# Patient Record
Sex: FEMALE | Race: WHITE | NOT HISPANIC OR LATINO | Employment: OTHER | URBAN - METROPOLITAN AREA
[De-identification: names, ages, dates, MRNs, and addresses within clinical notes are randomized per-mention and may not be internally consistent; named-entity substitution may affect disease eponyms.]

---

## 2017-02-24 ENCOUNTER — ALLSCRIPTS OFFICE VISIT (OUTPATIENT)
Dept: OTHER | Facility: OTHER | Age: 77
End: 2017-02-24

## 2017-03-01 ENCOUNTER — TRANSCRIBE ORDERS (OUTPATIENT)
Dept: ADMINISTRATIVE | Facility: HOSPITAL | Age: 77
End: 2017-03-01

## 2017-03-01 DIAGNOSIS — N28.1 KIDNEY CYST, ACQUIRED: ICD-10-CM

## 2017-03-01 DIAGNOSIS — K76.89 LIVER CYST: Primary | ICD-10-CM

## 2017-03-09 ENCOUNTER — HOSPITAL ENCOUNTER (OUTPATIENT)
Dept: RADIOLOGY | Facility: HOSPITAL | Age: 77
Discharge: HOME | End: 2017-03-09
Attending: INTERNAL MEDICINE
Payer: MEDICARE

## 2017-03-09 DIAGNOSIS — K76.89 LIVER CYST: ICD-10-CM

## 2017-03-09 DIAGNOSIS — N28.1 KIDNEY CYST, ACQUIRED: ICD-10-CM

## 2017-03-09 PROCEDURE — 76700 US EXAM ABDOM COMPLETE: CPT

## 2017-08-08 ENCOUNTER — ANESTHESIA EVENT (OUTPATIENT)
Dept: GASTROENTEROLOGY | Facility: AMBULARY SURGERY CENTER | Age: 77
End: 2017-08-08
Payer: MEDICARE

## 2017-08-09 ENCOUNTER — ANESTHESIA (OUTPATIENT)
Dept: GASTROENTEROLOGY | Facility: AMBULARY SURGERY CENTER | Age: 77
End: 2017-08-09
Payer: MEDICARE

## 2017-08-09 ENCOUNTER — HOSPITAL ENCOUNTER (OUTPATIENT)
Facility: AMBULARY SURGERY CENTER | Age: 77
Setting detail: OUTPATIENT SURGERY
Discharge: HOME | End: 2017-08-09
Attending: INTERNAL MEDICINE | Admitting: INTERNAL MEDICINE
Payer: MEDICARE

## 2017-08-09 VITALS
WEIGHT: 116 LBS | HEART RATE: 56 BPM | HEIGHT: 61 IN | TEMPERATURE: 97.2 F | OXYGEN SATURATION: 100 % | RESPIRATION RATE: 16 BRPM | BODY MASS INDEX: 21.9 KG/M2 | SYSTOLIC BLOOD PRESSURE: 144 MMHG | DIASTOLIC BLOOD PRESSURE: 67 MMHG

## 2017-08-09 DIAGNOSIS — K29.30 CHRONIC SUPERFICIAL GASTRITIS WITHOUT BLEEDING: ICD-10-CM

## 2017-08-09 DIAGNOSIS — K21.00 GASTRO-ESOPHAGEAL REFLUX DISEASE WITH ESOPHAGITIS: ICD-10-CM

## 2017-08-09 PROCEDURE — 88305 TISSUE EXAM BY PATHOLOGIST: CPT | Performed by: INTERNAL MEDICINE

## 2017-08-09 PROCEDURE — 88342 IMHCHEM/IMCYTCHM 1ST ANTB: CPT | Performed by: INTERNAL MEDICINE

## 2017-08-09 PROCEDURE — 88313 SPECIAL STAINS GROUP 2: CPT | Performed by: INTERNAL MEDICINE

## 2017-08-09 RX ORDER — PROPOFOL 10 MG/ML
INJECTION, EMULSION INTRAVENOUS CONTINUOUS PRN
Status: DISCONTINUED | OUTPATIENT
Start: 2017-08-09 | End: 2017-08-09 | Stop reason: SURG

## 2017-08-09 RX ORDER — SODIUM CHLORIDE, SODIUM LACTATE, POTASSIUM CHLORIDE, CALCIUM CHLORIDE 600; 310; 30; 20 MG/100ML; MG/100ML; MG/100ML; MG/100ML
125 INJECTION, SOLUTION INTRAVENOUS CONTINUOUS
Status: DISCONTINUED | OUTPATIENT
Start: 2017-08-09 | End: 2017-08-09 | Stop reason: HOSPADM

## 2017-08-09 RX ORDER — PROPOFOL 10 MG/ML
INJECTION, EMULSION INTRAVENOUS AS NEEDED
Status: DISCONTINUED | OUTPATIENT
Start: 2017-08-09 | End: 2017-08-09 | Stop reason: SURG

## 2017-08-09 RX ADMIN — SODIUM CHLORIDE, SODIUM LACTATE, POTASSIUM CHLORIDE, AND CALCIUM CHLORIDE 125 ML/HR: .6; .31; .03; .02 INJECTION, SOLUTION INTRAVENOUS at 08:04

## 2017-08-09 RX ADMIN — PROPOFOL 60 MG: 10 INJECTION, EMULSION INTRAVENOUS at 08:42

## 2017-08-09 RX ADMIN — SODIUM CHLORIDE, SODIUM LACTATE, POTASSIUM CHLORIDE, AND CALCIUM CHLORIDE: .6; .31; .03; .02 INJECTION, SOLUTION INTRAVENOUS at 08:34

## 2017-08-09 RX ADMIN — PROPOFOL 120 MCG/KG/MIN: 10 INJECTION, EMULSION INTRAVENOUS at 08:42

## 2017-08-09 RX ADMIN — LIDOCAINE HYDROCHLORIDE 80 MG: 20 INJECTION, SOLUTION INTRAVENOUS at 08:42

## 2017-08-30 ENCOUNTER — ALLSCRIPTS OFFICE VISIT (OUTPATIENT)
Dept: OTHER | Facility: OTHER | Age: 77
End: 2017-08-30

## 2017-11-13 ENCOUNTER — ALLSCRIPTS OFFICE VISIT (OUTPATIENT)
Dept: OTHER | Facility: OTHER | Age: 77
End: 2017-11-13

## 2017-11-14 NOTE — CONSULTS
Assessment  1  Difficulty walking (719 7) (R26 2)   2  Leg pain (729 5) (M79 606)   3  Callus (700) (L84)   4  Bony exostosis (726 91) (M89 8X9)   5  Hammer toe of left foot (735 4) (M20 42)    Plan     · Follow-up PRN Evaluation and Treatment  Follow-up  Status: Complete  Done:13Nov2017 09:27AM   · Schedule Surgery Treatment  Procedure  Status: Hold For - Scheduling  Requested for:13Nov2017   · Cut your nails straight across ; Status:Complete;   Done: 60YZM8863 09:27AM   · Help with corns and calluses ; Status:Complete;   Done: 91XOB6954 09:27AM   · There are many things you can do at home to ensure good foot health ; Status:Complete;  Done: 93XWY2415 09:27AM   · Wear shoes that give your toes plenty of room ; Status:Complete;   Done: 90YFW883613:10YS    Discussion/Summary  The patient was counseled regarding instructions for management,-- patient and family education,-- importance of compliance with treatment  Patient is able to Self-Care  The treatment plan was reviewed with the patient/guardian  The patient/guardian understands and agrees with the treatment plan      Chief Complaint  Left foot pain corn      History of Present Illness  HPI: Patient has continued pain in her small toes of the left foot  Patient has painful corns  She presents for evaluation      Review of Systems   Constitutional: No fever, no chills, feels well, no tiredness, no recent weight gain or loss  ENT: no ear ache, no loss of hearing, no nosebleeds or nasal discharge, no sore throat or hoarseness  Cardiovascular: no complaints of slow or fast heart rate, no chest pain, no palpitations, no leg claudication or lower extremity edema  Gastrointestinal: no complaints of abdominal pain, no constipation, no nausea or diarrhea, no vomiting, no bloody stools  Genitourinary: pelvic pain, but-- no dysmenorrhea  Musculoskeletal: no complaints of arthralgia, no myalgia, no joint swelling or stiffness, no limb pain or swelling        Active Problems  1  Cardiac murmur (785 2) (R01 1)   2  Difficulty walking (719 7) (R26 2)   3  Dyslipidemia (272 4) (E78 5)   4  Encounter for screening colonoscopy (V76 51) (Z12 11)   5  Hallux valgus (735 0) (M20 10)   6  Incomplete left bundle branch block (LBBB) (426 2) (I44 7)   7  Leg pain (729 5) (M79 606)   8  Open wound of foot, excluding toe(s) (892 0) (S91 309A)   9  Postmenopausal Atrophic Vaginitis (627 3)   10  Verruca vulgaris (078 10) (B07 9)    Family History  Mother    · Family history of Hypertension (V17 49)   · Family history of Transient Ischemic Attack  Father    · Family history of Hypertension (V17 49)   · Family history of Transient Ischemic Attack  Sister    · Family history of Asthma (V17 5)   · Family history of Hypertension (V17 49)    Social History     · Denied: History of Alcohol   · Denied: History of Drug Use   · Never A Smoker    Current Meds   1  CoQ10 CAPS; Therapy: (Recorded:03Bqb0714) to Recorded    Allergies  1  Penicillins   2  Sulfa Drugs  3  Latex    Vitals   Recorded: [id number] 09:17AM   Heart Rate 78   Respiration 17   Systolic 529   Diastolic 88   Height 5 ft 0 5 in   Weight 119 lb    BMI Calculated 22 86   BSA Calculated 1 51       Physical Exam  Left Foot: Appearance: Normal except as noted: excessive pronation-- and-- pes planus  Great toe deformities include a bunion  Tenderness: None except the distal second metatarsal    Right Foot: Appearance: Normal except as noted: excessive pronation-- and-- pes planus  Great toe deformities include a bunion  Left Ankle: ROM: limited ROM in all planes   Right Ankle: ROM: limited ROM in all planes   Neurological Exam: performed  Light touch was intact bilaterally  Vibratory sensation was intact bilaterally  Response to monofilament test was intact bilaterally  Deep tendon reflexes: patellar reflex present bilaterally-- and-- achilles reflex present bilaterally     Vascular Exam: performed Dorsalis pedis pulses were present bilaterally  Posterior tibial pulses were present bilaterally  Elevation Pallor: absent bilaterally  Dependence rubor was absent bilaterally  Edema: none  Toenails: All of the toenails were elongated  Hyperkeratosis: present on both second sub metatarsals  Shoe Gear Evaluation: performed ()  Recommendation(s): SAS style  Feet      Procedure  Lesions debrided  Phenol applied   Patient return when necessary      Signatures   Electronically signed by : Cheryl Dennison DPM; Nov 13 2017  9:29AM EST                       (Author)

## 2018-01-13 VITALS
SYSTOLIC BLOOD PRESSURE: 134 MMHG | DIASTOLIC BLOOD PRESSURE: 88 MMHG | HEIGHT: 61 IN | BODY MASS INDEX: 22.47 KG/M2 | HEART RATE: 78 BPM | RESPIRATION RATE: 17 BRPM | WEIGHT: 119 LBS

## 2018-01-14 VITALS
SYSTOLIC BLOOD PRESSURE: 140 MMHG | WEIGHT: 119 LBS | HEIGHT: 61 IN | DIASTOLIC BLOOD PRESSURE: 82 MMHG | HEART RATE: 71 BPM | BODY MASS INDEX: 22.47 KG/M2 | OXYGEN SATURATION: 98 %

## 2018-01-14 VITALS
BODY MASS INDEX: 24.08 KG/M2 | WEIGHT: 127.56 LBS | HEART RATE: 75 BPM | SYSTOLIC BLOOD PRESSURE: 124 MMHG | OXYGEN SATURATION: 98 % | HEIGHT: 61 IN | DIASTOLIC BLOOD PRESSURE: 76 MMHG

## 2019-06-12 ENCOUNTER — APPOINTMENT (OUTPATIENT)
Dept: LAB | Facility: HOSPITAL | Age: 79
End: 2019-06-12
Payer: MEDICARE

## 2019-06-12 ENCOUNTER — TRANSCRIBE ORDERS (OUTPATIENT)
Dept: ADMINISTRATIVE | Facility: HOSPITAL | Age: 79
End: 2019-06-12

## 2019-06-12 DIAGNOSIS — Z79.01 LONG TERM (CURRENT) USE OF ANTICOAGULANTS: ICD-10-CM

## 2019-06-12 DIAGNOSIS — D89.89 RADIATION CHIMERA (HCC): Primary | ICD-10-CM

## 2019-06-12 DIAGNOSIS — D89.89 RADIATION CHIMERA (HCC): ICD-10-CM

## 2019-06-12 DIAGNOSIS — D68.9 BLOOD CLOTTING DISORDER (HCC): ICD-10-CM

## 2019-06-12 LAB
APTT PPP: 30 SECONDS (ref 26–38)
BASOPHILS # BLD AUTO: 0.04 THOUSANDS/ΜL (ref 0–0.1)
BASOPHILS NFR BLD AUTO: 1 % (ref 0–1)
DEPRECATED D DIMER PPP: 230 NG/ML (FEU) (ref 190–520)
EOSINOPHIL # BLD AUTO: 0.04 THOUSAND/ΜL (ref 0–0.61)
EOSINOPHIL NFR BLD AUTO: 1 % (ref 0–6)
ERYTHROCYTE [DISTWIDTH] IN BLOOD BY AUTOMATED COUNT: 12.9 % (ref 11.6–15.1)
FIBRINOGEN PPP-MCNC: 438 MG/DL (ref 227–495)
HCT VFR BLD AUTO: 45.1 % (ref 34.8–46.1)
HGB BLD-MCNC: 14.6 G/DL (ref 11.5–15.4)
IMM GRANULOCYTES # BLD AUTO: 0.01 THOUSAND/UL (ref 0–0.2)
IMM GRANULOCYTES NFR BLD AUTO: 0 % (ref 0–2)
INR PPP: 0.98 (ref 0.86–1.16)
LYMPHOCYTES # BLD AUTO: 2.01 THOUSANDS/ΜL (ref 0.6–4.47)
LYMPHOCYTES NFR BLD AUTO: 30 % (ref 14–44)
MCH RBC QN AUTO: 29.9 PG (ref 26.8–34.3)
MCHC RBC AUTO-ENTMCNC: 32.4 G/DL (ref 31.4–37.4)
MCV RBC AUTO: 92 FL (ref 82–98)
MONOCYTES # BLD AUTO: 0.29 THOUSAND/ΜL (ref 0.17–1.22)
MONOCYTES NFR BLD AUTO: 4 % (ref 4–12)
NEUTROPHILS # BLD AUTO: 4.38 THOUSANDS/ΜL (ref 1.85–7.62)
NEUTS SEG NFR BLD AUTO: 64 % (ref 43–75)
NRBC BLD AUTO-RTO: 0 /100 WBCS
PLATELET # BLD AUTO: 254 THOUSANDS/UL (ref 149–390)
PMV BLD AUTO: 11 FL (ref 8.9–12.7)
PROTHROMBIN TIME: 10.3 SECONDS (ref 9.4–11.7)
RBC # BLD AUTO: 4.88 MILLION/UL (ref 3.81–5.12)
WBC # BLD AUTO: 6.77 THOUSAND/UL (ref 4.31–10.16)

## 2019-06-12 PROCEDURE — 85730 THROMBOPLASTIN TIME PARTIAL: CPT

## 2019-06-12 PROCEDURE — 85379 FIBRIN DEGRADATION QUANT: CPT

## 2019-06-12 PROCEDURE — 85025 COMPLETE CBC W/AUTO DIFF WBC: CPT

## 2019-06-12 PROCEDURE — 85610 PROTHROMBIN TIME: CPT

## 2019-06-12 PROCEDURE — 36415 COLL VENOUS BLD VENIPUNCTURE: CPT

## 2019-06-12 PROCEDURE — 85384 FIBRINOGEN ACTIVITY: CPT

## 2019-10-28 ENCOUNTER — APPOINTMENT (EMERGENCY)
Dept: RADIOLOGY | Facility: HOSPITAL | Age: 79
End: 2019-10-28
Attending: EMERGENCY MEDICINE
Payer: MEDICARE

## 2019-10-28 ENCOUNTER — HOSPITAL ENCOUNTER (EMERGENCY)
Facility: HOSPITAL | Age: 79
Discharge: HOME | End: 2019-10-28
Attending: EMERGENCY MEDICINE
Payer: MEDICARE

## 2019-10-28 VITALS
SYSTOLIC BLOOD PRESSURE: 151 MMHG | OXYGEN SATURATION: 96 % | DIASTOLIC BLOOD PRESSURE: 68 MMHG | RESPIRATION RATE: 10 BRPM | TEMPERATURE: 97.4 F | WEIGHT: 114 LBS | HEIGHT: 61 IN | HEART RATE: 72 BPM | BODY MASS INDEX: 21.52 KG/M2

## 2019-10-28 DIAGNOSIS — R00.2 PALPITATIONS: Primary | ICD-10-CM

## 2019-10-28 DIAGNOSIS — E87.6 HYPOKALEMIA: ICD-10-CM

## 2019-10-28 LAB
ALBUMIN SERPL BCP-MCNC: 4.2 G/DL (ref 3.5–5)
ALP SERPL-CCNC: 89 U/L (ref 46–116)
ALT SERPL W P-5'-P-CCNC: 23 U/L (ref 12–78)
ANION GAP SERPL CALCULATED.3IONS-SCNC: 7 MMOL/L (ref 4–13)
APTT PPP: 29 SECONDS (ref 25–32)
AST SERPL W P-5'-P-CCNC: 19 U/L (ref 5–45)
ATRIAL RATE: 83 BPM
BASOPHILS # BLD AUTO: 0.07 THOUSANDS/ΜL (ref 0–0.1)
BASOPHILS NFR BLD AUTO: 1 % (ref 0–1)
BILIRUB SERPL-MCNC: 0.4 MG/DL (ref 0.2–1)
BUN SERPL-MCNC: 18 MG/DL (ref 5–25)
CALCIUM SERPL-MCNC: 10 MG/DL (ref 8.3–10.1)
CHLORIDE SERPL-SCNC: 101 MMOL/L (ref 100–108)
CO2 SERPL-SCNC: 30 MMOL/L (ref 21–32)
CREAT SERPL-MCNC: 0.66 MG/DL (ref 0.6–1.3)
EOSINOPHIL # BLD AUTO: 0.12 THOUSAND/ΜL (ref 0–0.61)
EOSINOPHIL NFR BLD AUTO: 2 % (ref 0–6)
ERYTHROCYTE [DISTWIDTH] IN BLOOD BY AUTOMATED COUNT: 13.2 % (ref 11.6–15.1)
GFR SERPL CREATININE-BSD FRML MDRD: 85 ML/MIN/1.73SQ M
GLUCOSE SERPL-MCNC: 97 MG/DL (ref 65–140)
HCT VFR BLD AUTO: 44.4 % (ref 34.8–46.1)
HGB BLD-MCNC: 14 G/DL (ref 11.5–15.4)
IMM GRANULOCYTES # BLD AUTO: 0.01 THOUSAND/UL (ref 0–0.2)
IMM GRANULOCYTES NFR BLD AUTO: 0 % (ref 0–2)
INR PPP: 0.93 (ref 0.91–1.09)
LYMPHOCYTES # BLD AUTO: 3.52 THOUSANDS/ΜL (ref 0.6–4.47)
LYMPHOCYTES NFR BLD AUTO: 46 % (ref 14–44)
MAGNESIUM SERPL-MCNC: 1.9 MG/DL (ref 1.6–2.6)
MCH RBC QN AUTO: 29.2 PG (ref 26.8–34.3)
MCHC RBC AUTO-ENTMCNC: 31.5 G/DL (ref 31.4–37.4)
MCV RBC AUTO: 93 FL (ref 82–98)
MONOCYTES # BLD AUTO: 0.57 THOUSAND/ΜL (ref 0.17–1.22)
MONOCYTES NFR BLD AUTO: 8 % (ref 4–12)
NEUTROPHILS # BLD AUTO: 3.16 THOUSANDS/ΜL (ref 1.85–7.62)
NEUTS SEG NFR BLD AUTO: 43 % (ref 43–75)
NRBC BLD AUTO-RTO: 0 /100 WBCS
P AXIS: 67 DEGREES
PLATELET # BLD AUTO: 250 THOUSANDS/UL (ref 149–390)
PMV BLD AUTO: 10.5 FL (ref 8.9–12.7)
POTASSIUM SERPL-SCNC: 3.2 MMOL/L (ref 3.5–5.3)
PR INTERVAL: 192 MS
PROT SERPL-MCNC: 7.3 G/DL (ref 6.4–8.2)
PROTHROMBIN TIME: 10 SECONDS (ref 9.8–12)
QRS AXIS: 64 DEGREES
QRSD INTERVAL: 72 MS
QT INTERVAL: 372 MS
QTC INTERVAL: 437 MS
RBC # BLD AUTO: 4.8 MILLION/UL (ref 3.81–5.12)
SODIUM SERPL-SCNC: 138 MMOL/L (ref 136–145)
T WAVE AXIS: 81 DEGREES
TROPONIN I SERPL-MCNC: <0.02 NG/ML
VENTRICULAR RATE: 83 BPM
WBC # BLD AUTO: 7.45 THOUSAND/UL (ref 4.31–10.16)

## 2019-10-28 PROCEDURE — 71045 X-RAY EXAM CHEST 1 VIEW: CPT

## 2019-10-28 PROCEDURE — 80053 COMPREHEN METABOLIC PANEL: CPT | Performed by: EMERGENCY MEDICINE

## 2019-10-28 PROCEDURE — 99285 EMERGENCY DEPT VISIT HI MDM: CPT

## 2019-10-28 PROCEDURE — 84484 ASSAY OF TROPONIN QUANT: CPT | Performed by: EMERGENCY MEDICINE

## 2019-10-28 PROCEDURE — 93005 ELECTROCARDIOGRAM TRACING: CPT

## 2019-10-28 PROCEDURE — 83735 ASSAY OF MAGNESIUM: CPT | Performed by: EMERGENCY MEDICINE

## 2019-10-28 PROCEDURE — 85730 THROMBOPLASTIN TIME PARTIAL: CPT | Performed by: EMERGENCY MEDICINE

## 2019-10-28 PROCEDURE — 36415 COLL VENOUS BLD VENIPUNCTURE: CPT | Performed by: EMERGENCY MEDICINE

## 2019-10-28 PROCEDURE — 85025 COMPLETE CBC W/AUTO DIFF WBC: CPT | Performed by: EMERGENCY MEDICINE

## 2019-10-28 PROCEDURE — 93010 ELECTROCARDIOGRAM REPORT: CPT | Performed by: INTERNAL MEDICINE

## 2019-10-28 PROCEDURE — 85610 PROTHROMBIN TIME: CPT | Performed by: EMERGENCY MEDICINE

## 2019-10-28 RX ORDER — POTASSIUM CHLORIDE 20 MEQ/1
40 TABLET, EXTENDED RELEASE ORAL ONCE
Status: COMPLETED | OUTPATIENT
Start: 2019-10-28 | End: 2019-10-28

## 2019-10-28 RX ORDER — IBUPROFEN 200 MG
TABLET ORAL 4 TIMES DAILY
COMMUNITY
End: 2020-10-21

## 2019-10-28 RX ORDER — DOXYCYCLINE HYCLATE 100 MG
100 TABLET ORAL 2 TIMES DAILY
COMMUNITY
End: 2020-10-21

## 2019-10-28 RX ADMIN — POTASSIUM CHLORIDE 40 MEQ: 1500 TABLET, EXTENDED RELEASE ORAL at 03:58

## 2019-10-28 NOTE — ED NOTES
MD added on magnesium level to labs, pt will be cleared for discharge pending mag level  Pt updated on status no /co CP or sob, SR on monitor   VSS     Marcela Pisano RN  10/28/19 9991

## 2019-10-28 NOTE — ED NOTES
Pt being evaluated by MD now reporting had a "pounding felling" states took own pulse with rate of 65  Pt educated on normal range of heart rate  Pt on CM with SR noted rate 80  Pt does not presently feel heart racing  Pt denies any recent travel no ill contacts has been feeling well normal appetite/elimination  Pt admits to feeling anxious during event as she is  and lives alone  Pt currently taking doxycycline and ibuprofen since June for lymes disease  Speaking in complete sentences resp easy and unlabored skin warm pink and dry  Trace pedal edema noted b/l pulses present   No c/o CP/SOB       Darci Gregory, RN  10/28/19 0157

## 2019-10-28 NOTE — ED NOTES
Pt to ed karolina will call neighbor for ride, instructed to return at any time for worsening or worrisome symptoms      Elie Sun RN  10/28/19 8280

## 2019-11-11 ENCOUNTER — TRANSCRIBE ORDERS (OUTPATIENT)
Dept: ADMINISTRATIVE | Facility: HOSPITAL | Age: 79
End: 2019-11-11

## 2019-11-11 ENCOUNTER — APPOINTMENT (OUTPATIENT)
Dept: LAB | Facility: HOSPITAL | Age: 79
End: 2019-11-11
Payer: MEDICARE

## 2019-11-11 DIAGNOSIS — A69.20 LYME DISEASE: ICD-10-CM

## 2019-11-11 DIAGNOSIS — A69.20 LYME DISEASE: Primary | ICD-10-CM

## 2019-11-11 DIAGNOSIS — R73.03 DIABETES MELLITUS, LATENT: ICD-10-CM

## 2019-11-11 LAB
ALBUMIN SERPL BCP-MCNC: 3.9 G/DL (ref 3.5–5)
ALP SERPL-CCNC: 70 U/L (ref 46–116)
ALT SERPL W P-5'-P-CCNC: 25 U/L (ref 12–78)
ANION GAP SERPL CALCULATED.3IONS-SCNC: 4 MMOL/L (ref 4–13)
AST SERPL W P-5'-P-CCNC: 17 U/L (ref 5–45)
BASOPHILS # BLD AUTO: 0.05 THOUSANDS/ΜL (ref 0–0.1)
BASOPHILS NFR BLD AUTO: 1 % (ref 0–1)
BILIRUB DIRECT SERPL-MCNC: 0.15 MG/DL (ref 0–0.2)
BILIRUB SERPL-MCNC: 0.63 MG/DL (ref 0.2–1)
BUN SERPL-MCNC: 16 MG/DL (ref 5–25)
CALCIUM SERPL-MCNC: 9.2 MG/DL (ref 8.3–10.1)
CHLORIDE SERPL-SCNC: 105 MMOL/L (ref 100–108)
CO2 SERPL-SCNC: 30 MMOL/L (ref 21–32)
CREAT SERPL-MCNC: 0.7 MG/DL (ref 0.6–1.3)
CRP SERPL QL: <3 MG/L
EOSINOPHIL # BLD AUTO: 0.08 THOUSAND/ΜL (ref 0–0.61)
EOSINOPHIL NFR BLD AUTO: 1 % (ref 0–6)
ERYTHROCYTE [DISTWIDTH] IN BLOOD BY AUTOMATED COUNT: 13.3 % (ref 11.6–15.1)
GFR SERPL CREATININE-BSD FRML MDRD: 84 ML/MIN/1.73SQ M
GLUCOSE P FAST SERPL-MCNC: 85 MG/DL (ref 65–99)
HCT VFR BLD AUTO: 43.1 % (ref 34.8–46.1)
HGB BLD-MCNC: 13.6 G/DL (ref 11.5–15.4)
IMM GRANULOCYTES # BLD AUTO: 0.01 THOUSAND/UL (ref 0–0.2)
IMM GRANULOCYTES NFR BLD AUTO: 0 % (ref 0–2)
LYMPHOCYTES # BLD AUTO: 2.3 THOUSANDS/ΜL (ref 0.6–4.47)
LYMPHOCYTES NFR BLD AUTO: 40 % (ref 14–44)
MCH RBC QN AUTO: 29.3 PG (ref 26.8–34.3)
MCHC RBC AUTO-ENTMCNC: 31.6 G/DL (ref 31.4–37.4)
MCV RBC AUTO: 93 FL (ref 82–98)
MONOCYTES # BLD AUTO: 0.42 THOUSAND/ΜL (ref 0.17–1.22)
MONOCYTES NFR BLD AUTO: 7 % (ref 4–12)
NEUTROPHILS # BLD AUTO: 2.83 THOUSANDS/ΜL (ref 1.85–7.62)
NEUTS SEG NFR BLD AUTO: 51 % (ref 43–75)
NRBC BLD AUTO-RTO: 0 /100 WBCS
PLATELET # BLD AUTO: 249 THOUSANDS/UL (ref 149–390)
PMV BLD AUTO: 11.3 FL (ref 8.9–12.7)
POTASSIUM SERPL-SCNC: 3.7 MMOL/L (ref 3.5–5.3)
PROT SERPL-MCNC: 6.9 G/DL (ref 6.4–8.2)
RBC # BLD AUTO: 4.64 MILLION/UL (ref 3.81–5.12)
SODIUM SERPL-SCNC: 139 MMOL/L (ref 136–145)
WBC # BLD AUTO: 5.69 THOUSAND/UL (ref 4.31–10.16)

## 2019-11-11 PROCEDURE — 86618 LYME DISEASE ANTIBODY: CPT

## 2019-11-11 PROCEDURE — 80048 BASIC METABOLIC PNL TOTAL CA: CPT

## 2019-11-11 PROCEDURE — 36415 COLL VENOUS BLD VENIPUNCTURE: CPT | Performed by: FAMILY MEDICINE

## 2019-11-11 PROCEDURE — 85025 COMPLETE CBC W/AUTO DIFF WBC: CPT | Performed by: FAMILY MEDICINE

## 2019-11-11 PROCEDURE — 86140 C-REACTIVE PROTEIN: CPT

## 2019-11-11 PROCEDURE — 86617 LYME DISEASE ANTIBODY: CPT

## 2019-11-11 PROCEDURE — 80076 HEPATIC FUNCTION PANEL: CPT

## 2019-11-13 LAB
B BURGDOR IGG PATRN SER IB-IMP: POSITIVE
B BURGDOR IGG+IGM SER-ACNC: 1.31 ISR (ref 0–0.9)
B BURGDOR IGM PATRN SER IB-IMP: POSITIVE
B BURGDOR18KD IGG SER QL IB: PRESENT
B BURGDOR23KD IGG SER QL IB: PRESENT
B BURGDOR23KD IGM SER QL IB: PRESENT
B BURGDOR28KD IGG SER QL IB: ABNORMAL
B BURGDOR30KD IGG SER QL IB: ABNORMAL
B BURGDOR39KD IGG SER QL IB: PRESENT
B BURGDOR39KD IGM SER QL IB: ABNORMAL
B BURGDOR41KD IGG SER QL IB: PRESENT
B BURGDOR41KD IGM SER QL IB: PRESENT
B BURGDOR45KD IGG SER QL IB: ABNORMAL
B BURGDOR58KD IGG SER QL IB: PRESENT
B BURGDOR66KD IGG SER QL IB: ABNORMAL
B BURGDOR93KD IGG SER QL IB: PRESENT

## 2020-01-03 ENCOUNTER — APPOINTMENT (OUTPATIENT)
Dept: LAB | Facility: HOSPITAL | Age: 80
End: 2020-01-03
Payer: MEDICARE

## 2020-01-03 ENCOUNTER — TRANSCRIBE ORDERS (OUTPATIENT)
Dept: ADMINISTRATIVE | Facility: HOSPITAL | Age: 80
End: 2020-01-03

## 2020-01-03 DIAGNOSIS — E72.11 METHYLENE THF REDUCTASE DEFICIENCY AND HOMOCYSTINURIA (HCC): ICD-10-CM

## 2020-01-03 DIAGNOSIS — T73.3XXA FATIGUE DUE TO EXCESSIVE EXERTION, INITIAL ENCOUNTER: ICD-10-CM

## 2020-01-03 DIAGNOSIS — R73.03 DIABETES MELLITUS, LATENT: ICD-10-CM

## 2020-01-03 DIAGNOSIS — E78.5 HYPERLIPIDEMIA, UNSPECIFIED HYPERLIPIDEMIA TYPE: ICD-10-CM

## 2020-01-03 DIAGNOSIS — E55.9 VITAMIN D DEFICIENCY: ICD-10-CM

## 2020-01-03 DIAGNOSIS — D89.89 RADIATION CHIMERA (HCC): ICD-10-CM

## 2020-01-03 DIAGNOSIS — A69.20 LYME DISEASE: ICD-10-CM

## 2020-01-03 DIAGNOSIS — E72.12 METHYLENE THF REDUCTASE DEFICIENCY AND HOMOCYSTINURIA (HCC): ICD-10-CM

## 2020-01-03 DIAGNOSIS — M81.0 SENILE OSTEOPOROSIS: ICD-10-CM

## 2020-01-03 DIAGNOSIS — A69.20 LYME DISEASE: Primary | ICD-10-CM

## 2020-01-03 DIAGNOSIS — E66.8 OBESITY OF ENDOCRINE ORIGIN: ICD-10-CM

## 2020-01-03 LAB
25(OH)D3 SERPL-MCNC: 45 NG/ML (ref 30–100)
ALBUMIN SERPL BCP-MCNC: 3.8 G/DL (ref 3.5–5)
ALP SERPL-CCNC: 95 U/L (ref 46–116)
ALT SERPL W P-5'-P-CCNC: 24 U/L (ref 12–78)
ANION GAP SERPL CALCULATED.3IONS-SCNC: 3 MMOL/L (ref 4–13)
AST SERPL W P-5'-P-CCNC: 14 U/L (ref 5–45)
BASOPHILS # BLD AUTO: 0.04 THOUSANDS/ΜL (ref 0–0.1)
BASOPHILS NFR BLD AUTO: 1 % (ref 0–1)
BILIRUB DIRECT SERPL-MCNC: 0.09 MG/DL (ref 0–0.2)
BILIRUB SERPL-MCNC: 0.4 MG/DL (ref 0.2–1)
BUN SERPL-MCNC: 16 MG/DL (ref 5–25)
CALCIUM SERPL-MCNC: 9.2 MG/DL (ref 8.3–10.1)
CHLORIDE SERPL-SCNC: 102 MMOL/L (ref 100–108)
CHOLEST SERPL-MCNC: 206 MG/DL (ref 50–200)
CO2 SERPL-SCNC: 32 MMOL/L (ref 21–32)
CORTIS SERPL-MCNC: 27.8 UG/DL
CREAT SERPL-MCNC: 0.77 MG/DL (ref 0.6–1.3)
CRP SERPL QL: 1.4 MG/L
EOSINOPHIL # BLD AUTO: 0.1 THOUSAND/ΜL (ref 0–0.61)
EOSINOPHIL NFR BLD AUTO: 1 % (ref 0–6)
ERYTHROCYTE [DISTWIDTH] IN BLOOD BY AUTOMATED COUNT: 12.8 % (ref 11.6–15.1)
EST. AVERAGE GLUCOSE BLD GHB EST-MCNC: 117 MG/DL
GFR SERPL CREATININE-BSD FRML MDRD: 75 ML/MIN/1.73SQ M
GLUCOSE P FAST SERPL-MCNC: 90 MG/DL (ref 65–99)
HBA1C MFR BLD: 5.7 % (ref 4.2–6.3)
HCT VFR BLD AUTO: 44.4 % (ref 34.8–46.1)
HCYS SERPL-SCNC: 7.7 UMOL/L (ref 3.7–11.2)
HDLC SERPL-MCNC: 68 MG/DL
HGB BLD-MCNC: 14.5 G/DL (ref 11.5–15.4)
IMM GRANULOCYTES # BLD AUTO: 0.03 THOUSAND/UL (ref 0–0.2)
IMM GRANULOCYTES NFR BLD AUTO: 0 % (ref 0–2)
LDLC SERPL CALC-MCNC: 121 MG/DL (ref 0–100)
LYMPHOCYTES # BLD AUTO: 2.43 THOUSANDS/ΜL (ref 0.6–4.47)
LYMPHOCYTES NFR BLD AUTO: 35 % (ref 14–44)
MCH RBC QN AUTO: 30.3 PG (ref 26.8–34.3)
MCHC RBC AUTO-ENTMCNC: 32.7 G/DL (ref 31.4–37.4)
MCV RBC AUTO: 93 FL (ref 82–98)
MONOCYTES # BLD AUTO: 0.55 THOUSAND/ΜL (ref 0.17–1.22)
MONOCYTES NFR BLD AUTO: 8 % (ref 4–12)
NEUTROPHILS # BLD AUTO: 3.8 THOUSANDS/ΜL (ref 1.85–7.62)
NEUTS SEG NFR BLD AUTO: 55 % (ref 43–75)
NONHDLC SERPL-MCNC: 138 MG/DL
NRBC BLD AUTO-RTO: 0 /100 WBCS
PLATELET # BLD AUTO: 303 THOUSANDS/UL (ref 149–390)
PMV BLD AUTO: 10.5 FL (ref 8.9–12.7)
POTASSIUM SERPL-SCNC: 3.9 MMOL/L (ref 3.5–5.3)
PROT SERPL-MCNC: 7.1 G/DL (ref 6.4–8.2)
RBC # BLD AUTO: 4.79 MILLION/UL (ref 3.81–5.12)
SODIUM SERPL-SCNC: 137 MMOL/L (ref 136–145)
T3FREE SERPL-MCNC: 2.89 PG/ML (ref 2.3–4.2)
T4 FREE SERPL-MCNC: 0.8 NG/DL (ref 0.76–1.46)
TRIGL SERPL-MCNC: 84 MG/DL
TSH SERPL DL<=0.05 MIU/L-ACNC: 1.89 UIU/ML (ref 0.36–3.74)
WBC # BLD AUTO: 6.95 THOUSAND/UL (ref 4.31–10.16)

## 2020-01-03 PROCEDURE — 82306 VITAMIN D 25 HYDROXY: CPT

## 2020-01-03 PROCEDURE — 84439 ASSAY OF FREE THYROXINE: CPT

## 2020-01-03 PROCEDURE — 86618 LYME DISEASE ANTIBODY: CPT

## 2020-01-03 PROCEDURE — 80076 HEPATIC FUNCTION PANEL: CPT

## 2020-01-03 PROCEDURE — 84443 ASSAY THYROID STIM HORMONE: CPT

## 2020-01-03 PROCEDURE — 83036 HEMOGLOBIN GLYCOSYLATED A1C: CPT | Performed by: FAMILY MEDICINE

## 2020-01-03 PROCEDURE — 83090 ASSAY OF HOMOCYSTEINE: CPT

## 2020-01-03 PROCEDURE — 82533 TOTAL CORTISOL: CPT

## 2020-01-03 PROCEDURE — 36415 COLL VENOUS BLD VENIPUNCTURE: CPT | Performed by: FAMILY MEDICINE

## 2020-01-03 PROCEDURE — 80061 LIPID PANEL: CPT

## 2020-01-03 PROCEDURE — 84481 FREE ASSAY (FT-3): CPT

## 2020-01-03 PROCEDURE — 86617 LYME DISEASE ANTIBODY: CPT

## 2020-01-03 PROCEDURE — 80048 BASIC METABOLIC PNL TOTAL CA: CPT | Performed by: FAMILY MEDICINE

## 2020-01-03 PROCEDURE — 86038 ANTINUCLEAR ANTIBODIES: CPT

## 2020-01-03 PROCEDURE — 86140 C-REACTIVE PROTEIN: CPT

## 2020-01-03 PROCEDURE — 82626 DEHYDROEPIANDROSTERONE: CPT

## 2020-01-03 PROCEDURE — 85025 COMPLETE CBC W/AUTO DIFF WBC: CPT | Performed by: FAMILY MEDICINE

## 2020-01-06 LAB — RYE IGE QN: NEGATIVE

## 2020-01-07 LAB
B BURGDOR IGG PATRN SER IB-IMP: POSITIVE
B BURGDOR IGG+IGM SER-ACNC: 1.08 ISR (ref 0–0.9)
B BURGDOR IGM PATRN SER IB-IMP: POSITIVE
B BURGDOR18KD IGG SER QL IB: PRESENT
B BURGDOR23KD IGG SER QL IB: PRESENT
B BURGDOR23KD IGM SER QL IB: PRESENT
B BURGDOR28KD IGG SER QL IB: ABNORMAL
B BURGDOR30KD IGG SER QL IB: ABNORMAL
B BURGDOR39KD IGG SER QL IB: PRESENT
B BURGDOR39KD IGM SER QL IB: ABNORMAL
B BURGDOR41KD IGG SER QL IB: PRESENT
B BURGDOR41KD IGM SER QL IB: PRESENT
B BURGDOR45KD IGG SER QL IB: ABNORMAL
B BURGDOR58KD IGG SER QL IB: ABNORMAL
B BURGDOR66KD IGG SER QL IB: ABNORMAL
B BURGDOR93KD IGG SER QL IB: PRESENT
DHEA SERPL-MCNC: 368 NG/DL (ref 31–701)

## 2020-02-02 ENCOUNTER — APPOINTMENT (EMERGENCY)
Dept: RADIOLOGY | Facility: HOSPITAL | Age: 80
End: 2020-02-02
Payer: MEDICARE

## 2020-02-02 ENCOUNTER — HOSPITAL ENCOUNTER (EMERGENCY)
Facility: HOSPITAL | Age: 80
Discharge: HOME | End: 2020-02-02
Attending: EMERGENCY MEDICINE | Admitting: EMERGENCY MEDICINE
Payer: MEDICARE

## 2020-02-02 VITALS
TEMPERATURE: 98.3 F | BODY MASS INDEX: 24.73 KG/M2 | OXYGEN SATURATION: 99 % | SYSTOLIC BLOOD PRESSURE: 167 MMHG | HEART RATE: 66 BPM | DIASTOLIC BLOOD PRESSURE: 77 MMHG | RESPIRATION RATE: 18 BRPM | WEIGHT: 128.75 LBS

## 2020-02-02 DIAGNOSIS — M79.606 LEG PAIN: Primary | ICD-10-CM

## 2020-02-02 DIAGNOSIS — K21.9 ACID REFLUX: ICD-10-CM

## 2020-02-02 DIAGNOSIS — R07.9 CHEST PAIN: ICD-10-CM

## 2020-02-02 LAB
ALBUMIN SERPL BCP-MCNC: 4.3 G/DL (ref 3.5–5)
ALP SERPL-CCNC: 86 U/L (ref 46–116)
ALT SERPL W P-5'-P-CCNC: 32 U/L (ref 12–78)
ANION GAP SERPL CALCULATED.3IONS-SCNC: 9 MMOL/L (ref 4–13)
APTT PPP: 29 SECONDS (ref 23–37)
AST SERPL W P-5'-P-CCNC: 22 U/L (ref 5–45)
BACTERIA UR QL AUTO: ABNORMAL /HPF
BASOPHILS # BLD AUTO: 0.06 THOUSANDS/ΜL (ref 0–0.1)
BASOPHILS NFR BLD AUTO: 1 % (ref 0–1)
BILIRUB SERPL-MCNC: 0.3 MG/DL (ref 0.2–1)
BILIRUB UR QL STRIP: NEGATIVE
BUN SERPL-MCNC: 20 MG/DL (ref 5–25)
CALCIUM SERPL-MCNC: 9.3 MG/DL (ref 8.3–10.1)
CHLORIDE SERPL-SCNC: 103 MMOL/L (ref 100–108)
CK SERPL-CCNC: 52 U/L (ref 26–192)
CLARITY UR: CLEAR
CO2 SERPL-SCNC: 28 MMOL/L (ref 21–32)
COLOR UR: ABNORMAL
CREAT SERPL-MCNC: 0.97 MG/DL (ref 0.6–1.3)
EOSINOPHIL # BLD AUTO: 0.04 THOUSAND/ΜL (ref 0–0.61)
EOSINOPHIL NFR BLD AUTO: 0 % (ref 0–6)
ERYTHROCYTE [DISTWIDTH] IN BLOOD BY AUTOMATED COUNT: 12.7 % (ref 11.6–15.1)
GFR SERPL CREATININE-BSD FRML MDRD: 57 ML/MIN/1.73SQ M
GLUCOSE SERPL-MCNC: 98 MG/DL (ref 65–140)
GLUCOSE UR STRIP-MCNC: NEGATIVE MG/DL
HCT VFR BLD AUTO: 45.1 % (ref 34.8–46.1)
HGB BLD-MCNC: 14.4 G/DL (ref 11.5–15.4)
HGB UR QL STRIP.AUTO: ABNORMAL
IMM GRANULOCYTES # BLD AUTO: 0.05 THOUSAND/UL (ref 0–0.2)
IMM GRANULOCYTES NFR BLD AUTO: 0 % (ref 0–2)
INR PPP: 0.94 (ref 0.91–1.09)
KETONES UR STRIP-MCNC: ABNORMAL MG/DL
LEUKOCYTE ESTERASE UR QL STRIP: NEGATIVE
LIPASE SERPL-CCNC: 114 U/L (ref 73–393)
LYMPHOCYTES # BLD AUTO: 2.3 THOUSANDS/ΜL (ref 0.6–4.47)
LYMPHOCYTES NFR BLD AUTO: 21 % (ref 14–44)
MAGNESIUM SERPL-MCNC: 2.2 MG/DL (ref 1.6–2.6)
MCH RBC QN AUTO: 29.8 PG (ref 26.8–34.3)
MCHC RBC AUTO-ENTMCNC: 31.9 G/DL (ref 31.4–37.4)
MCV RBC AUTO: 93 FL (ref 82–98)
MONOCYTES # BLD AUTO: 0.53 THOUSAND/ΜL (ref 0.17–1.22)
MONOCYTES NFR BLD AUTO: 5 % (ref 4–12)
NEUTROPHILS # BLD AUTO: 8.22 THOUSANDS/ΜL (ref 1.85–7.62)
NEUTS SEG NFR BLD AUTO: 73 % (ref 43–75)
NITRITE UR QL STRIP: NEGATIVE
NON-SQ EPI CELLS URNS QL MICRO: ABNORMAL /HPF
NRBC BLD AUTO-RTO: 0 /100 WBCS
NT-PROBNP SERPL-MCNC: 109 PG/ML
PH UR STRIP.AUTO: 5.5 [PH]
PLATELET # BLD AUTO: 281 THOUSANDS/UL (ref 149–390)
PMV BLD AUTO: 10.8 FL (ref 8.9–12.7)
POTASSIUM SERPL-SCNC: 3.9 MMOL/L (ref 3.5–5.3)
PROT SERPL-MCNC: 7.6 G/DL (ref 6.4–8.2)
PROT UR STRIP-MCNC: NEGATIVE MG/DL
PROTHROMBIN TIME: 10.2 SECONDS (ref 9.8–12)
RBC # BLD AUTO: 4.84 MILLION/UL (ref 3.81–5.12)
RBC #/AREA URNS AUTO: ABNORMAL /HPF
SODIUM SERPL-SCNC: 140 MMOL/L (ref 136–145)
SP GR UR STRIP.AUTO: 1.01 (ref 1–1.03)
TROPONIN I SERPL-MCNC: <0.02 NG/ML
TROPONIN I SERPL-MCNC: <0.02 NG/ML
TSH SERPL DL<=0.05 MIU/L-ACNC: 1.27 UIU/ML (ref 0.36–3.74)
UROBILINOGEN UR QL STRIP.AUTO: 0.2 E.U./DL
WBC # BLD AUTO: 11.2 THOUSAND/UL (ref 4.31–10.16)
WBC #/AREA URNS AUTO: ABNORMAL /HPF

## 2020-02-02 PROCEDURE — 83735 ASSAY OF MAGNESIUM: CPT | Performed by: EMERGENCY MEDICINE

## 2020-02-02 PROCEDURE — 81001 URINALYSIS AUTO W/SCOPE: CPT | Performed by: EMERGENCY MEDICINE

## 2020-02-02 PROCEDURE — 82550 ASSAY OF CK (CPK): CPT | Performed by: EMERGENCY MEDICINE

## 2020-02-02 PROCEDURE — 96374 THER/PROPH/DIAG INJ IV PUSH: CPT

## 2020-02-02 PROCEDURE — 99285 EMERGENCY DEPT VISIT HI MDM: CPT

## 2020-02-02 PROCEDURE — 93005 ELECTROCARDIOGRAM TRACING: CPT

## 2020-02-02 PROCEDURE — 99285 EMERGENCY DEPT VISIT HI MDM: CPT | Performed by: EMERGENCY MEDICINE

## 2020-02-02 PROCEDURE — 85730 THROMBOPLASTIN TIME PARTIAL: CPT | Performed by: EMERGENCY MEDICINE

## 2020-02-02 PROCEDURE — 83880 ASSAY OF NATRIURETIC PEPTIDE: CPT | Performed by: EMERGENCY MEDICINE

## 2020-02-02 PROCEDURE — 36415 COLL VENOUS BLD VENIPUNCTURE: CPT | Performed by: EMERGENCY MEDICINE

## 2020-02-02 PROCEDURE — 83690 ASSAY OF LIPASE: CPT | Performed by: EMERGENCY MEDICINE

## 2020-02-02 PROCEDURE — 85610 PROTHROMBIN TIME: CPT | Performed by: EMERGENCY MEDICINE

## 2020-02-02 PROCEDURE — 96361 HYDRATE IV INFUSION ADD-ON: CPT

## 2020-02-02 PROCEDURE — 85025 COMPLETE CBC W/AUTO DIFF WBC: CPT | Performed by: EMERGENCY MEDICINE

## 2020-02-02 PROCEDURE — 84443 ASSAY THYROID STIM HORMONE: CPT | Performed by: EMERGENCY MEDICINE

## 2020-02-02 PROCEDURE — 71045 X-RAY EXAM CHEST 1 VIEW: CPT

## 2020-02-02 PROCEDURE — 84484 ASSAY OF TROPONIN QUANT: CPT | Performed by: EMERGENCY MEDICINE

## 2020-02-02 PROCEDURE — C9113 INJ PANTOPRAZOLE SODIUM, VIA: HCPCS | Performed by: EMERGENCY MEDICINE

## 2020-02-02 PROCEDURE — 80053 COMPREHEN METABOLIC PANEL: CPT | Performed by: EMERGENCY MEDICINE

## 2020-02-02 RX ORDER — PANTOPRAZOLE SODIUM 40 MG/1
40 INJECTION, POWDER, FOR SOLUTION INTRAVENOUS ONCE
Status: COMPLETED | OUTPATIENT
Start: 2020-02-02 | End: 2020-02-02

## 2020-02-02 RX ORDER — ACETAMINOPHEN 325 MG/1
650 TABLET ORAL ONCE
Status: COMPLETED | OUTPATIENT
Start: 2020-02-02 | End: 2020-02-02

## 2020-02-02 RX ORDER — PANTOPRAZOLE SODIUM 40 MG/1
40 TABLET, DELAYED RELEASE ORAL DAILY
Qty: 30 TABLET | Refills: 0 | Status: SHIPPED | OUTPATIENT
Start: 2020-02-02 | End: 2020-02-02 | Stop reason: SDUPTHER

## 2020-02-02 RX ORDER — PANTOPRAZOLE SODIUM 40 MG/1
40 TABLET, DELAYED RELEASE ORAL DAILY
Qty: 30 TABLET | Refills: 0 | Status: SHIPPED | OUTPATIENT
Start: 2020-02-02 | End: 2020-10-21

## 2020-02-02 RX ORDER — SODIUM CHLORIDE 9 MG/ML
125 INJECTION, SOLUTION INTRAVENOUS CONTINUOUS
Status: DISCONTINUED | OUTPATIENT
Start: 2020-02-02 | End: 2020-02-02

## 2020-02-02 RX ADMIN — SODIUM CHLORIDE 1000 ML: 0.9 INJECTION, SOLUTION INTRAVENOUS at 17:31

## 2020-02-02 RX ADMIN — SODIUM CHLORIDE 125 ML/HR: 0.9 INJECTION, SOLUTION INTRAVENOUS at 17:15

## 2020-02-02 RX ADMIN — PANTOPRAZOLE SODIUM 40 MG: 40 INJECTION, POWDER, FOR SOLUTION INTRAVENOUS at 17:25

## 2020-02-02 RX ADMIN — ACETAMINOPHEN 650 MG: 325 TABLET, FILM COATED ORAL at 19:02

## 2020-02-02 NOTE — ED PROVIDER NOTES
History  Chief Complaint   Patient presents with    Chest Pain     Pt c/o chest tightness and left leg numbness since yesterday  Leg numbness comes and goes  73-year-old female with past medical history of costochondritis, reflux, arthritis, cerebral aneurysm with meningioma, Lyme disease, presents to the ER with complaint of tingling sensation at the bottom of her bilateral feet with pulling sensation to the musculatures of both legs up to her hips since yesterday  Patient also feels some heaviness in the substernal region of her chest since last night  Both symptoms are intermittent  Patient came to the ED for further evaluation  Patient denies any previous heart attacks or strokes  Patient denies any early cardiovascular history in first-degree relatives  Patient did not take anything for symptoms at home  Patient used to be on Pepcid few years ago for her reflux  Patient discontinue the medication as she no longer needed it  Patient did say that she had a greasy [place] meal for dinner last night  Patient currently denies having any chest pressure or pain  History provided by:  Patient  Chest Pain   Associated symptoms: no abdominal pain, no back pain, no cough, no dizziness, no fever, no headache, no nausea, no numbness, no palpitations, no shortness of breath and no weakness        Prior to Admission Medications   Prescriptions Last Dose Informant Patient Reported? Taking? Cholecalciferol (VITAMIN D3 PO)   Yes No   Sig: Take by mouth every morning  Liquid 2000 units   MAGNESIUM PO   Yes No   Sig: Take 50 mg by mouth every morning     Multiple Vitamins-Minerals (HAIR SKIN AND NAILS FORMULA PO)   Yes No   Sig: Take by mouth daily   Omega-3 Fatty Acids (FISH OIL CONCENTRATE PO)   Yes No   Sig: Take by mouth daily   co-enzyme Q-10 30 MG capsule   Yes No   Sig: Take 30 mg by mouth daily   doxycycline hyclate (VIBRA-TABS) 100 mg tablet   Yes No   Sig: Take 100 mg by mouth 2 (two) times a day ibuprofen (MOTRIN) 200 mg tablet  Self Yes No   Sig: Take by mouth 4 (four) times a day      Facility-Administered Medications: None       Past Medical History:   Diagnosis Date    Acid reflux     Aneurysm ([address] )     is in a cavernous area with an meningioma-followed by MRI (left side)    Aneurysm ([address] )     brain    Arthritis     fingers    Bite from insect     had several wasp bites on each arm  7/11/`6    Iron disorder     high level- 199, gave i unit of blood, level now may be lower    Lyme disease        Past Surgical History:   Procedure Laterality Date    ADRENALECTOMY Left 2010    ADRENALECTOMY Left 2010    APPENDECTOMY      CATARACT EXTRACTION      COLONOSCOPY      CYSTOSCOPY      ESOPHAGOGASTRODUODENOSCOPY  12/2015    FOOT SURGERY Bilateral     bunionectomy, neuroma removed ezequiel , hammertoe ezequiel   KS EGD TRANSORAL BIOPSY SINGLE/MULTIPLE N/A 8/9/2017    Procedure: ESOPHAGOGASTRODUODENOSCOPY (EGD); Surgeon: Dunia Dunn MD;  Location: [facility]; Service: Gastroenterology    KS XCAPSL CTRC RMVL INSJ IO LENS PROSTH W/O ECP Left 7/21/2016    Procedure: EXTRACTION EXTRACAPSULAR CATARACT PHACO INTRAOCULAR LENS (IOL); Surgeon: Zeenat Castellano MD;  Location: [facility];  Service: Ophthalmology    KS XCAPSL CTRC RMVL INSJ IO LENS PROSTH W/O ECP Right 6/23/2016    Procedure: EXTRACTION EXTRACAPSULAR CATARACT PHACO INTRAOCULAR LENS (IOL); Surgeon: Zeenat Castellano MD;  Location: [facility];  Service: Ophthalmology    TONSILLECTOMY AND ADENOIDECTOMY      TUBAL LIGATION         Family History   Problem Relation Age of Onset    COPD Mother         age 80    Hypertension Mother     Transient ischemic attack Father     Hypertension Father     Cancer Sister 70        breast    Heart disease Sister         silent MI from chemo     I have reviewed and agree with the history as documented      Social History     Tobacco Use    Smoking status: Never Smoker    Smokeless tobacco: Never Used Substance Use Topics    Alcohol use: Never     Frequency: Never    Drug use: No        Review of Systems   Constitutional: Negative for activity change, appetite change, chills and fever  HENT: Negative for congestion and ear pain  Eyes: Negative for pain and discharge  Respiratory: Negative for cough, chest tightness, shortness of breath, wheezing and stridor  Cardiovascular: Positive for chest pain  Negative for palpitations  Gastrointestinal: Negative for abdominal distention, abdominal pain, constipation, diarrhea and nausea  Endocrine: Negative for cold intolerance  Genitourinary: Negative for dysuria, frequency and urgency  Musculoskeletal: Positive for joint swelling  Negative for arthralgias and back pain  Skin: Negative for color change and rash  Allergic/Immunologic: Negative for environmental allergies and food allergies  Neurological: Negative for dizziness, weakness, numbness and headaches  Tingling to bilateral feet  Hematological: Negative for adenopathy  Psychiatric/Behavioral: Negative for agitation, behavioral problems and confusion  The patient is not nervous/anxious  All other systems reviewed and are negative  Physical Exam  Physical Exam   Constitutional: She is oriented to person, place, and time  She appears well-developed and well-nourished  HENT:   Head: Normocephalic and atraumatic  Mouth/Throat: Oropharynx is clear and moist    Eyes: Conjunctivae and EOM are normal    Neck: Normal range of motion  Neck supple  Cardiovascular: Normal rate, regular rhythm, normal heart sounds and intact distal pulses  Pulmonary/Chest: Effort normal and breath sounds normal    Abdominal: Soft  Bowel sounds are normal  She exhibits no distension  There is no tenderness  Musculoskeletal: Normal range of motion  Pulses intact bilateral lower extremities    No edema, erythema, tenderness to palpation noted on musculature of bilateral lower extremities  Sensation intact to bilateral lower extremities  No paresthesia palpated during my exam    Neurological: She is alert and oriented to person, place, and time  Skin: Skin is warm and dry  Psychiatric: She has a normal mood and affect  Her behavior is normal  Judgment and thought content normal    Nursing note and vitals reviewed        Vital Signs  ED Triage Vitals [02/02/20 1704]   Temperature Pulse Respirations Blood Pressure SpO2   98 3 °F (36 8 °C) 95 18 (!) 173/84 99 %      Temp Source Heart Rate Source Patient Position - Orthostatic VS BP Location FiO2 (%)   Oral Monitor Lying Right arm --      Pain Score       2           Vitals:    02/02/20 1915 02/02/20 1930 02/02/20 1945 02/02/20 2000   BP: 134/72 132/70 134/67 148/76   Pulse: 70 70 66 66   Patient Position - Orthostatic VS:             Visual Acuity      ED Medications  Medications   pantoprazole (PROTONIX) injection 40 mg (40 mg Intravenous Given 2/2/20 1725)   sodium chloride 0 9 % bolus 1,000 mL (0 mL Intravenous Stopped 2/2/20 1831)   acetaminophen (TYLENOL) tablet 650 mg (650 mg Oral Given 2/2/20 1902)       Diagnostic Studies  Results Reviewed     Procedure Component Value Units Date/Time    Troponin I [[id number]]  (Normal) Collected:  02/02/20 2010    Lab Status:  Final result Specimen:  Blood from Arm, Left Updated:  02/02/20 2034     Troponin I <0 02 ng/mL     Urine Microscopic [[id number]]  (Abnormal) Collected:  02/02/20 1830    Lab Status:  Final result Specimen:  Urine, Clean Catch Updated:  02/02/20 1851     RBC, UA 0-1 /hpf      WBC, UA 0-1 /hpf      Epithelial Cells None Seen /hpf      Bacteria, UA None Seen /hpf     UA w Reflex to Microscopic w Reflex to Culture [[id number]]  (Abnormal) Collected:  02/02/20 1830    Lab Status:  Final result Specimen:  Urine, Clean Catch Updated:  02/02/20 1836     Color, UA Light Yellow     Clarity, UA Clear     Specific Gravity, UA 1 010     pH, UA 5 5     Leukocytes, UA Negative Nitrite, UA Negative     Protein, UA Negative mg/dl      Glucose, UA Negative mg/dl      Ketones, UA 15 (1+) mg/dl      Urobilinogen, UA 0 2 E U /dl      Bilirubin, UA Negative     Blood, UA Trace-Intact    Magnesium [[id number]]  (Normal) Collected:  02/02/20 1713    Lab Status:  Final result Specimen:  Blood from Arm, Left Updated:  02/02/20 1751     Magnesium 2 2 mg/dL     TSH, 3rd generation with Free T4 reflex [[id number]]  (Normal) Collected:  02/02/20 1713    Lab Status:  Final result Specimen:  Blood from Arm, Left Updated:  02/02/20 1751     TSH 3RD GENERATON 1 266 uIU/mL     Narrative:       Patients undergoing fluorescein dye angiography may retain small amounts of fluorescein in the body for 48-72 hours post procedure  Samples containing fluorescein can produce falsely depressed TSH values  If the patient had this procedure,a specimen should be resubmitted post fluorescein clearance        Lipase [[id number]]  (Normal) Collected:  02/02/20 1713    Lab Status:  Final result Specimen:  Blood from Arm, Left Updated:  02/02/20 1751     Lipase 114 u/L     NT-BNP PRO [[id number]]  (Normal) Collected:  02/02/20 1713    Lab Status:  Final result Specimen:  Blood from Arm, Left Updated:  02/02/20 1744     NT-proBNP 109 pg/mL     CK (with reflex to MB) [[id number]]  (Normal) Collected:  02/02/20 1713    Lab Status:  Final result Specimen:  Blood from Arm, Left Updated:  02/02/20 1744     Total CK 52 U/L     Troponin I [[id number]]  (Normal) Collected:  02/02/20 1713    Lab Status:  Final result Specimen:  Blood from Arm, Left Updated:  02/02/20 1737     Troponin I <0 02 ng/mL     Protime-INR [[id number]]  (Normal) Collected:  02/02/20 1713    Lab Status:  Final result Specimen:  Blood from Arm, Left Updated:  02/02/20 1736     Protime 10 2 seconds      INR 0 94    APTT [[id number]]  (Normal) Collected:  02/02/20 1713    Lab Status:  Final result Specimen:  Blood from Arm, Left Updated:  02/02/20 1736     PTT 29 seconds Comprehensive metabolic panel [[id number]] Collected:  02/02/20 1713    Lab Status:  Final result Specimen:  Blood from Arm, Left Updated:  02/02/20 1736     Sodium 140 mmol/L      Potassium 3 9 mmol/L      Chloride 103 mmol/L      CO2 28 mmol/L      ANION GAP 9 mmol/L      BUN 20 mg/dL      Creatinine 0 97 mg/dL      Glucose 98 mg/dL      Calcium 9 3 mg/dL      AST 22 U/L      ALT 32 U/L      Alkaline Phosphatase 86 U/L      Total Protein 7 6 g/dL      Albumin 4 3 g/dL      Total Bilirubin 0 30 mg/dL      eGFR 57 ml/min/1 73sq m     Narrative:       [facility] guidelines for Chronic Kidney Disease (CKD):     Stage 1 with normal or high GFR (GFR > 90 mL/min/1 73 square meters)    Stage 2 Mild CKD (GFR = 60-89 mL/min/1 73 square meters)    Stage 3A Moderate CKD (GFR = 45-59 mL/min/1 73 square meters)    Stage 3B Moderate CKD (GFR = 30-44 mL/min/1 73 square meters)    Stage 4 Severe CKD (GFR = 15-29 mL/min/1 73 square meters)    Stage 5 End Stage CKD (GFR <15 mL/min/1 73 square meters)  Note: GFR calculation is accurate only with a steady state creatinine    CBC and differential [[id number]]  (Abnormal) Collected:  02/02/20 1713    Lab Status:  Final result Specimen:  Blood from Arm, Left Updated:  02/02/20 1718     WBC 11 20 Thousand/uL      RBC 4 84 Million/uL      Hemoglobin 14 4 g/dL      Hematocrit 45 1 %      MCV 93 fL      MCH 29 8 pg      MCHC 31 9 g/dL      RDW 12 7 %      MPV 10 8 fL      Platelets 384 Thousands/uL      nRBC 0 /100 WBCs      Neutrophils Relative 73 %      Immat GRANS % 0 %      Lymphocytes Relative 21 %      Monocytes Relative 5 %      Eosinophils Relative 0 %      Basophils Relative 1 %      Neutrophils Absolute 8 22 Thousands/µL      Immature Grans Absolute 0 05 Thousand/uL      Lymphocytes Absolute 2 30 Thousands/µL      Monocytes Absolute 0 53 Thousand/µL      Eosinophils Absolute 0 04 Thousand/µL      Basophils Absolute 0 06 Thousands/µL                  XR chest 1 view portable   Final Result by Daysi Murillo MD (13/33 [id number])      No acute cardiopulmonary disease  Workstation performed: [id number]                    Procedures  ECG 12 Lead Documentation Only  Date/Time: 2/2/2020 5:00 PM  Performed by: Keesha Phillips DO  Authorized by: Keesha Phillips DO     Indications / Diagnosis:  Chest pain  ECG reviewed by me, the ED Provider: yes    Patient location:  ED  Previous ECG:     Previous ECG:  Compared to current    Similarity:  No change    Comparison to cardiac monitor: Yes    Interpretation:     Interpretation: abnormal    Comments:      Sinus rhythm, rate 93, normal axis, normal intervals, no acute ST elevations noted, low amplitude T-wave noted throughout, Q-waves noted to V1 through V3 suggesting anteroseptal infarct of undetermined age that is unchanged from previous study  Essentially unchanged EKG overall from previous study  ED Course  ED Course as of Feb 02 2048   Zeb Ya Feb 02, 2020   8555 Patient re-examined at bedside  Patient is feeling better  Patient no longer has any chest pain  Patient has muscle tightness is starting to relief with IV fluids              HEART Risk Score      Most Recent Value   History  0 Filed at: 02/02/2020 1906   ECG  1 Filed at: 02/02/2020 1906   Age  2 Filed at: 02/02/2020 1906   Risk Factors  0 Filed at: 02/02/2020 1906   Troponin  0 Filed at: 02/02/2020 1906   Heart Score Risk Calculator   History  0 Filed at: 02/02/2020 1906   ECG  1 Filed at: 02/02/2020 1906   Age  2 Filed at: 02/02/2020 1906   Risk Factors  0 Filed at: 02/02/2020 1906   Troponin  0 Filed at: 02/02/2020 1906   HEART Score  3 Filed at: 02/02/2020 1906   HEART Score  3 Filed at: 02/02/2020 1906                            MDM  Number of Diagnoses or Management Options  Acid reflux: new and requires workup  Chest pain: new and requires workup  Leg pain: new and requires workup  Diagnosis management comments: Obtain blood work, EKG, chest x-ray  Give IV fluids, Protonix and reassess symptoms       Amount and/or Complexity of Data Reviewed  Clinical lab tests: ordered and reviewed  Tests in the radiology section of CPT®: ordered and reviewed  Tests in the medicine section of CPT®: ordered and reviewed  Review and summarize past medical records: yes  Independent visualization of images, tracings, or specimens: yes    Risk of Complications, Morbidity, and/or Mortality  General comments: Patient's blood work was essentially unremarkable  Patient's heart score was 3 which places are in the low risk category  Patient had to consecutive negative troponins in the ED  EKG was unchanged from baseline  Chest x-ray was unremarkable  Patient's UA shows some ketonuria suggesting some mild dehydration  Patient's leg pain improved in the ED with IV fluids  Patient's chest heaviness also did improved with Protonix in the ED  At this time patient is discharged home with follow-up to GI for further evaluation of her reflux symptoms  Patient placed on Protonix until she follows up with her GI physician  Patient also given follow-up to Cardiology for further evaluation of her chest pain  Patient also told to follow up with her family physician and discuss obtaining EMG to rule out neuropathy in her legs  Close return instructions given to return to the ER for any worsening symptoms  Patient agrees with discharge plan  Patient well appearing at time of discharge        Patient Progress  Patient progress: improved        Disposition  Final diagnoses:   Leg pain   Chest pain   Acid reflux     Time reflects when diagnosis was documented in both MDM as applicable and the Disposition within this note     Time User Action Codes Description Comment    2/2/2020  8:38 PM Sofi Ortez Add [M79 606] Leg pain     2/2/2020  8:38 PM Sofi Ortez Add [R07 9] Chest pain     2/2/2020  8:38 PM Debi Kincaid Add [K21 9] Acid reflux       ED Disposition     ED Disposition Condition Date/Time Comment    Discharge Stable Sun Feb 2, 2020  8:39 PM [facility] discharge to home/self care  Follow-up Information     Follow up With Specialties Details Why Contact Info    Garcia Amaral MD Internal Medicine In 3 days Please discuss obtaining at a EMG of your lower extremities to rule out neuropathy  [address]  [facility]  Please call to make a follow-up appointment for further evaluation of your chest pain  Cyrus Perez MD Gastroenterology  Please follow-up with your GI physician for further evaluation of your reflux symptoms  [address] [facility]            Current Discharge Medication List      START taking these medications    Details   pantoprazole (PROTONIX) 40 mg tablet Take 1 tablet (40 mg total) by mouth daily  Qty: 30 tablet, Refills: 0    Associated Diagnoses: Acid reflux         CONTINUE these medications which have NOT CHANGED    Details   Cholecalciferol (VITAMIN D3 PO) Take by mouth every morning  Liquid 2000 units      co-enzyme Q-10 30 MG capsule Take 30 mg by mouth daily      doxycycline hyclate (VIBRA-TABS) 100 mg tablet Take 100 mg by mouth 2 (two) times a day      ibuprofen (MOTRIN) 200 mg tablet Take by mouth 4 (four) times a day      MAGNESIUM PO Take 50 mg by mouth every morning  Multiple Vitamins-Minerals (HAIR SKIN AND NAILS FORMULA PO) Take by mouth daily      Omega-3 Fatty Acids (FISH OIL CONCENTRATE PO) Take by mouth daily           No discharge procedures on file      ED Provider  Electronically Signed by           Cristina Butt DO  02/02/20 2049

## 2020-02-04 LAB
ATRIAL RATE: 93 BPM
P AXIS: 71 DEGREES
PR INTERVAL: 168 MS
QRS AXIS: 42 DEGREES
QRSD INTERVAL: 78 MS
QT INTERVAL: 350 MS
QTC INTERVAL: 435 MS
T WAVE AXIS: 79 DEGREES
VENTRICULAR RATE: 93 BPM

## 2020-02-04 PROCEDURE — 93010 ELECTROCARDIOGRAM REPORT: CPT | Performed by: INTERNAL MEDICINE

## 2020-02-13 ENCOUNTER — APPOINTMENT (OUTPATIENT)
Dept: LAB | Facility: HOSPITAL | Age: 80
End: 2020-02-13
Payer: MEDICARE

## 2020-02-13 ENCOUNTER — TRANSCRIBE ORDERS (OUTPATIENT)
Dept: ADMINISTRATIVE | Facility: HOSPITAL | Age: 80
End: 2020-02-13

## 2020-02-13 DIAGNOSIS — E34.9 MULTIPLE ENDOCRINE DEFICIENCY SYNDROME: ICD-10-CM

## 2020-02-13 DIAGNOSIS — A69.20 LYME DISEASE: Primary | ICD-10-CM

## 2020-02-13 DIAGNOSIS — A69.20 LYME DISEASE: ICD-10-CM

## 2020-02-13 LAB — CORTIS SERPL-MCNC: 20.3 UG/DL

## 2020-02-13 PROCEDURE — 82533 TOTAL CORTISOL: CPT

## 2020-02-13 PROCEDURE — 86618 LYME DISEASE ANTIBODY: CPT

## 2020-02-13 PROCEDURE — 82627 DEHYDROEPIANDROSTERONE: CPT

## 2020-02-13 PROCEDURE — 36415 COLL VENOUS BLD VENIPUNCTURE: CPT

## 2020-02-13 PROCEDURE — 86617 LYME DISEASE ANTIBODY: CPT

## 2020-02-14 LAB
B BURGDOR IGG+IGM SER-ACNC: <0.91 ISR (ref 0–0.9)
DHEA-S SERPL-MCNC: 139.2 UG/DL (ref 13.9–142.8)

## 2020-02-19 LAB
B BURGDOR IGG PATRN SER IB-IMP: POSITIVE
B BURGDOR IGM PATRN SER IB-IMP: POSITIVE
B BURGDOR18KD IGG SER QL IB: PRESENT
B BURGDOR23KD IGG SER QL IB: PRESENT
B BURGDOR23KD IGM SER QL IB: PRESENT
B BURGDOR28KD IGG SER QL IB: ABNORMAL
B BURGDOR30KD IGG SER QL IB: ABNORMAL
B BURGDOR39KD IGG SER QL IB: PRESENT
B BURGDOR39KD IGM SER QL IB: ABNORMAL
B BURGDOR41KD IGG SER QL IB: PRESENT
B BURGDOR41KD IGM SER QL IB: PRESENT
B BURGDOR45KD IGG SER QL IB: ABNORMAL
B BURGDOR58KD IGG SER QL IB: ABNORMAL
B BURGDOR66KD IGG SER QL IB: ABNORMAL
B BURGDOR93KD IGG SER QL IB: PRESENT

## 2020-05-27 ENCOUNTER — APPOINTMENT (OUTPATIENT)
Dept: LAB | Facility: CLINIC | Age: 80
End: 2020-05-27
Payer: MEDICARE

## 2020-05-27 ENCOUNTER — TRANSCRIBE ORDERS (OUTPATIENT)
Dept: LAB | Facility: CLINIC | Age: 80
End: 2020-05-27

## 2020-05-27 DIAGNOSIS — A69.20 LYME DISEASE: ICD-10-CM

## 2020-05-27 DIAGNOSIS — Z20.828 EXPOSURE TO SARS-ASSOCIATED CORONAVIRUS: ICD-10-CM

## 2020-05-27 DIAGNOSIS — E66.8 OBESITY OF ENDOCRINE ORIGIN: ICD-10-CM

## 2020-05-27 DIAGNOSIS — E55.9 AVITAMINOSIS D: ICD-10-CM

## 2020-05-27 DIAGNOSIS — E60 CHRONIC ZINC DEFICIENCY: ICD-10-CM

## 2020-05-27 DIAGNOSIS — E53.8 BIOTIN-(PROPIONYL-COA-CARBOXYLASE) LIGASE DEFICIENCY: ICD-10-CM

## 2020-05-27 DIAGNOSIS — R53.83 OTHER FATIGUE: Primary | ICD-10-CM

## 2020-05-27 DIAGNOSIS — R53.83 OTHER FATIGUE: ICD-10-CM

## 2020-05-27 LAB
25(OH)D3 SERPL-MCNC: 69.8 NG/ML (ref 30–100)
ALBUMIN SERPL BCP-MCNC: 3.9 G/DL (ref 3.5–5)
ALP SERPL-CCNC: 84 U/L (ref 46–116)
ALT SERPL W P-5'-P-CCNC: 21 U/L (ref 12–78)
ANION GAP SERPL CALCULATED.3IONS-SCNC: 5 MMOL/L (ref 4–13)
AST SERPL W P-5'-P-CCNC: 16 U/L (ref 5–45)
BASOPHILS # BLD AUTO: 0.05 THOUSANDS/ΜL (ref 0–0.1)
BASOPHILS NFR BLD AUTO: 1 % (ref 0–1)
BILIRUB DIRECT SERPL-MCNC: 0.19 MG/DL (ref 0–0.2)
BILIRUB SERPL-MCNC: 0.75 MG/DL (ref 0.2–1)
BUN SERPL-MCNC: 14 MG/DL (ref 5–25)
CALCIUM SERPL-MCNC: 9.5 MG/DL (ref 8.3–10.1)
CHLORIDE SERPL-SCNC: 104 MMOL/L (ref 100–108)
CO2 SERPL-SCNC: 29 MMOL/L (ref 21–32)
CORTIS SERPL-MCNC: 28.9 UG/DL
CREAT SERPL-MCNC: 0.76 MG/DL (ref 0.6–1.3)
EOSINOPHIL # BLD AUTO: 0.06 THOUSAND/ΜL (ref 0–0.61)
EOSINOPHIL NFR BLD AUTO: 1 % (ref 0–6)
ERYTHROCYTE [DISTWIDTH] IN BLOOD BY AUTOMATED COUNT: 13.2 % (ref 11.6–15.1)
FERRITIN SERPL-MCNC: 90 NG/ML (ref 8–388)
GFR SERPL CREATININE-BSD FRML MDRD: 76 ML/MIN/1.73SQ M
GLUCOSE P FAST SERPL-MCNC: 88 MG/DL (ref 65–99)
HCT VFR BLD AUTO: 41.9 % (ref 34.8–46.1)
HGB BLD-MCNC: 13.3 G/DL (ref 11.5–15.4)
IMM GRANULOCYTES # BLD AUTO: 0.01 THOUSAND/UL (ref 0–0.2)
IMM GRANULOCYTES NFR BLD AUTO: 0 % (ref 0–2)
IRON SERPL-MCNC: 144 UG/DL (ref 50–170)
LYMPHOCYTES # BLD AUTO: 2.56 THOUSANDS/ΜL (ref 0.6–4.47)
LYMPHOCYTES NFR BLD AUTO: 39 % (ref 14–44)
MCH RBC QN AUTO: 29.4 PG (ref 26.8–34.3)
MCHC RBC AUTO-ENTMCNC: 31.7 G/DL (ref 31.4–37.4)
MCV RBC AUTO: 93 FL (ref 82–98)
MONOCYTES # BLD AUTO: 0.47 THOUSAND/ΜL (ref 0.17–1.22)
MONOCYTES NFR BLD AUTO: 7 % (ref 4–12)
NEUTROPHILS # BLD AUTO: 3.48 THOUSANDS/ΜL (ref 1.85–7.62)
NEUTS SEG NFR BLD AUTO: 52 % (ref 43–75)
NRBC BLD AUTO-RTO: 0 /100 WBCS
PLATELET # BLD AUTO: 236 THOUSANDS/UL (ref 149–390)
PMV BLD AUTO: 11.8 FL (ref 8.9–12.7)
POTASSIUM SERPL-SCNC: 3.8 MMOL/L (ref 3.5–5.3)
PROT SERPL-MCNC: 7.1 G/DL (ref 6.4–8.2)
RBC # BLD AUTO: 4.52 MILLION/UL (ref 3.81–5.12)
SODIUM SERPL-SCNC: 138 MMOL/L (ref 136–145)
T3FREE SERPL-MCNC: 2.47 PG/ML (ref 2.3–4.2)
T4 FREE SERPL-MCNC: 0.88 NG/DL (ref 0.76–1.46)
TSH SERPL DL<=0.05 MIU/L-ACNC: 2.03 UIU/ML (ref 0.36–3.74)
VIT B12 SERPL-MCNC: 566 PG/ML (ref 100–900)
WBC # BLD AUTO: 6.63 THOUSAND/UL (ref 4.31–10.16)

## 2020-05-27 PROCEDURE — 83540 ASSAY OF IRON: CPT

## 2020-05-27 PROCEDURE — 82607 VITAMIN B-12: CPT

## 2020-05-27 PROCEDURE — 84443 ASSAY THYROID STIM HORMONE: CPT

## 2020-05-27 PROCEDURE — 85025 COMPLETE CBC W/AUTO DIFF WBC: CPT

## 2020-05-27 PROCEDURE — 84439 ASSAY OF FREE THYROXINE: CPT

## 2020-05-27 PROCEDURE — 82627 DEHYDROEPIANDROSTERONE: CPT

## 2020-05-27 PROCEDURE — 82728 ASSAY OF FERRITIN: CPT

## 2020-05-27 PROCEDURE — 86769 SARS-COV-2 COVID-19 ANTIBODY: CPT

## 2020-05-27 PROCEDURE — 80048 BASIC METABOLIC PNL TOTAL CA: CPT

## 2020-05-27 PROCEDURE — 84630 ASSAY OF ZINC: CPT

## 2020-05-27 PROCEDURE — 86617 LYME DISEASE ANTIBODY: CPT

## 2020-05-27 PROCEDURE — 82533 TOTAL CORTISOL: CPT

## 2020-05-27 PROCEDURE — 84481 FREE ASSAY (FT-3): CPT

## 2020-05-27 PROCEDURE — 86618 LYME DISEASE ANTIBODY: CPT

## 2020-05-27 PROCEDURE — 80076 HEPATIC FUNCTION PANEL: CPT

## 2020-05-27 PROCEDURE — 36415 COLL VENOUS BLD VENIPUNCTURE: CPT

## 2020-05-27 PROCEDURE — 82306 VITAMIN D 25 HYDROXY: CPT

## 2020-05-28 LAB
B BURGDOR IGG+IGM SER-ACNC: <0.91 ISR (ref 0–0.9)
DHEA SERPL-MCNC: 515 NG/DL (ref 31–701)
SARS-COV-2 IGG SERPL QL IA: NEGATIVE
SARS-COV-2 IGM SERPL QL IA: NEGATIVE

## 2020-05-29 LAB — ZINC SERPL-MCNC: 99 UG/DL (ref 56–134)

## 2020-06-06 LAB
B BURGDOR IGG PATRN SER IB-IMP: NEGATIVE
B BURGDOR IGM PATRN SER IB-IMP: POSITIVE
B BURGDOR18KD IGG SER QL IB: PRESENT
B BURGDOR23KD IGG SER QL IB: PRESENT
B BURGDOR23KD IGM SER QL IB: PRESENT
B BURGDOR28KD IGG SER QL IB: ABNORMAL
B BURGDOR30KD IGG SER QL IB: ABNORMAL
B BURGDOR39KD IGG SER QL IB: PRESENT
B BURGDOR39KD IGM SER QL IB: ABNORMAL
B BURGDOR41KD IGG SER QL IB: ABNORMAL
B BURGDOR41KD IGM SER QL IB: PRESENT
B BURGDOR45KD IGG SER QL IB: ABNORMAL
B BURGDOR58KD IGG SER QL IB: ABNORMAL
B BURGDOR66KD IGG SER QL IB: ABNORMAL
B BURGDOR93KD IGG SER QL IB: PRESENT
DHEA-S SERPL-MCNC: 104 UG/DL (ref 13.9–142.8)

## 2020-06-11 ENCOUNTER — APPOINTMENT (OUTPATIENT)
Dept: LAB | Facility: CLINIC | Age: 80
End: 2020-06-11
Payer: MEDICARE

## 2020-06-11 ENCOUNTER — TRANSCRIBE ORDERS (OUTPATIENT)
Dept: LAB | Facility: CLINIC | Age: 80
End: 2020-06-11

## 2020-06-11 DIAGNOSIS — N39.0 LOWER URINARY TRACT INFECTIOUS DISEASE: ICD-10-CM

## 2020-06-11 DIAGNOSIS — M05.441 RHEUMATOID MYOPATHY WITH RHEUMATOID ARTHRITIS OF RIGHT HAND (HCC): ICD-10-CM

## 2020-06-11 DIAGNOSIS — Z88.2 PERSONAL HISTORY OF ALLERGY TO SULFONAMIDES: ICD-10-CM

## 2020-06-11 DIAGNOSIS — M81.0 SENILE OSTEOPOROSIS: ICD-10-CM

## 2020-06-11 DIAGNOSIS — T78.49XA: ICD-10-CM

## 2020-06-11 DIAGNOSIS — Z91.018 ALLERGY TO OTHER FOODS: ICD-10-CM

## 2020-06-11 DIAGNOSIS — Z91.010 ALLERGY TO PEANUTS: ICD-10-CM

## 2020-06-11 DIAGNOSIS — Z88.0 PERSONAL HISTORY OF ALLERGY TO PENICILLIN: Primary | ICD-10-CM

## 2020-06-11 DIAGNOSIS — Z91.018 ALLERGY, FOOD: ICD-10-CM

## 2020-06-11 DIAGNOSIS — T78.49XA POSITIVE RAST TESTING: ICD-10-CM

## 2020-06-11 LAB
BACTERIA UR QL AUTO: ABNORMAL /HPF
BILIRUB UR QL STRIP: NEGATIVE
CLARITY UR: CLEAR
COLOR UR: YELLOW
CRP SERPL QL: <3 MG/L
GLUCOSE UR STRIP-MCNC: NEGATIVE MG/DL
HGB UR QL STRIP.AUTO: NEGATIVE
HYALINE CASTS #/AREA URNS LPF: ABNORMAL /LPF
KETONES UR STRIP-MCNC: NEGATIVE MG/DL
LEUKOCYTE ESTERASE UR QL STRIP: ABNORMAL
NITRITE UR QL STRIP: NEGATIVE
NON-SQ EPI CELLS URNS QL MICRO: ABNORMAL /HPF
PH UR STRIP.AUTO: 7 [PH]
PROT UR STRIP-MCNC: NEGATIVE MG/DL
RBC #/AREA URNS AUTO: ABNORMAL /HPF
SP GR UR STRIP.AUTO: 1.01 (ref 1–1.03)
UROBILINOGEN UR QL STRIP.AUTO: 0.2 E.U./DL
WBC #/AREA URNS AUTO: ABNORMAL /HPF

## 2020-06-11 PROCEDURE — 82627 DEHYDROEPIANDROSTERONE: CPT

## 2020-06-11 PROCEDURE — 82785 ASSAY OF IGE: CPT

## 2020-06-11 PROCEDURE — 81001 URINALYSIS AUTO W/SCOPE: CPT

## 2020-06-11 PROCEDURE — 87086 URINE CULTURE/COLONY COUNT: CPT

## 2020-06-11 PROCEDURE — 86430 RHEUMATOID FACTOR TEST QUAL: CPT

## 2020-06-11 PROCEDURE — 82523 COLLAGEN CROSSLINKS: CPT

## 2020-06-11 PROCEDURE — 36415 COLL VENOUS BLD VENIPUNCTURE: CPT

## 2020-06-11 PROCEDURE — 86140 C-REACTIVE PROTEIN: CPT

## 2020-06-11 PROCEDURE — 86003 ALLG SPEC IGE CRUDE XTRC EA: CPT

## 2020-06-12 LAB
ALLERGEN COMMENT: NORMAL
ALMOND IGE QN: <0.1 KUA/I
BACTERIA UR CULT: NORMAL
BRAZIL NUT IGE QN: <0.1 KUA/I
CASHEW NUT IGE QN: <0.1 KUA/I
HAZELNUT IGE QN: <0.1 KUA/L
PEANUT IGE QN: <0.1 KUA/I
PECAN/HICK NUT IGE QN: <0.1 KUA/I
PISTACHIO IGE QN: <0.1 KUA/I
RHEUMATOID FACT SER QL LA: NEGATIVE
TOTAL IGE SMQN RAST: <2 KU/L (ref 0–113)
WALNUT IGE QN: <0.1 KUA/I

## 2020-06-13 LAB — DHEA-S SERPL-MCNC: 107 UG/DL (ref 13.9–142.8)

## 2020-06-15 LAB
A ALTERNATA IGE QN: <0.1 KU/L
A FUMIGATUS IGE QN: <0.1 KU/L
A PULLULANS IGE QN: <0.1 KU/L
C ALBICANS IGE QN: <0.1 KU/L
C HERBARUM IGE QN: <0.1 KU/L
E PURPURASCENS IGE QN: <0.1 KU/L
FUSARIUM PROLIFERATUM: <0.1 KU/L
Lab: NORMAL
M RACEMOSUS IGE QN: <0.1 KU/L
PENICILLIN G IGE QN: <0.1 KU/L
PENICILLIUM CHRYSOGENUM: <0.1 KU/L
PHOMA BETAE: <0.1 KU/L
SETOMELANOMMA ROSTRATA: <0.1 KU/L
STEMPHYLIUM HERBARUM: <0.1 KU/L

## 2020-06-16 LAB — LTX IGE QN: <0.1 KU/L

## 2020-06-17 LAB — MISCELLANEOUS LAB TEST RESULT: NORMAL

## 2020-07-24 ENCOUNTER — APPOINTMENT (OUTPATIENT)
Dept: LAB | Facility: CLINIC | Age: 80
End: 2020-07-24
Payer: MEDICARE

## 2020-07-24 ENCOUNTER — TRANSCRIBE ORDERS (OUTPATIENT)
Dept: LAB | Facility: CLINIC | Age: 80
End: 2020-07-24

## 2020-07-24 DIAGNOSIS — E53.8 BIOTIN-(PROPIONYL-COA-CARBOXYLASE) LIGASE DEFICIENCY: ICD-10-CM

## 2020-07-24 DIAGNOSIS — E66.8 OBESITY OF ENDOCRINE ORIGIN: ICD-10-CM

## 2020-07-24 DIAGNOSIS — E53.8 BIOTIN-(PROPIONYL-COA-CARBOXYLASE) LIGASE DEFICIENCY: Primary | ICD-10-CM

## 2020-07-24 DIAGNOSIS — A69.20 LYME DISEASE: Primary | ICD-10-CM

## 2020-07-24 DIAGNOSIS — A69.20 LYME DISEASE: ICD-10-CM

## 2020-07-24 LAB
CORTIS SERPL-MCNC: 24 UG/DL
IGG SERPL-MCNC: 664 MG/DL (ref 700–1600)
IGM SERPL-MCNC: 52 MG/DL (ref 40–230)
VIT B12 SERPL-MCNC: 1416 PG/ML (ref 100–900)

## 2020-07-24 PROCEDURE — 86617 LYME DISEASE ANTIBODY: CPT

## 2020-07-24 PROCEDURE — 86618 LYME DISEASE ANTIBODY: CPT

## 2020-07-24 PROCEDURE — 82533 TOTAL CORTISOL: CPT

## 2020-07-24 PROCEDURE — 82627 DEHYDROEPIANDROSTERONE: CPT

## 2020-07-24 PROCEDURE — 82607 VITAMIN B-12: CPT

## 2020-07-24 PROCEDURE — 36415 COLL VENOUS BLD VENIPUNCTURE: CPT

## 2020-07-24 PROCEDURE — 82784 ASSAY IGA/IGD/IGG/IGM EACH: CPT

## 2020-07-25 LAB — B BURGDOR IGG+IGM SER-ACNC: <0.91 ISR (ref 0–0.9)

## 2020-07-26 LAB — DHEA-S SERPL-MCNC: 116 UG/DL (ref 13.9–142.8)

## 2020-08-06 ENCOUNTER — TRANSCRIBE ORDERS (OUTPATIENT)
Dept: LAB | Facility: CLINIC | Age: 80
End: 2020-08-06

## 2020-08-06 LAB
B BURGDOR IGG PATRN SER IB-IMP: POSITIVE
B BURGDOR IGM PATRN SER IB-IMP: NEGATIVE
B BURGDOR18KD IGG SER QL IB: ABNORMAL
B BURGDOR23KD IGG SER QL IB: PRESENT
B BURGDOR23KD IGM SER QL IB: ABNORMAL
B BURGDOR28KD IGG SER QL IB: ABNORMAL
B BURGDOR30KD IGG SER QL IB: ABNORMAL
B BURGDOR39KD IGG SER QL IB: PRESENT
B BURGDOR39KD IGM SER QL IB: ABNORMAL
B BURGDOR41KD IGG SER QL IB: PRESENT
B BURGDOR41KD IGM SER QL IB: PRESENT
B BURGDOR45KD IGG SER QL IB: ABNORMAL
B BURGDOR58KD IGG SER QL IB: PRESENT
B BURGDOR66KD IGG SER QL IB: ABNORMAL
B BURGDOR93KD IGG SER QL IB: PRESENT

## 2020-08-21 ENCOUNTER — TRANSCRIBE ORDERS (OUTPATIENT)
Dept: LAB | Facility: CLINIC | Age: 80
End: 2020-08-21

## 2020-08-21 ENCOUNTER — APPOINTMENT (OUTPATIENT)
Dept: LAB | Facility: CLINIC | Age: 80
End: 2020-08-21
Payer: MEDICARE

## 2020-08-21 DIAGNOSIS — A69.20 LYME DISEASE: ICD-10-CM

## 2020-08-21 DIAGNOSIS — Z20.828 EXPOSURE TO SARS-ASSOCIATED CORONAVIRUS: ICD-10-CM

## 2020-08-21 DIAGNOSIS — A69.20 LYME DISEASE: Primary | ICD-10-CM

## 2020-08-21 LAB
BASOPHILS # BLD AUTO: 0.06 THOUSANDS/ΜL (ref 0–0.1)
BASOPHILS NFR BLD AUTO: 1 % (ref 0–1)
EOSINOPHIL # BLD AUTO: 0.08 THOUSAND/ΜL (ref 0–0.61)
EOSINOPHIL NFR BLD AUTO: 1 % (ref 0–6)
ERYTHROCYTE [DISTWIDTH] IN BLOOD BY AUTOMATED COUNT: 13.1 % (ref 11.6–15.1)
HCT VFR BLD AUTO: 45.5 % (ref 34.8–46.1)
HGB BLD-MCNC: 14.2 G/DL (ref 11.5–15.4)
IMM GRANULOCYTES # BLD AUTO: 0 THOUSAND/UL (ref 0–0.2)
IMM GRANULOCYTES NFR BLD AUTO: 0 % (ref 0–2)
LYMPHOCYTES # BLD AUTO: 2.84 THOUSANDS/ΜL (ref 0.6–4.47)
LYMPHOCYTES NFR BLD AUTO: 40 % (ref 14–44)
MCH RBC QN AUTO: 29.2 PG (ref 26.8–34.3)
MCHC RBC AUTO-ENTMCNC: 31.2 G/DL (ref 31.4–37.4)
MCV RBC AUTO: 93 FL (ref 82–98)
MONOCYTES # BLD AUTO: 0.48 THOUSAND/ΜL (ref 0.17–1.22)
MONOCYTES NFR BLD AUTO: 7 % (ref 4–12)
NEUTROPHILS # BLD AUTO: 3.68 THOUSANDS/ΜL (ref 1.85–7.62)
NEUTS SEG NFR BLD AUTO: 51 % (ref 43–75)
NRBC BLD AUTO-RTO: 0 /100 WBCS
PLATELET # BLD AUTO: 233 THOUSANDS/UL (ref 149–390)
PMV BLD AUTO: 11.7 FL (ref 8.9–12.7)
RBC # BLD AUTO: 4.87 MILLION/UL (ref 3.81–5.12)
SARS-COV-2 IGG+IGM SERPL QL IA: NORMAL
WBC # BLD AUTO: 7.14 THOUSAND/UL (ref 4.31–10.16)

## 2020-08-21 PROCEDURE — 85025 COMPLETE CBC W/AUTO DIFF WBC: CPT

## 2020-08-21 PROCEDURE — 86618 LYME DISEASE ANTIBODY: CPT

## 2020-08-21 PROCEDURE — 36415 COLL VENOUS BLD VENIPUNCTURE: CPT

## 2020-08-21 PROCEDURE — 86769 SARS-COV-2 COVID-19 ANTIBODY: CPT

## 2020-08-22 LAB — B BURGDOR IGG+IGM SER-ACNC: <0.91 ISR (ref 0–0.9)

## 2020-08-31 ENCOUNTER — TRANSCRIBE ORDERS (OUTPATIENT)
Dept: LAB | Facility: HOSPITAL | Age: 80
End: 2020-08-31

## 2020-08-31 DIAGNOSIS — A69.20 LYME DISEASE: Primary | ICD-10-CM

## 2020-09-01 ENCOUNTER — APPOINTMENT (OUTPATIENT)
Dept: LAB | Facility: CLINIC | Age: 80
End: 2020-09-01
Payer: MEDICARE

## 2020-09-01 DIAGNOSIS — A69.20 LYME DISEASE: ICD-10-CM

## 2020-09-01 PROCEDURE — 36415 COLL VENOUS BLD VENIPUNCTURE: CPT

## 2020-09-01 PROCEDURE — 86617 LYME DISEASE ANTIBODY: CPT

## 2020-10-08 ENCOUNTER — OFFICE VISIT (OUTPATIENT)
Dept: PODIATRY | Facility: CLINIC | Age: 80
End: 2020-10-08
Payer: MEDICARE

## 2020-10-08 VITALS — WEIGHT: 128 LBS | HEIGHT: 61 IN | HEART RATE: 66 BPM | BODY MASS INDEX: 24.17 KG/M2 | RESPIRATION RATE: 17 BRPM

## 2020-10-08 DIAGNOSIS — M21.962 ACQUIRED DEFORMITY OF LEFT FOOT: ICD-10-CM

## 2020-10-08 DIAGNOSIS — M79.672 LEFT FOOT PAIN: ICD-10-CM

## 2020-10-08 DIAGNOSIS — M20.42 HAMMER TOE OF LEFT FOOT: Primary | ICD-10-CM

## 2020-10-08 PROCEDURE — 99213 OFFICE O/P EST LOW 20 MIN: CPT | Performed by: PODIATRIST

## 2020-10-08 PROCEDURE — 73620 X-RAY EXAM OF FOOT: CPT | Performed by: PODIATRIST

## 2020-10-09 ENCOUNTER — TRANSCRIBE ORDERS (OUTPATIENT)
Dept: LAB | Facility: CLINIC | Age: 80
End: 2020-10-09

## 2020-11-09 ENCOUNTER — OFFICE VISIT (OUTPATIENT)
Dept: PODIATRY | Facility: CLINIC | Age: 80
End: 2020-11-09
Payer: MEDICARE

## 2020-11-09 VITALS
BODY MASS INDEX: 25.13 KG/M2 | HEIGHT: 60 IN | RESPIRATION RATE: 16 BRPM | DIASTOLIC BLOOD PRESSURE: 89 MMHG | WEIGHT: 128 LBS | SYSTOLIC BLOOD PRESSURE: 136 MMHG

## 2020-11-09 DIAGNOSIS — M21.962 ACQUIRED DEFORMITY OF LEFT FOOT: ICD-10-CM

## 2020-11-09 DIAGNOSIS — M79.672 LEFT FOOT PAIN: ICD-10-CM

## 2020-11-09 DIAGNOSIS — M20.42 HAMMER TOE OF LEFT FOOT: Primary | ICD-10-CM

## 2020-11-09 PROCEDURE — 99213 OFFICE O/P EST LOW 20 MIN: CPT | Performed by: PODIATRIST

## 2020-12-26 ENCOUNTER — PREP FOR PROCEDURE (OUTPATIENT)
Dept: GASTROENTEROLOGY | Facility: CLINIC | Age: 80
End: 2020-12-26

## 2020-12-26 DIAGNOSIS — Z86.010 HX OF COLONIC POLYPS: Primary | ICD-10-CM

## 2021-01-07 ENCOUNTER — HOSPITAL ENCOUNTER (OUTPATIENT)
Dept: GASTROENTEROLOGY | Facility: AMBULATORY SURGERY CENTER | Age: 81
Discharge: HOME | End: 2021-01-07
Payer: MEDICARE

## 2021-01-07 ENCOUNTER — ANESTHESIA (OUTPATIENT)
Dept: GASTROENTEROLOGY | Facility: AMBULATORY SURGERY CENTER | Age: 81
End: 2021-01-07

## 2021-01-07 ENCOUNTER — ANESTHESIA EVENT (OUTPATIENT)
Dept: GASTROENTEROLOGY | Facility: AMBULATORY SURGERY CENTER | Age: 81
End: 2021-01-07

## 2021-01-07 VITALS
SYSTOLIC BLOOD PRESSURE: 116 MMHG | HEART RATE: 60 BPM | HEIGHT: 60 IN | RESPIRATION RATE: 18 BRPM | BODY MASS INDEX: 21.6 KG/M2 | TEMPERATURE: 96.3 F | OXYGEN SATURATION: 100 % | DIASTOLIC BLOOD PRESSURE: 69 MMHG | WEIGHT: 110 LBS

## 2021-01-07 DIAGNOSIS — Z86.010 HX OF COLONIC POLYPS: ICD-10-CM

## 2021-01-07 PROCEDURE — 45385 COLONOSCOPY W/LESION REMOVAL: CPT | Performed by: INTERNAL MEDICINE

## 2021-01-07 PROCEDURE — 00811 ANES LWR INTST NDSC NOS: CPT | Performed by: NURSE ANESTHETIST, CERTIFIED REGISTERED

## 2021-01-07 PROCEDURE — 88305 TISSUE EXAM BY PATHOLOGIST: CPT | Performed by: PATHOLOGY

## 2021-01-07 PROCEDURE — 45380 COLONOSCOPY AND BIOPSY: CPT | Performed by: INTERNAL MEDICINE

## 2021-01-07 PROCEDURE — 99100 ANES PT EXTEME AGE<1 YR&>70: CPT | Performed by: NURSE ANESTHETIST, CERTIFIED REGISTERED

## 2021-01-07 RX ORDER — MELATONIN
1000 DAILY
COMMUNITY

## 2021-01-07 RX ORDER — PROPOFOL 10 MG/ML
INJECTION, EMULSION INTRAVENOUS AS NEEDED
Status: DISCONTINUED | OUTPATIENT
Start: 2021-01-07 | End: 2021-01-07

## 2021-01-07 RX ORDER — UBIDECARENONE 75 MG
CAPSULE ORAL DAILY
COMMUNITY

## 2021-01-07 RX ORDER — GLYCOPYRROLATE 0.2 MG/ML
INJECTION INTRAMUSCULAR; INTRAVENOUS AS NEEDED
Status: DISCONTINUED | OUTPATIENT
Start: 2021-01-07 | End: 2021-01-07

## 2021-01-07 RX ORDER — POTASSIUM CHLORIDE 750 MG/1
10 CAPSULE, EXTENDED RELEASE ORAL 2 TIMES DAILY
COMMUNITY
End: 2021-09-28 | Stop reason: CLARIF

## 2021-01-07 RX ORDER — SODIUM CHLORIDE 9 MG/ML
INJECTION, SOLUTION INTRAVENOUS CONTINUOUS PRN
Status: DISCONTINUED | OUTPATIENT
Start: 2021-01-07 | End: 2021-01-07

## 2021-01-07 RX ADMIN — PROPOFOL 50 MG: 10 INJECTION, EMULSION INTRAVENOUS at 08:53

## 2021-01-07 RX ADMIN — GLYCOPYRROLATE 0.2 MG: 0.2 INJECTION INTRAMUSCULAR; INTRAVENOUS at 08:50

## 2021-01-07 RX ADMIN — SODIUM CHLORIDE: 9 INJECTION, SOLUTION INTRAVENOUS at 08:41

## 2021-01-07 RX ADMIN — PROPOFOL 50 MG: 10 INJECTION, EMULSION INTRAVENOUS at 08:45

## 2021-01-07 RX ADMIN — PROPOFOL 50 MG: 10 INJECTION, EMULSION INTRAVENOUS at 09:16

## 2021-01-07 RX ADMIN — PROPOFOL 50 MG: 10 INJECTION, EMULSION INTRAVENOUS at 08:58

## 2021-01-07 RX ADMIN — SODIUM CHLORIDE: 9 INJECTION, SOLUTION INTRAVENOUS at 08:53

## 2021-01-07 RX ADMIN — PROPOFOL 50 MG: 10 INJECTION, EMULSION INTRAVENOUS at 08:42

## 2021-01-07 RX ADMIN — PROPOFOL 50 MG: 10 INJECTION, EMULSION INTRAVENOUS at 08:49

## 2021-01-07 RX ADMIN — PROPOFOL 30 MG: 10 INJECTION, EMULSION INTRAVENOUS at 09:07

## 2021-01-07 NOTE — DISCHARGE INSTRUCTIONS
Colorectal Polyps   WHAT YOU NEED TO KNOW:   What are colorectal polyps? Colorectal polyps are small growths of tissue in the lining of the colon and rectum  Most polyps are hyperplastic polyps and are usually benign (noncancerous)  Certain types of polyps, called adenomatous polyps, may turn into cancer  What increases my risk of colorectal polyps? The exact cause of colorectal polyps is unknown  The following may increase your risk:  · Older age    · A diet of foods high in fat and low in fiber     · Family history of polyps    · Intestinal diseases, such as Crohn's disease or ulcerative colitis    · An unhealthy lifestyle, such as physical inactivity, smoking, or drinking alcohol    · Obesity    What are the signs and symptoms of colorectal polyps? · Blood in your bowel movement or bleeding from the rectum    · Change in bowel movement habits, such as diarrhea and constipation    · Abdominal pain    How are colorectal polyps diagnosed? You should have fecal blood screening once a year for colorectal disease if you are over 48years old  You should be screened earlier if you have an intestinal disease or a family history of polyps or colorectal cancer  During this screening, a sample of your bowel movement is checked for blood, which may be an early sign of colorectal polyps or cancer  You may also need any of the following tests:  · Digital rectal exam:  Your healthcare provider will examine your anus and use a finger to check your rectum for polyps  · Barium enema: A barium enema is an x-ray of the colon  A tube is put into your anus, and a liquid called barium is put through the tube  Barium is used so that healthcare providers can see your colon better on the x-ray film  · Virtual colonoscopy: This is a CT scan that takes pictures of the inside of your colon and rectum  A small, flexible tube is put into your rectum and air or carbon dioxide (gas) is used to expand your colon   This lets healthcare providers clearly see your colon and any polyps on a monitor  · Colonoscopy or sigmoidoscopy: These procedures help your healthcare provider see the inside of your colon using a flexible tube with a small light and camera on the end  During a sigmoidoscopy, your healthcare provider will only look at rectum and lower colon  During a colonoscopy, healthcare providers will look at the full length of your colon  Healthcare providers may remove a small amount of tissue from the colon for a biopsy  How are colorectal polyps treated? A polypectomy is a minimally invasive procedure to remove your polyps  They may be removed during a colonoscopy or sigmoidoscopy  Your healthcare provider may need to remove the polyps with a laparoscope  Laparoscopy is done by inserting a small, flexible scope into incisions made on your abdomen  What are the risks of colorectal polyps? You may bleed during a colonoscopy procedure  Your bowel may be perforated (torn) when polyps are removed  This may lead to an open abdominal surgery  During surgery, you may bleed too much or get an infection  Adenomatous polyps that are not removed may turn into cancer and become more difficult to treat  Where can I find support and more information? · [facility] ([facility])  [address], [place] [address]  Phone: [phone]  Web Address: Humberto Gay  Levine, Susan. \[facility] and Outlook.\"" nih gov    When should I contact my healthcare provider? · You have a fever  · You have chills, a cough, or feel weak and achy  · You have abdominal pain that does not go away or gets worse after you take medicine  · Your abdomen is swollen  · You are losing weight without trying  · You have questions or concerns about your condition or care  When should I seek immediate care or call 911? · You have sudden shortness of breath       · You have a fast heart rate, fast breathing, or are too dizzy to stand up  · You have severe abdominal pain  · You see blood in your bowel movement  CARE AGREEMENT:   You have the right to help plan your care  Learn about your health condition and how it may be treated  Discuss treatment options with your healthcare providers to decide what care you want to receive  You always have the right to refuse treatment  The above information is an  only  It is not intended as medical advice for individual conditions or treatments  Talk to your doctor, nurse or pharmacist before following any medical regimen to see if it is safe and effective for you  © Copyright [address] Information is for End User's use only and may not be sold, redistributed or otherwise used for commercial purposes  All illustrations and images included in CareNotes® are the copyrighted property of [employer]  or [facility] Fiber Diet   WHAT YOU NEED TO KNOW:   What is a high-fiber diet? A high-fiber diet includes foods that have a high amount of fiber  Fiber is the part of fruits, vegetables, and grains that is not broken down by your body  Fiber keeps your bowel movements regular  Fiber can also help lower your cholesterol level, control blood sugar in people with diabetes, and relieve constipation  Fiber can also help you control your weight because it helps you feel full faster  Most adults should eat 25 to 35 grams of fiber each day  Talk to your dietitian or healthcare provider about the amount of fiber you need  What foods are good sources of fiber? · Foods with at least 4 grams of fiber per serving:      ? ? to ½ cup of high-fiber cereal (check the nutrition label on the box)    ? ½ cup of blackberries or raspberries    ? 4 dried prunes    ? 1 cooked artichoke    ?  ½ cup of cooked legumes, such as lentils, or red, kidney, and rivera beans    · Foods with 1 to 3 grams of fiber per serving:      ? 1 slice of whole-wheat, pumpernickel, or rye bread    ? ½ cup of cooked brown rice    ? 4 whole-wheat crackers    ? 1 cup of oatmeal    ? ½ cup of cereal with 1 to 3 grams of fiber per serving (check the nutrition label on the box)    ? 1 small piece of fruit, such as an apple, banana, pear, kiwi, or orange    ? 3 dates    ? ½ cup of canned apricots, fruit cocktail, peaches, or pears    ? ½ cup of raw or cooked vegetables, such as carrots, cauliflower, cabbage, spinach, squash, or corn  What are some ways that I can increase fiber in my diet? · Choose brown or wild rice instead of white rice  · Use whole wheat flour in recipes instead of white or all-purpose flour  · Add beans and peas to casseroles or soups  · Choose fresh fruit and vegetables with peels or skins on instead of juices  What other guidelines should I follow? · Add fiber to your diet slowly  You may have abdominal discomfort, bloating, and gas if you add fiber to your diet too quickly  · Drink plenty of liquids as you add fiber to your diet  You may have nausea or develop constipation if you do not drink enough water  Ask how much liquid to drink each day and which liquids are best for you  CARE AGREEMENT:   You have the right to help plan your care  Discuss treatment options with your healthcare provider to decide what care you want to receive  You always have the right to refuse treatment  The above information is an  only  It is not intended as medical advice for individual conditions or treatments  Talk to your doctor, nurse or pharmacist before following any medical regimen to see if it is safe and effective for you  © Copyright [address] Information is for End User's use only and may not be sold, redistributed or otherwise used for commercial purposes  All illustrations and images included in CareNotes® are the copyrighted property of [employer]  or [address]  Diverticulosis   WHAT YOU NEED TO KNOW:   What is diverticulosis? Diverticulosis is a condition that causes small pockets called diverticula to form in your intestine  These pockets make it difficult for bowel movements to pass through your digestive system  What causes diverticulosis? Diverticula form when muscles have to work hard to move bowel movements through the intestine  The force causes bulges to form at weak areas in the intestine  This may happen if you eat foods that are low in fiber  Fiber helps give your bowel movements more bulk so they are larger and easier to move through your colon  The following may increase your risk of diverticulosis:  · A history of constipation    · Age 36 or older    · Obesity    · Lack of exercise    What are the signs and symptoms of diverticulosis? Diverticulosis usually does not cause any signs or symptoms  It may cause any of the following in some people:  · Pain or discomfort in your lower abdomen    · Abdominal bloating    · Constipation or diarrhea    How is diverticulosis diagnosed? Your healthcare provider will examine you and ask about your bowel movements, diet, and symptoms  He or she will also ask about any medical conditions you have or medicines you take  You may need any of the following:  · Blood tests  may be done to check for signs of inflammation  · A barium enema  is an x-ray of your colon that may show diverticula  A tube is put into your anus, and a liquid called barium is put through the tube  Barium is used so that healthcare providers can see your colon more clearly  · Flexible sigmoidoscopy  is a test to look for any changes in your lower intestines and rectum  It may also show the cause of any bleeding or pain  A soft, bendable tube with a light on the end will be put into your anus  It will then be moved forward into your intestine  · A colonoscopy  is used to look at your whole colon  A scope (long bendable tube with a light on the end) is used to take pictures  This test may show diverticula  · A CT scan , or CAT scan, may show diverticula  You may be given contrast liquid before the scan  Tell the healthcare provider if you have ever had an allergic reaction to contrast liquid  How is diverticulosis managed? The goal of treatment is to manage any symptoms you have and prevent other problems such as diverticulitis  Diverticulitis is swelling or infection of the diverticula  Your healthcare provider may recommend any of the following:  · Eat a variety of high-fiber foods  High-fiber foods help you have regular bowel movements  High-fiber foods include cooked beans, fruits, vegetables, and some cereals  Most adults need 25 to 35 grams of fiber each day  Your healthcare provider may recommend that you have more  Ask your healthcare provider how much fiber you need  Increase fiber slowly  You may have abdominal discomfort, bloating, and gas if you add fiber to your diet too quickly  You may need to take a fiber supplement if you are not getting enough fiber from food  · Medicines  to soften your bowel movements may be given  You may also need medicines to treat symptoms such as bloating and pain  · Drink liquids as directed  You may need to drink 2 to 3 liters (8 to 12 cups) of liquids every day  Ask your healthcare provider how much liquid to drink each day and which liquids are best for you  · Apply heat  on your abdomen for 20 to 30 minutes every 2 hours for as many days as directed  Heat helps decrease pain and muscle spasms  How can I help prevent diverticulitis or other symptoms? The following may help decrease your risk for diverticulitis or symptoms, such as bleeding  Talk to your provider about these or other things you can do to prevent problems that may occur with diverticulosis  · Exercise regularly  Ask your healthcare provider about the best exercise plan for you  Exercise can help you have regular bowel movements   Get 30 minutes of exercise on most days of the week      · Maintain a healthy weight  Ask your healthcare provider how much you should weigh  Ask him or her to help you create a weight loss plan if you are overweight  · Do not smoke  Nicotine and other chemicals in cigarettes increase your risk for diverticulitis  Ask your healthcare provider for information if you currently smoke and need help to quit  E-cigarettes or smokeless tobacco still contain nicotine  Talk to your healthcare provider before you use these products  · Ask your healthcare provider if it is safe to take NSAIDs  NSAIDs may increase your risk of diverticulitis  When should I seek immediate care? · You have severe pain on the left side of your lower abdomen  · Your bowel movements are bright or dark red  When should I contact my healthcare provider? · You have a fever and chills  · You feel dizzy or lightheaded  · You have nausea, or you are vomiting  · You have a change in your bowel movements  · You have questions or concerns about your condition or care  CARE AGREEMENT:   You have the right to help plan your care  Learn about your health condition and how it may be treated  Discuss treatment options with your healthcare providers to decide what care you want to receive  You always have the right to refuse treatment  The above information is an  only  It is not intended as medical advice for individual conditions or treatments  Talk to your doctor, nurse or pharmacist before following any medical regimen to see if it is safe and effective for you  © Copyright [address] Information is for End User's use only and may not be sold, redistributed or otherwise used for commercial purposes   All illustrations and images included in CareNotes® are the copyrighted property of [employer]  or [address] [place] [address]

## 2021-01-07 NOTE — H&P
H&P EXAM - Outpatient Endoscopy   Shirley Ryder 68 y o  female MRN: [id number]    [facility] ENDOSCOPY [facility]   Encounter: [id number]        Impression:  History of colon polyps for surveillance colonoscopy    Plan:  Colonoscopy under conscious sedation    Chief Complaint:  Generalized abdominal pain, history of colon polyps    Physical Exam:  Head and neck examination-no icterus, no pallor, no cervical lymphadenopathy   Chest:  Bilateral good air entry   Heart:  S1, S2 regular

## 2021-01-07 NOTE — ANESTHESIA POSTPROCEDURE EVALUATION
Post-Op Assessment Note    CV Status:  Stable  Pain Score: 0    Pain management: adequate     Mental Status:  Sleepy   Hydration Status:  Stable   PONV Controlled:  None   Airway Patency:  Patent      Post Op Vitals Reviewed: Yes      Staff: CRNA         No complications documented      BP     Temp     Pulse     Resp      SpO2

## 2021-01-07 NOTE — ANESTHESIA PREPROCEDURE EVALUATION
Procedure:  COLONOSCOPY    Relevant Problems   ANESTHESIA (within normal limits)        Physical Exam    Airway    Mallampati score: II  TM Distance: >3 FB  Neck ROM: full     Dental       Cardiovascular  Rhythm: regular, Rate: normal,     Pulmonary  Breath sounds clear to auscultation,     Other Findings        Anesthesia Plan  ASA Score- 2     Anesthesia Type- IV sedation with anesthesia with ASA Monitors  Additional Monitors:   Airway Plan:           Plan Factors-Exercise tolerance (METS): >4 METS  Chart reviewed  Patient is not a current smoker  Induction- intravenous  Postoperative Plan-     Informed Consent- Anesthetic plan and risks discussed with patient

## 2021-01-12 ENCOUNTER — TELEPHONE (OUTPATIENT)
Dept: GASTROENTEROLOGY | Facility: CLINIC | Age: 81
End: 2021-01-12

## 2021-01-20 ENCOUNTER — OFFICE VISIT (OUTPATIENT)
Dept: GASTROENTEROLOGY | Facility: CLINIC | Age: 81
End: 2021-01-20
Payer: MEDICARE

## 2021-01-20 VITALS
DIASTOLIC BLOOD PRESSURE: 97 MMHG | BODY MASS INDEX: 22.47 KG/M2 | HEIGHT: 61 IN | HEART RATE: 79 BPM | WEIGHT: 119 LBS | SYSTOLIC BLOOD PRESSURE: 190 MMHG

## 2021-01-20 DIAGNOSIS — K57.90 DIVERTICULOSIS: ICD-10-CM

## 2021-01-20 DIAGNOSIS — K62.1 DYSPLASTIC RECTAL POLYP: Primary | ICD-10-CM

## 2021-01-20 DIAGNOSIS — I10 ESSENTIAL HYPERTENSION: ICD-10-CM

## 2021-01-20 PROCEDURE — 99214 OFFICE O/P EST MOD 30 MIN: CPT | Performed by: INTERNAL MEDICINE

## 2021-01-20 NOTE — PATIENT INSTRUCTIONS
Flexible Sigmoidoscopy   WHAT YOU NEED TO KNOW:   A flexible sigmoidoscopy is a procedure to look inside your rectum and sigmoid colon  The sigmoid colon is the lower part of your intestines, closest to your rectum  Healthcare providers insert a sigmoidoscope into your rectum  This is a soft, bendable tube with a light and tiny camera on the end  Pictures of your colon appear on a monitor during the procedure  A flexible sigmoidoscopy may help diagnose colon diseases, inflammation, polyps (growths), or infections  DISCHARGE INSTRUCTIONS:   Medicines:   · Pain medicine: You may be given medicine to take away or decrease pain  Do not wait until the pain is severe before you take your medicine  · Bowel movement softeners: This medicine makes it easier for you to have a bowel movement  You may need this medicine to prevent constipation  · Take your medicine as directed  Contact your healthcare provider if you think your medicine is not helping or if you have side effects  Tell him of her if you are allergic to any medicine  Keep a list of the medicines, vitamins, and herbs you take  Include the amounts, and when and why you take them  Bring the list or the pill bottles to follow-up visits  Carry your medicine list with you in case of an emergency  Follow up with your healthcare provider as directed: Ask when you should expect the results from your procedure  Write down your questions so you remember to ask them during your visits  Prevent constipation:   · Eat a variety of healthy foods:  Healthy foods include fruits, vegetables, whole-grain breads, low-fat dairy products, beans, lean meats, and fish  · Drink liquids as directed:  Ask your healthcare provider how much liquid to drink each day and which liquids are best for you  · Exercise:  Ask your healthcare provider about the best exercise plan for you  Contact your healthcare provider if:   · You have a fever      · You feel full or bloated  · Your signs and symptoms get worse  · You have nausea or vomiting  · You have questions or concerns about your condition or care  Return to the emergency department if:   · You are not able to have a bowel movement  · You have blood in your bowel movement  · Your vomit has blood or bile (yellow or green fluid) in it  · Your abdomen becomes tender and hard  © Copyright [employer] 2018 Information is for End User's use only and may not be sold, redistributed or otherwise used for commercial purposes  All illustrations and images included in CareNotes® are the copyrighted property of [employer] [facility] [employer]  or [address] [place] [address]  The above information is an  only  It is not intended as medical advice for individual conditions or treatments  Talk to your doctor, nurse or pharmacist before following any medical regimen to see if it is safe and effective for you

## 2021-01-20 NOTE — PROGRESS NOTES
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, she will need repeat flexible sigmoidoscopy in 6 month to rule out any recurrence  There was another small polyp in the cecum which was removed but biopsy came back negative for any adenoma or malignancy  - Flexible Sigmoidoscopy; Future    2  Diverticulosis  Advised for high-fiber diet, she denying any chronic constipation    3  Essential hypertension  Her blood pressure is high today, she denying any history of hypertension, advised her to monitor blood pressure at home and if blood pressure remains high then advised to follow with primary doctor    ______________________________________________________________________    SUBJECTIVE:  Patient seen and examined, she is status post colonoscopy, she had recent screening colonoscopy which shows large 4 cm size rectal polyp which was removed with hot snare polypectomy, post polypectomy she had a small amount of bleeding which is resolved without any intervention, she currently denying any rectal pain or bleeding, she denying any constipation or change in stool color    REVIEW OF SYSTEMS IS OTHERWISE NEGATIVE        Historical Information   Past Medical History:   Diagnosis Date    Acid reflux     Aneurysm ([address] )     is in a cavernous area with an meningioma-followed by MRI (left side)    Aneurysm (HCC)     brain    Arthritis     fingers    Bite from insect     had several wasp bites on each arm  7/11/`6    Colon polyp     Iron disorder     high level- 199, gave i unit of blood, level now may be lower    Lyme disease      Past Surgical History:   Procedure Laterality Date    ADENOIDECTOMY      ADRENALECTOMY Left 2010    ADRENALECTOMY Left 2010    APPENDECTOMY      CATARACT EXTRACTION      COLONOSCOPY      CYSTOSCOPY      ESOPHAGOGASTRODUODENOSCOPY  12/2015    FOOT SURGERY Bilateral     bunionectomy, neuroma removed ezequiel , hammertoe ezequiel   [place] EGD TRANSORAL BIOPSY SINGLE/MULTIPLE N/A 8/9/2017    Procedure: ESOPHAGOGASTRODUODENOSCOPY (EGD); Surgeon: Althea Wagner MD;  Location: [facility]; Service: Gastroenterology    FL XCAPSL CTRC RMVL INSJ IO LENS PROSTH W/O ECP Left 7/21/2016    Procedure: EXTRACTION EXTRACAPSULAR CATARACT PHACO INTRAOCULAR LENS (IOL); Surgeon: Hadley Crespo MD;  Location: [facility] MAIN OR;  Service: Ophthalmology    FL XCAPSL CTRC RMVL INSJ IO LENS PROSTH W/O ECP Right 6/23/2016    Procedure: EXTRACTION EXTRACAPSULAR CATARACT PHACO INTRAOCULAR LENS (IOL);   Surgeon: Hadley Crespo MD;  Location: [facility] MAIN OR;  Service: Ophthalmology    TONSILLECTOMY AND ADENOIDECTOMY      TUBAL LIGATION      UPPER GASTROINTESTINAL ENDOSCOPY       Social History   Social History     Substance and Sexual Activity   Alcohol Use Never    Frequency: Never     Social History     Substance and Sexual Activity   Drug Use No     Social History     Tobacco Use   Smoking Status Never Smoker   Smokeless Tobacco Never Used     Family History   Problem Relation Age of Onset    COPD Mother         age 80    Hypertension Mother     Transient ischemic attack Father     Hypertension Father     Cancer Sister 70        breast    Heart disease Sister         silent MI from chemo       Meds/Allergies       Current Outpatient Medications:     cholecalciferol (VITAMIN D3) 1,000 units tablet    cyanocobalamin (VITAMIN B-12) 100 mcg tablet    MAGNESIUM PO    potassium chloride (MICRO-K) 10 MEQ CR capsule    Allergies   Allergen Reactions    Latex      Burning to vaginal area with GYN exam    Penicillins Hives    Sulfa Antibiotics Hives           Objective Blood pressure (!) 190/97, pulse 79, height 5' 0 5" (1 537 m), weight 54 kg (119 lb), not currently breastfeeding  Body mass index is 22 86 kg/m²  PHYSICAL EXAM:      General Appearance:   Alert, cooperative, no distress   HEENT:   Normocephalic, atraumatic, anicteric      Neck:  Supple, symmetrical, trachea midline   Lungs:   Clear to auscultation bilaterally; no rales, rhonchi or wheezing; respirations unlabored    Heart[de-identified]   Regular rate and rhythm; no murmur, rub, or gallop  Abdomen:   Soft, non-tender, non-distended; normal bowel sounds; no masses, no organomegaly    Genitalia:   Deferred    Rectal:   Deferred    Extremities:  No cyanosis, clubbing or edema    Pulses:  2+ and symmetric    Skin:  No jaundice, rashes, or lesions    Lymph nodes:  No palpable cervical lymphadenopathy        Lab Results:   No visits with results within 1 Day(s) from this visit  Latest known visit with results is:   Hospital Outpatient Visit on 01/07/2021   Component Date Value    Case Report 01/07/2021                      Value:Surgical Pathology Report                         Case: [id number]                                   Authorizing Provider:  Martin Pillai MD           Collected:           01/07/2021 0905              Ordering Location:     [address] [place] [address] Received:            01/08/2021 [address]                                                                  Pathologist:           Liliana Youssef MD                                                                 Specimens:   A) - Rectum, hot/cold snare distal rectum large polyp                                               B) - Large Intestine, Cecum, cold bx cecum polyp                                           Final Diagnosis 01/07/2021                      Value: This result contains rich text formatting which cannot be displayed here   Additional Information 01/07/2021                      Value: This result contains rich text formatting which cannot be displayed here   Synoptic Checklist 01/07/2021                      Value:  (COLON/RECTUM POLYP FORM - GI - All Specimens)                                                                                                                 : Adenoma(s)     [facility] Description 01/07/2021                      Value: This result contains rich text formatting which cannot be displayed here  Radiology Results:   No results found

## 2021-02-12 DIAGNOSIS — Z23 ENCOUNTER FOR IMMUNIZATION: ICD-10-CM

## 2021-03-29 ENCOUNTER — OFFICE VISIT (OUTPATIENT)
Dept: PODIATRY | Facility: CLINIC | Age: 81
End: 2021-03-29
Payer: MEDICARE

## 2021-03-29 VITALS — HEIGHT: 61 IN | RESPIRATION RATE: 17 BRPM | WEIGHT: 119 LBS | BODY MASS INDEX: 22.47 KG/M2

## 2021-03-29 DIAGNOSIS — M21.962 ACQUIRED DEFORMITY OF LEFT FOOT: ICD-10-CM

## 2021-03-29 DIAGNOSIS — M79.672 PAIN IN BOTH FEET: ICD-10-CM

## 2021-03-29 DIAGNOSIS — M79.672 LEFT FOOT PAIN: Primary | ICD-10-CM

## 2021-03-29 DIAGNOSIS — M79.671 PAIN IN BOTH FEET: ICD-10-CM

## 2021-03-29 DIAGNOSIS — M89.8X9 BONY EXOSTOSIS: ICD-10-CM

## 2021-03-29 DIAGNOSIS — M20.41 HAMMER TOES OF BOTH FEET: ICD-10-CM

## 2021-03-29 DIAGNOSIS — M20.42 HAMMER TOES OF BOTH FEET: ICD-10-CM

## 2021-03-29 PROCEDURE — 99213 OFFICE O/P EST LOW 20 MIN: CPT | Performed by: PODIATRIST

## 2021-03-29 NOTE — PROGRESS NOTES
Assessment/Plan:  Pain upon ambulation   Deformity of toe   Hammertoe formation   Secondary soft corn of 3rd left interdigital space  Soft corn 2nd right toe      Plan   Foot exam performed   Patient educated on condition   X-rays   Reviewed   Lesions debrided   Patient may need ostectomy  Patient would like to have procedures performed  We would do ostectomy of 2nd right toe as well as ostectomy 4th left toe          Diagnoses and all orders for this visit:     Hammer toe of left foot     Acquired deformity of left foot     Left foot pain    Hammertoe 2nd right  Secondary corn PIPJ            Subjective:  Patient has complaint of pain in the toes of her left  And rightfoot   She has pain upon ambulation   It has been ongoing for several weeks   No history of trauma  Patient has history of prior bunionectomies  Over time she has had increasing pain due to corns                Allergies   Allergen Reactions    Latex         Burning to vaginal area with GYN exam    Penicillins Hives    Sulfa Antibiotics Hives            Current Outpatient Medications:     Cholecalciferol (VITAMIN D3 PO), Take by mouth every morning   Liquid 2000 units, Disp: , Rfl:     co-enzyme Q-10 30 MG capsule, Take 30 mg by mouth daily, Disp: , Rfl:     doxycycline hyclate (VIBRA-TABS) 100 mg tablet, Take 100 mg by mouth 2 (two) times a day, Disp: , Rfl:     ibuprofen (MOTRIN) 200 mg tablet, Take by mouth 4 (four) times a day, Disp: , Rfl:     MAGNESIUM PO, Take 50 mg by mouth every morning , Disp: , Rfl:     Multiple Vitamins-Minerals (HAIR SKIN AND NAILS FORMULA PO), Take by mouth daily, Disp: , Rfl:     Omega-3 Fatty Acids (FISH OIL CONCENTRATE PO), Take by mouth daily, Disp: , Rfl:     pantoprazole (PROTONIX) 40 mg tablet, Take 1 tablet (40 mg total) by mouth daily, Disp: 30 tablet, Rfl: 0           Patient Active Problem List   Diagnosis    Cataract             Patient ID: Barbara VERMA Josh is a 80 y o  female      HPI     The following portions of the patient's history were reviewed and updated as appropriate:      family history includes COPD in her mother; Cancer (age of onset: 70) in her sister; Heart disease in her sister; Hypertension in her father and mother; Transient ischemic attack in her father        reports that she has never smoked  She has never used smokeless tobacco  She reports that she does not drink alcohol or use drugs         Objective:  Patient's shoes and socks removed    Foot Exam     General  General Appearance: appears stated age and healthy   Orientation: alert and oriented to person, place, and time   Affect: appropriate   Gait: antalgic         Right Foot/Ankle      Inspection and Palpation  Ecchymosis: none  Swelling: dorsum   Arch: pes planus  Hallux valgus: yes  Hallux limitus: yes  Skin Exam: callus and dry skin;      Neurovascular  Dorsalis pedis: 2+  Posterior tibial: 2+  Saphenous nerve sensation: normal  Tibial nerve sensation: normal  Superficial peroneal nerve sensation: normal  Deep peroneal nerve sensation: normal  Sural nerve sensation: normal        Left Foot/Ankle       Inspection and Palpation  Ecchymosis: none  Tenderness: bony tenderness   Swelling: dorsum   Arch: pes planus  Hammertoes: second toe, fifth toe, fourth toe and third toe  Hallux valgus: yes  Hallux limitus: yes  Skin Exam: callus and dry skin;      Neurovascular  Dorsalis pedis: 2+  Posterior tibial: 2+  Saphenous nerve sensation: normal  Tibial nerve sensation: normal  Superficial peroneal nerve sensation: normal  Deep peroneal nerve sensation: normal  Sural nerve sensation: normal           Physical Exam  Vitals signs reviewed  Cardiovascular:      Rate and Rhythm: Normal rate and regular rhythm       Pulses:           Dorsalis pedis pulses are 2+ on the right side and 2+ on the left side         Posterior tibial pulses are 2+ on the right side and 2+ on the left side     Musculoskeletal:    Right foot: Bunion present       Left foot: Bony tenderness and bunion present       Comments: Toes are deviating contracted   Left foot demonstrates hammertoe formation  X-ray   Left foot demonstrates enlarged tubercles of the middle phalanx of the 3rd and 4th toe respectively    Feet:      Right foot:      Skin integrity: Callus and dry skin present       Left foot:      Skin integrity: Callus and dry skin present  Skin:     Comments: Patient has soft corn of the 3rd left interdigital space   It is at the level of the PIPJ    Neurological:      Mental Status: She is alert  Psychiatric:         Mood and Affect: Mood normal          [place]         Thought Content:  Thought content normal          Judgment: Judgment normal

## 2021-04-08 ENCOUNTER — TRANSCRIBE ORDERS (OUTPATIENT)
Dept: LAB | Facility: CLINIC | Age: 81
End: 2021-04-08

## 2021-04-08 ENCOUNTER — APPOINTMENT (OUTPATIENT)
Dept: LAB | Facility: CLINIC | Age: 81
End: 2021-04-08
Payer: MEDICARE

## 2021-04-08 DIAGNOSIS — E78.9 DISORDER OF LIPOPROTEIN AND LIPID METABOLISM: ICD-10-CM

## 2021-04-08 DIAGNOSIS — R73.03 DIABETES MELLITUS, LATENT: ICD-10-CM

## 2021-04-08 DIAGNOSIS — E63.0 ESSENTIAL FATTY ACID DEFICIENCY: ICD-10-CM

## 2021-04-08 DIAGNOSIS — R97.0 ELEVATED CARCINOEMBRYONIC ANTIGEN (CEA): ICD-10-CM

## 2021-04-08 DIAGNOSIS — A69.20 LYME DISEASE: ICD-10-CM

## 2021-04-08 DIAGNOSIS — E83.40 DISORDER OF MAGNESIUM METABOLISM: ICD-10-CM

## 2021-04-08 DIAGNOSIS — E03.9 MYXEDEMA HEART DISEASE: ICD-10-CM

## 2021-04-08 DIAGNOSIS — Z20.828 EXPOSURE TO SARS-ASSOCIATED CORONAVIRUS: ICD-10-CM

## 2021-04-08 DIAGNOSIS — T56.94XD TOXIC EFFECT OF METAL, UNDETERMINED INTENT, SUBSEQUENT ENCOUNTER: Primary | ICD-10-CM

## 2021-04-08 DIAGNOSIS — E53.8 BIOTIN-(PROPIONYL-COA-CARBOXYLASE) LIGASE DEFICIENCY: ICD-10-CM

## 2021-04-08 DIAGNOSIS — D64.9 ANEMIA, UNSPECIFIED TYPE: ICD-10-CM

## 2021-04-08 DIAGNOSIS — I51.9 MYXEDEMA HEART DISEASE: ICD-10-CM

## 2021-04-08 DIAGNOSIS — E66.8 OBESITY OF ENDOCRINE ORIGIN: ICD-10-CM

## 2021-04-08 LAB
ALBUMIN SERPL BCP-MCNC: 3.9 G/DL (ref 3.5–5)
ALP SERPL-CCNC: 83 U/L (ref 46–116)
ALT SERPL W P-5'-P-CCNC: 26 U/L (ref 12–78)
ANION GAP SERPL CALCULATED.3IONS-SCNC: 5 MMOL/L (ref 4–13)
AST SERPL W P-5'-P-CCNC: 19 U/L (ref 5–45)
BASOPHILS # BLD AUTO: 0.05 THOUSANDS/ΜL (ref 0–0.1)
BASOPHILS NFR BLD AUTO: 1 % (ref 0–1)
BILIRUB SERPL-MCNC: 0.5 MG/DL (ref 0.2–1)
BUN SERPL-MCNC: 16 MG/DL (ref 5–25)
CALCIUM SERPL-MCNC: 9.3 MG/DL (ref 8.3–10.1)
CEA SERPL-MCNC: <0.5 NG/ML (ref 0–3)
CHLORIDE SERPL-SCNC: 107 MMOL/L (ref 100–108)
CO2 SERPL-SCNC: 28 MMOL/L (ref 21–32)
CORTIS SERPL-MCNC: 22.2 UG/DL
CREAT SERPL-MCNC: 0.77 MG/DL (ref 0.6–1.3)
CRP SERPL QL: <3 MG/L
EOSINOPHIL # BLD AUTO: 0.09 THOUSAND/ΜL (ref 0–0.61)
EOSINOPHIL NFR BLD AUTO: 2 % (ref 0–6)
ERYTHROCYTE [DISTWIDTH] IN BLOOD BY AUTOMATED COUNT: 13.3 % (ref 11.6–15.1)
EST. AVERAGE GLUCOSE BLD GHB EST-MCNC: 120 MG/DL
FERRITIN SERPL-MCNC: 80 NG/ML (ref 8–388)
GFR SERPL CREATININE-BSD FRML MDRD: 74 ML/MIN/1.73SQ M
GLUCOSE P FAST SERPL-MCNC: 87 MG/DL (ref 65–99)
HBA1C MFR BLD: 5.8 %
HCT VFR BLD AUTO: 44.9 % (ref 34.8–46.1)
HCYS SERPL-SCNC: 7 UMOL/L (ref 3.7–11.2)
HGB BLD-MCNC: 14.3 G/DL (ref 11.5–15.4)
IMM GRANULOCYTES # BLD AUTO: 0.01 THOUSAND/UL (ref 0–0.2)
IMM GRANULOCYTES NFR BLD AUTO: 0 % (ref 0–2)
IRON SERPL-MCNC: 86 UG/DL (ref 50–170)
LYMPHOCYTES # BLD AUTO: 2.41 THOUSANDS/ΜL (ref 0.6–4.47)
LYMPHOCYTES NFR BLD AUTO: 41 % (ref 14–44)
MAGNESIUM SERPL-MCNC: 2.7 MG/DL (ref 1.6–2.6)
MCH RBC QN AUTO: 29.4 PG (ref 26.8–34.3)
MCHC RBC AUTO-ENTMCNC: 31.8 G/DL (ref 31.4–37.4)
MCV RBC AUTO: 92 FL (ref 82–98)
MONOCYTES # BLD AUTO: 0.46 THOUSAND/ΜL (ref 0.17–1.22)
MONOCYTES NFR BLD AUTO: 8 % (ref 4–12)
NEUTROPHILS # BLD AUTO: 2.87 THOUSANDS/ΜL (ref 1.85–7.62)
NEUTS SEG NFR BLD AUTO: 48 % (ref 43–75)
NRBC BLD AUTO-RTO: 0 /100 WBCS
PLATELET # BLD AUTO: 247 THOUSANDS/UL (ref 149–390)
PMV BLD AUTO: 11.3 FL (ref 8.9–12.7)
POTASSIUM SERPL-SCNC: 3.7 MMOL/L (ref 3.5–5.3)
PROT SERPL-MCNC: 7.3 G/DL (ref 6.4–8.2)
RBC # BLD AUTO: 4.86 MILLION/UL (ref 3.81–5.12)
SARS-COV-2 IGG+IGM SERPL QL IA: NORMAL
SODIUM SERPL-SCNC: 140 MMOL/L (ref 136–145)
T3FREE SERPL-MCNC: 3.05 PG/ML (ref 2.3–4.2)
T4 FREE SERPL-MCNC: 0.82 NG/DL (ref 0.76–1.46)
TSH SERPL DL<=0.05 MIU/L-ACNC: 1.8 UIU/ML (ref 0.36–3.74)
VIT B12 SERPL-MCNC: 1165 PG/ML (ref 100–900)
WBC # BLD AUTO: 5.89 THOUSAND/UL (ref 4.31–10.16)

## 2021-04-08 PROCEDURE — 86769 SARS-COV-2 COVID-19 ANTIBODY: CPT

## 2021-04-08 PROCEDURE — 83540 ASSAY OF IRON: CPT

## 2021-04-08 PROCEDURE — 84481 FREE ASSAY (FT-3): CPT

## 2021-04-08 PROCEDURE — 83735 ASSAY OF MAGNESIUM: CPT

## 2021-04-08 PROCEDURE — 86140 C-REACTIVE PROTEIN: CPT

## 2021-04-08 PROCEDURE — 83090 ASSAY OF HOMOCYSTEINE: CPT

## 2021-04-08 PROCEDURE — 82533 TOTAL CORTISOL: CPT

## 2021-04-08 PROCEDURE — 82542 COL CHROMOTOGRAPHY QUAL/QUAN: CPT

## 2021-04-08 PROCEDURE — 84443 ASSAY THYROID STIM HORMONE: CPT

## 2021-04-08 PROCEDURE — 82728 ASSAY OF FERRITIN: CPT

## 2021-04-08 PROCEDURE — 82378 CARCINOEMBRYONIC ANTIGEN: CPT

## 2021-04-08 PROCEDURE — 85025 COMPLETE CBC W/AUTO DIFF WBC: CPT

## 2021-04-08 PROCEDURE — 86618 LYME DISEASE ANTIBODY: CPT

## 2021-04-08 PROCEDURE — 82607 VITAMIN B-12: CPT

## 2021-04-08 PROCEDURE — 80053 COMPREHEN METABOLIC PANEL: CPT

## 2021-04-08 PROCEDURE — 83825 ASSAY OF MERCURY: CPT

## 2021-04-08 PROCEDURE — 84439 ASSAY OF FREE THYROXINE: CPT

## 2021-04-08 PROCEDURE — 82627 DEHYDROEPIANDROSTERONE: CPT

## 2021-04-08 PROCEDURE — 83036 HEMOGLOBIN GLYCOSYLATED A1C: CPT

## 2021-04-08 PROCEDURE — 82175 ASSAY OF ARSENIC: CPT

## 2021-04-08 PROCEDURE — 83655 ASSAY OF LEAD: CPT

## 2021-04-08 PROCEDURE — 36415 COLL VENOUS BLD VENIPUNCTURE: CPT

## 2021-04-09 LAB
B BURGDOR IGG+IGM SER-ACNC: 18
DHEA-S SERPL-MCNC: 136 UG/DL (ref 13.9–142.8)

## 2021-04-20 LAB
MISCELLANEOUS LAB TEST RESULT: NORMAL
MISCELLANEOUS LAB TEST RESULT: NORMAL

## 2021-04-21 LAB — MISCELLANEOUS LAB TEST RESULT: NORMAL

## 2021-04-27 ENCOUNTER — TELEPHONE (OUTPATIENT)
Dept: GASTROENTEROLOGY | Facility: CLINIC | Age: 81
End: 2021-04-27

## 2021-04-27 ENCOUNTER — TELEPHONE (OUTPATIENT)
Dept: LAB | Facility: HOSPITAL | Age: 81
End: 2021-04-27

## 2021-04-27 ENCOUNTER — TRANSCRIBE ORDERS (OUTPATIENT)
Dept: LAB | Facility: HOSPITAL | Age: 81
End: 2021-04-27

## 2021-04-27 DIAGNOSIS — R10.13 EPIGASTRIC PAIN: Primary | ICD-10-CM

## 2021-04-27 DIAGNOSIS — A69.20 LYME DISEASE: Primary | ICD-10-CM

## 2021-04-27 DIAGNOSIS — E63.0 ESSENTIAL FATTY ACID DEFICIENCY: ICD-10-CM

## 2021-04-27 DIAGNOSIS — R73.03 DIABETES MELLITUS, LATENT: ICD-10-CM

## 2021-04-27 DIAGNOSIS — T56.94XD TOXIC EFFECT OF METAL, UNDETERMINED INTENT, SUBSEQUENT ENCOUNTER: ICD-10-CM

## 2021-04-27 NOTE — TELEPHONE ENCOUNTER
Patient called stating she was told to call if she was still having pain after US not showing much of anything  You did mention to her that you would order a CT Scan  She is asking if you could order the CT Scan for her  Please advise, thank you!

## 2021-04-27 NOTE — TELEPHONE ENCOUNTER
Called and informed patient  She will be stopping by the [place] office to  the order to get done at [address]

## 2021-04-28 ENCOUNTER — APPOINTMENT (OUTPATIENT)
Dept: LAB | Facility: CLINIC | Age: 81
End: 2021-04-28
Payer: MEDICARE

## 2021-04-28 DIAGNOSIS — A69.20 LYME DISEASE: ICD-10-CM

## 2021-04-28 DIAGNOSIS — R73.03 DIABETES MELLITUS, LATENT: ICD-10-CM

## 2021-04-28 DIAGNOSIS — E63.0 ESSENTIAL FATTY ACID DEFICIENCY: ICD-10-CM

## 2021-04-28 DIAGNOSIS — T56.94XD TOXIC EFFECT OF METAL, UNDETERMINED INTENT, SUBSEQUENT ENCOUNTER: ICD-10-CM

## 2021-04-28 PROCEDURE — 86617 LYME DISEASE ANTIBODY: CPT

## 2021-04-28 PROCEDURE — 36415 COLL VENOUS BLD VENIPUNCTURE: CPT

## 2021-05-04 ENCOUNTER — TELEPHONE (OUTPATIENT)
Dept: RADIOLOGY | Facility: HOSPITAL | Age: 81
End: 2021-05-04

## 2021-05-04 DIAGNOSIS — R10.13 EPIGASTRIC PAIN: Primary | ICD-10-CM

## 2021-05-04 NOTE — TELEPHONE ENCOUNTER
Spoke to patient, due to previous reaction she would prefer not to have IV contrast, she is aware this not be the most optimal study  Okay with Dr Moy Sanchez to change CT order to PO contrast only  Order changed and central scheduling made aware

## 2021-05-05 ENCOUNTER — HOSPITAL ENCOUNTER (OUTPATIENT)
Dept: RADIOLOGY | Facility: HOSPITAL | Age: 81
Discharge: HOME | End: 2021-05-05
Payer: MEDICARE

## 2021-05-05 DIAGNOSIS — R10.13 EPIGASTRIC PAIN: ICD-10-CM

## 2021-05-05 PROCEDURE — G1004 CDSM NDSC: HCPCS

## 2021-05-05 PROCEDURE — 74176 CT ABD & PELVIS W/O CONTRAST: CPT

## 2021-05-10 ENCOUNTER — TELEPHONE (OUTPATIENT)
Dept: OTHER | Facility: HOSPITAL | Age: 81
End: 2021-05-10

## 2021-05-10 NOTE — TELEPHONE ENCOUNTER
Patient the recent CT abdomen pelvis results, I recommended she follow with PCP due to compression fracture for possible DEXA scan to evaluate bone density  She would like a hard copy of CT abdomen pelvis, as she can see it on her my chart but is having problems with her printer  Told patient she can  copy on Wednesday at our [place] office  She would like to discuss having EGD with flexible sigmoidoscopy this summer, will send a message to scheduling to make follow-up visit with Dr Shanda Celis

## 2021-05-24 ENCOUNTER — OFFICE VISIT (OUTPATIENT)
Dept: URGENT CARE | Facility: CLINIC | Age: 81
End: 2021-05-24
Payer: MEDICARE

## 2021-05-24 ENCOUNTER — APPOINTMENT (EMERGENCY)
Dept: RADIOLOGY | Facility: HOSPITAL | Age: 81
End: 2021-05-24
Payer: MEDICARE

## 2021-05-24 ENCOUNTER — HOSPITAL ENCOUNTER (EMERGENCY)
Facility: HOSPITAL | Age: 81
Discharge: HOME | End: 2021-05-24
Attending: EMERGENCY MEDICINE | Admitting: EMERGENCY MEDICINE
Payer: MEDICARE

## 2021-05-24 VITALS
DIASTOLIC BLOOD PRESSURE: 90 MMHG | HEART RATE: 80 BPM | OXYGEN SATURATION: 97 % | TEMPERATURE: 97.2 F | SYSTOLIC BLOOD PRESSURE: 190 MMHG | WEIGHT: 120.4 LBS | RESPIRATION RATE: 18 BRPM | HEIGHT: 60 IN | BODY MASS INDEX: 23.64 KG/M2

## 2021-05-24 VITALS
HEART RATE: 58 BPM | SYSTOLIC BLOOD PRESSURE: 157 MMHG | TEMPERATURE: 97.3 F | RESPIRATION RATE: 11 BRPM | DIASTOLIC BLOOD PRESSURE: 80 MMHG | OXYGEN SATURATION: 96 %

## 2021-05-24 DIAGNOSIS — R07.9 CHEST PAIN: Primary | ICD-10-CM

## 2021-05-24 DIAGNOSIS — I10 HYPERTENSION: ICD-10-CM

## 2021-05-24 DIAGNOSIS — I10 UNCONTROLLED HYPERTENSION: Primary | ICD-10-CM

## 2021-05-24 LAB
ALBUMIN SERPL BCP-MCNC: 4 G/DL (ref 3.5–5)
ALP SERPL-CCNC: 88 U/L (ref 46–116)
ALT SERPL W P-5'-P-CCNC: 30 U/L (ref 12–78)
ANION GAP SERPL CALCULATED.3IONS-SCNC: 11 MMOL/L (ref 4–13)
AST SERPL W P-5'-P-CCNC: 25 U/L (ref 5–45)
BASOPHILS # BLD AUTO: 0.04 THOUSANDS/ΜL (ref 0–0.1)
BASOPHILS NFR BLD AUTO: 1 % (ref 0–1)
BILIRUB SERPL-MCNC: 0.39 MG/DL (ref 0.2–1)
BUN SERPL-MCNC: 18 MG/DL (ref 5–25)
CALCIUM SERPL-MCNC: 9.6 MG/DL (ref 8.3–10.1)
CHLORIDE SERPL-SCNC: 105 MMOL/L (ref 100–108)
CO2 SERPL-SCNC: 27 MMOL/L (ref 21–32)
CREAT SERPL-MCNC: 0.7 MG/DL (ref 0.6–1.3)
EOSINOPHIL # BLD AUTO: 0.03 THOUSAND/ΜL (ref 0–0.61)
EOSINOPHIL NFR BLD AUTO: 0 % (ref 0–6)
ERYTHROCYTE [DISTWIDTH] IN BLOOD BY AUTOMATED COUNT: 12.9 % (ref 11.6–15.1)
GFR SERPL CREATININE-BSD FRML MDRD: 83 ML/MIN/1.73SQ M
GLUCOSE SERPL-MCNC: 108 MG/DL (ref 65–140)
HCT VFR BLD AUTO: 45.7 % (ref 34.8–46.1)
HGB BLD-MCNC: 14.5 G/DL (ref 11.5–15.4)
IMM GRANULOCYTES # BLD AUTO: 0.01 THOUSAND/UL (ref 0–0.2)
IMM GRANULOCYTES NFR BLD AUTO: 0 % (ref 0–2)
LYMPHOCYTES # BLD AUTO: 1.71 THOUSANDS/ΜL (ref 0.6–4.47)
LYMPHOCYTES NFR BLD AUTO: 23 % (ref 14–44)
MCH RBC QN AUTO: 28.9 PG (ref 26.8–34.3)
MCHC RBC AUTO-ENTMCNC: 31.7 G/DL (ref 31.4–37.4)
MCV RBC AUTO: 91 FL (ref 82–98)
MONOCYTES # BLD AUTO: 0.31 THOUSAND/ΜL (ref 0.17–1.22)
MONOCYTES NFR BLD AUTO: 4 % (ref 4–12)
NEUTROPHILS # BLD AUTO: 5.38 THOUSANDS/ΜL (ref 1.85–7.62)
NEUTS SEG NFR BLD AUTO: 72 % (ref 43–75)
NRBC BLD AUTO-RTO: 0 /100 WBCS
PLATELET # BLD AUTO: 256 THOUSANDS/UL (ref 149–390)
PMV BLD AUTO: 11.3 FL (ref 8.9–12.7)
POTASSIUM SERPL-SCNC: 3.9 MMOL/L (ref 3.5–5.3)
PROT SERPL-MCNC: 7.6 G/DL (ref 6.4–8.2)
RBC # BLD AUTO: 5.01 MILLION/UL (ref 3.81–5.12)
SODIUM SERPL-SCNC: 143 MMOL/L (ref 136–145)
TROPONIN I SERPL-MCNC: <0.02 NG/ML
WBC # BLD AUTO: 7.48 THOUSAND/UL (ref 4.31–10.16)

## 2021-05-24 PROCEDURE — 36415 COLL VENOUS BLD VENIPUNCTURE: CPT | Performed by: EMERGENCY MEDICINE

## 2021-05-24 PROCEDURE — 85025 COMPLETE CBC W/AUTO DIFF WBC: CPT | Performed by: EMERGENCY MEDICINE

## 2021-05-24 PROCEDURE — 99284 EMERGENCY DEPT VISIT MOD MDM: CPT

## 2021-05-24 PROCEDURE — 99285 EMERGENCY DEPT VISIT HI MDM: CPT | Performed by: EMERGENCY MEDICINE

## 2021-05-24 PROCEDURE — 93005 ELECTROCARDIOGRAM TRACING: CPT

## 2021-05-24 PROCEDURE — 84484 ASSAY OF TROPONIN QUANT: CPT | Performed by: EMERGENCY MEDICINE

## 2021-05-24 PROCEDURE — 99213 OFFICE O/P EST LOW 20 MIN: CPT | Performed by: FAMILY MEDICINE

## 2021-05-24 PROCEDURE — 71045 X-RAY EXAM CHEST 1 VIEW: CPT

## 2021-05-24 PROCEDURE — 80053 COMPREHEN METABOLIC PANEL: CPT | Performed by: EMERGENCY MEDICINE

## 2021-05-24 PROCEDURE — 1124F ACP DISCUSS-NO DSCNMKR DOCD: CPT | Performed by: EMERGENCY MEDICINE

## 2021-05-24 NOTE — PROGRESS NOTES
[facility]        NAME: Niru Car is a 68 y o  female  : 1940    MRN: [id number]  DATE: May 24, 2021  TIME: 7:13 PM    Assessment and Plan   Uncontrolled hypertension [I10]  1  Uncontrolled hypertension  Transfer to other facility     Currently asymptomatic; repeat blood pressure of 190/90  Was sent to the ED for further evaluation management  ECG with an age indeterminate septal infarct which appears as early as  per EMR review  Denies any current chest pain or any other symptoms  Patient Instructions     Follow up with PCP in 3-5 days  Proceed to  ER if symptoms worsen  Chief Complaint     Chief Complaint   Patient presents with    Chest Pain     PATIENT STATED THAT SHE HAD PAIN ON LEFT SIDE OF CHEST PAIN LASTED 2 TO 3 MIN  SHE CHECKED HER BLOOD PRESURE AT HOME AND IT /98  IT WAS UNUSUAL TO BE THAT HIGH CURENTLY SHE IIS NOT HAVING ANYMORE CHEST PAINS    high blood presure         History of Present Illness     73-year-old female s/p left adrenalectomy due to uncontrolled hypertension presents today with a few minutes of a sharp stabbing chest pain and elevated blood pressure  In , she was diagnosed with hyperaldosteronism with secondary hypertension and underwent a left adrenalectomy  Since then her blood pressure has improved not requiring antihypertensive medications  She regularly checks her blood pressure finding it in the 120s/70s  Today while talking on the phone with her friend, she reports immediate onset of a sharp stabbing pain in the left lower chest which splints her breathing  The pain would worsen with deep respirations and lasted for about 2-3 minutes  The pain resolved as quickly as it came  Denies ever experiencing this before in the past   This prompted her to check her blood pressure, finding it elevated to 180s/98  Review of Systems   Review of Systems   Constitutional: Negative for chills, diaphoresis and fever     Eyes: Negative for visual disturbance  Respiratory: Positive for shortness of breath  Negative for cough, chest tightness and wheezing  Cardiovascular: Positive for chest pain  Negative for palpitations  Gastrointestinal: Negative for abdominal pain and nausea  Neurological: Negative for dizziness and headaches  Psychiatric/Behavioral: The patient is not nervous/anxious            Current Medications       Current Outpatient Medications:     cholecalciferol (VITAMIN D3) 1,000 units tablet, Take 1,000 Units by mouth daily, Disp: , Rfl:     cyanocobalamin (VITAMIN B-12) 100 mcg tablet, Take by mouth daily, Disp: , Rfl:     MAGNESIUM PO, Take 50 mg by mouth every morning , Disp: , Rfl:     potassium chloride (MICRO-K) 10 MEQ CR capsule, Take 10 mEq by mouth 2 (two) times a day, Disp: , Rfl:     Current Allergies     Allergies as of 05/24/2021 - Reviewed 05/24/2021   Allergen Reaction Noted    Latex  06/22/2016    Penicillins Hives 06/22/2016    Sulfa antibiotics Hives 06/22/2016            The following portions of the patient's history were reviewed and updated as appropriate: allergies, current medications, past family history, past medical history, past social history, past surgical history and problem list      Past Medical History:   Diagnosis Date    Acid reflux     Aneurysm ([address] )     is in a cavernous area with an meningioma-followed by MRI (left side)    Aneurysm ([address] )     brain    Arthritis     fingers    Bite from insect     had several wasp bites on each arm  7/11/`6    Colon polyp     Iron disorder     high level- 199, gave i unit of blood, level now may be lower    Lyme disease        Past Surgical History:   Procedure Laterality Date    ADENOIDECTOMY      ADRENALECTOMY Left 2010    ADRENALECTOMY Left 2010    APPENDECTOMY      CATARACT EXTRACTION      COLONOSCOPY      CYSTOSCOPY      ESOPHAGOGASTRODUODENOSCOPY  12/2015    FOOT SURGERY Bilateral     bunionectomy, neuroma removed ezequiel , sbertoe ezequiel   [place] EGD TRANSORAL BIOPSY SINGLE/MULTIPLE N/A 8/9/2017    Procedure: ESOPHAGOGASTRODUODENOSCOPY (EGD); Surgeon: Tomasa English MD;  Location: [facility]; Service: Gastroenterology    UT XCAPSL CTRC RMVL INSJ IO LENS PROSTH W/O ECP Left 7/21/2016    Procedure: EXTRACTION EXTRACAPSULAR CATARACT PHACO INTRAOCULAR LENS (IOL); Surgeon: Adriel Guillen MD;  Location: [facility] MAIN OR;  Service: Ophthalmology    UT XCAPSL CTRC RMVL INSJ IO LENS PROSTH W/O ECP Right 6/23/2016    Procedure: EXTRACTION EXTRACAPSULAR CATARACT PHACO INTRAOCULAR LENS (IOL); Surgeon: Adriel Guillen MD;  Location: [facility] OR;  Service: Ophthalmology    TONSILLECTOMY AND ADENOIDECTOMY      TUBAL LIGATION      UPPER GASTROINTESTINAL ENDOSCOPY         Family History   Problem Relation Age of Onset    COPD Mother         age 80    Hypertension Mother     Transient ischemic attack Father     Hypertension Father     Cancer Sister 70        breast    Heart disease Sister         silent MI from chemo         Medications have been verified  Objective   BP (!) 190/90 (BP Location: Left arm, Patient Position: Sitting, Cuff Size: Standard)   Pulse 80   Temp (!) 97 2 °F (36 2 °C) (Tympanic)   Resp 18   Ht 5' (1 524 m)   Wt 54 6 kg (120 lb 6 4 oz)   SpO2 97%   BMI 23 51 kg/m²   No LMP recorded  Patient is postmenopausal        Physical Exam     Physical Exam  Vitals signs and nursing note reviewed  Constitutional:       General: She is not in acute distress  Appearance: She is well-developed and normal weight  She is not ill-appearing, toxic-appearing or diaphoretic  HENT:      Head: Normocephalic and atraumatic  Cardiovascular:      Rate and Rhythm: Normal rate and regular rhythm  Pulses:           Radial pulses are 2+ on the right side and 2+ on the left side  Posterior tibial pulses are 2+ on the right side and 2+ on the left side  Heart sounds: Murmur present   Systolic murmur present with a grade of 2/6  Pulmonary:      Effort: Pulmonary effort is normal  No respiratory distress  Breath sounds: Normal breath sounds  No stridor  No decreased breath sounds, wheezing, rhonchi or rales  Skin:     General: Skin is warm  Findings: No erythema  Neurological:      General: No focal deficit present  Mental Status: She is alert and oriented to person, place, and time  Psychiatric:         Mood and Affect: Mood normal  Mood is not anxious  Behavior: Behavior normal  Behavior is not agitated

## 2021-05-25 LAB
ATRIAL RATE: 64 BPM
P AXIS: 68 DEGREES
PR INTERVAL: 188 MS
QRS AXIS: 44 DEGREES
QRSD INTERVAL: 72 MS
QT INTERVAL: 386 MS
QTC INTERVAL: 398 MS
T WAVE AXIS: 93 DEGREES
VENTRICULAR RATE: 64 BPM

## 2021-05-25 PROCEDURE — 93010 ELECTROCARDIOGRAM REPORT: CPT | Performed by: INTERNAL MEDICINE

## 2021-05-25 NOTE — DISCHARGE INSTRUCTIONS
Hypertension   WHAT YOU NEED TO KNOW:   Hypertension is high blood pressure  Your blood pressure is the force of your blood moving against the walls of your arteries  Hypertension causes your blood pressure to get so high that your heart has to work much harder than normal  This can damage your heart  The cause of hypertension may not be known  This is called essential or primary hypertension  Hypertension caused by another medical condition, such as kidney disease, is called secondary hypertension  DISCHARGE INSTRUCTIONS:   Call 911 for any of the following:   · You have chest pain  · You have any of the following signs of a heart attack:      ? Squeezing, pressure, or pain in your chest    ? You may  also have any of the following:     § Discomfort or pain in your back, neck, jaw, stomach, or arm    § Shortness of breath    § Nausea or vomiting    § Lightheadedness or a sudden cold sweat    · You become confused or have difficulty speaking  · You suddenly feel lightheaded or have trouble breathing  Seek care immediately if:   · You have a severe headache or vision loss  · You have weakness in an arm or leg  Contact your healthcare provider if:   · You feel faint, dizzy, confused, or drowsy  · You have been taking your blood pressure medicine but your pressure is higher than your provider says it should be  · You have questions or concerns about your condition or care  Medicines: You may  need any of the following:  · Antihypertensives  may be used to help lower your blood pressure  Several kinds of medicines are available  Your healthcare provider will choose medicines based on the kind of hypertension you have  You may need more than one type of medicine  Take the medicine exactly as directed  · Diuretics  help decrease extra fluid that collects in your body  This will help lower your blood pressure  You may urinate more often while you take this medicine      · Cholesterol medicine  helps lower your cholesterol level  A low cholesterol level helps prevent heart disease and makes it easier to control your blood pressure  · Take your medicine as directed  Contact your healthcare provider if you think your medicine is not helping or if you have side effects  Tell him or her if you are allergic to any medicine  Keep a list of the medicines, vitamins, and herbs you take  Include the amounts, and when and why you take them  Bring the list or the pill bottles to follow-up visits  Carry your medicine list with you in case of an emergency  Follow up with your healthcare provider as directed: You will need to return to have your blood pressure checked and to have other lab tests done  Write down your questions so you remember to ask them during your visits  Stages of hypertension:       · Normal blood pressure is 119/79 or lower   Your healthcare provider may only check your blood pressure each year if it stays at a normal level  · Elevated blood pressure is 120/79 to 129/79   This is sometimes called prehypertension  Your healthcare provider may suggest lifestyle changes to help lower your blood pressure to a normal level  He or she may then check it again in 3 to 6 months  · Stage 1 hypertension is 130/80  to 139/89   Your provider may recommend lifestyle changes, medication, and checks every 3 to 6 months until your blood pressure is controlled  · Stage 2 hypertension is 140/90 or higher   Your provider will recommend lifestyle changes and have you take 2 kinds of hypertension medicines  You will also need to have your blood pressure checked monthly until it is controlled  Manage hypertension:   · Check your blood pressure at home  Avoid smoking, caffeine, and exercise at least 30 minutes before checking your blood pressure  Sit and rest for 5 minutes before you take your blood pressure  Extend your arm and support it on a flat surface   Your arm should be at the same level as your heart  Follow the directions that came with your blood pressure monitor  Check your blood pressure 2 times, 1 minute apart, before you take your medicine in the morning  Also check your blood pressure before your evening meal  Keep a record of your readings and bring it to your follow-up visits  Ask your healthcare provider what your blood pressure should be  · Manage any other health conditions you have  Health conditions such as diabetes can increase your risk for hypertension  Follow your healthcare provider's instructions and take all your medicines as directed  · Ask about all medicines  Certain medicines can increase your blood pressure  Examples include oral birth control pills, decongestants, herbal supplements, and NSAIDs, such as ibuprofen  Your healthcare provider can tell you which medicines are safe for you to take  This includes prescription and over-the-counter medicines  Lifestyle changes you can make to manage hypertension:   · Limit sodium (salt) as directed  Too much sodium can affect your fluid balance  Check labels to find low-sodium or no-salt-added foods  Some low-sodium foods use potassium salts for flavor  Too much potassium can also cause health problems  Your healthcare provider will tell you how much sodium and potassium are safe for you to have in a day  He or she may recommend that you limit sodium to 2,300 mg a day  · Follow the meal plan recommended by your healthcare provider  A dietitian or your provider can give you more information on low-sodium plans or the DASH (Dietary Approaches to Stop Hypertension) eating plan  The DASH plan is low in sodium, unhealthy fats, and total fat  It is high in potassium, calcium, and fiber  · Exercise to maintain a healthy weight  Exercise at least 30 minutes per day, on most days of the week  This will help decrease your blood pressure   Ask your healthcare provider about the best exercise plan for you          · Decrease stress  This may help lower your blood pressure  Learn ways to relax, such as deep breathing or listening to music  · Limit alcohol as directed  Alcohol can increase your blood pressure  A drink of alcohol is 12 ounces of beer, 5 ounces of wine, or 1½ ounces of liquor  · Do not smoke  Nicotine and other chemicals in cigarettes and cigars can increase your blood pressure and also cause lung damage  Ask your healthcare provider for information if you currently smoke and need help to quit  E-cigarettes or smokeless tobacco still contain nicotine  Talk to your healthcare provider before you use these products  © Copyright [address] Information is for End User's use only and may not be sold, redistributed or otherwise used for commercial purposes  All illustrations and images included in CareNotes® are the copyrighted property of [employer]  or Ellen Yarbrough  The above information is an  only  It is not intended as medical advice for individual conditions or treatments  Talk to your doctor, nurse or pharmacist before following any medical regimen to see if it is safe and effective for you

## 2021-05-25 NOTE — ED PROVIDER NOTES
History  Chief Complaint   Patient presents with    Hypertension     had episode earlier of palpitations and elevation in blood pressure at home  went to urgent care who referred her here  no shortness of breath or chest pain     Patient presents for evaluation of high blood pressure  Patient states she went to urgent care was sent him for a blood pressure was elevated  Patient states she does not have a history of high blood pressure is not on any medications  She has checked her blood pressure home before and has been normal   Today patient experience 2-3 minutes of left lower chest pain the wound away after she took couple deep breaths  No shortness of breath or factors  Pain did not radiate anywhere  Pain has since resolved completely  After that she took her blood pressure was elevated  She kept checking afterwards and kept going higher  Patient remains chest pain-free at this time  History provided by:  Patient   used: No    Hypertension  Associated symptoms: chest pain    Associated symptoms: no abdominal pain, no fever, no headaches, no nausea, no neck pain, no shortness of breath, not vomiting and no weakness        Prior to Admission Medications   Prescriptions Last Dose Informant Patient Reported? Taking? MAGNESIUM PO  Self Yes No   Sig: Take 50 mg by mouth every morning     cholecalciferol (VITAMIN D3) 1,000 units tablet  Self Yes No   Sig: Take 1,000 Units by mouth daily   cyanocobalamin (VITAMIN B-12) 100 mcg tablet  Self Yes No   Sig: Take by mouth daily   potassium chloride (MICRO-K) 10 MEQ CR capsule  Self Yes No   Sig: Take 10 mEq by mouth 2 (two) times a day      Facility-Administered Medications: None       Past Medical History:   Diagnosis Date    Acid reflux     Aneurysm ([address] )     is in a cavernous area with an meningioma-followed by MRI (left side)    Aneurysm (HCC)     brain    Arthritis     fingers    Bite from insect     had several wasp bites on each arm  7/11/`6    Colon polyp     Iron disorder     high level- 199, gave i unit of blood, level now may be lower    Lyme disease        Past Surgical History:   Procedure Laterality Date    ADENOIDECTOMY      ADRENALECTOMY Left 2010    ADRENALECTOMY Left 2010    APPENDECTOMY      CATARACT EXTRACTION      COLONOSCOPY      CYSTOSCOPY      ESOPHAGOGASTRODUODENOSCOPY  12/2015    FOOT SURGERY Bilateral     bunionectomy, neuroma removed ezequiel , hammertoe ezequiel   IA EGD TRANSORAL BIOPSY SINGLE/MULTIPLE N/A 8/9/2017    Procedure: ESOPHAGOGASTRODUODENOSCOPY (EGD); Surgeon: Jalen Bowen MD;  Location: [facility]; Service: Gastroenterology    IA XCAPSL CTRC RMVL INSJ IO LENS PROSTH W/O ECP Left 7/21/2016    Procedure: EXTRACTION EXTRACAPSULAR CATARACT PHACO INTRAOCULAR LENS (IOL); Surgeon: Noni Armstrong MD;  Location: [facility];  Service: Ophthalmology    IA XCAPSL CTRC RMVL INSJ IO LENS PROSTH W/O ECP Right 6/23/2016    Procedure: EXTRACTION EXTRACAPSULAR CATARACT PHACO INTRAOCULAR LENS (IOL); Surgeon: Noni Armstrong MD;  Location: [facility];  Service: Ophthalmology    TONSILLECTOMY AND ADENOIDECTOMY      TUBAL LIGATION      UPPER GASTROINTESTINAL ENDOSCOPY         Family History   Problem Relation Age of Onset    COPD Mother         age 80    Hypertension Mother     Transient ischemic attack Father     Hypertension Father     Cancer Sister 70        breast    Heart disease Sister         silent MI from chemo     I have reviewed and agree with the history as documented      E-Cigarette/Vaping    E-Cigarette Use Never User      E-Cigarette/Vaping Substances    Nicotine No     THC No     CBD No     Flavoring No     Other No     Unknown No      Social History     Tobacco Use    Smoking status: Never Smoker    Smokeless tobacco: Never Used   Substance Use Topics    Alcohol use: Never     Frequency: Never    Drug use: No       Review of Systems   Constitutional: Negative for chills and fever  HENT: Negative for congestion and sore throat  Respiratory: Negative for shortness of breath  Cardiovascular: Positive for chest pain  Gastrointestinal: Negative for abdominal pain, nausea and vomiting  Genitourinary: Negative for difficulty urinating and dysuria  Musculoskeletal: Negative for back pain and neck pain  Neurological: Negative for weakness, numbness and headaches  All other systems reviewed and are negative  Physical Exam  Physical Exam  Vitals signs and nursing note reviewed  Constitutional:       General: She is not in acute distress  Appearance: Normal appearance  HENT:      Head: Atraumatic  Right Ear: External ear normal       Left Ear: External ear normal       Nose: Nose normal       Mouth/Throat:      Mouth: Mucous membranes are moist       Pharynx: Oropharynx is clear  Eyes:      General: No scleral icterus  Conjunctiva/sclera: Conjunctivae normal    Cardiovascular:      Rate and Rhythm: Normal rate and regular rhythm  Pulses: Normal pulses  Pulmonary:      Effort: Pulmonary effort is normal  No respiratory distress  Breath sounds: Normal breath sounds  Abdominal:      General: Abdomen is flat  Bowel sounds are normal  There is no distension  Palpations: Abdomen is soft  Tenderness: There is no abdominal tenderness  There is no guarding or rebound  Musculoskeletal: Normal range of motion  General: No deformity  Skin:     Capillary Refill: Capillary refill takes less than 2 seconds  Findings: No rash  Neurological:      General: No focal deficit present  Mental Status: She is alert and oriented to person, place, and time           Vital Signs  ED Triage Vitals [05/24/21 1940]   Temperature Pulse Respirations Blood Pressure SpO2   (!) 97 3 °F (36 3 °C) 79 20 (!) 190/92 98 %      Temp Source Heart Rate Source Patient Position - Orthostatic VS BP Location FiO2 (%)   Tympanic Monitor Sitting Left arm --      Pain Score       No Pain           Vitals:    05/24/21 1940 05/24/21 2100 05/24/21 2130 05/24/21 2200   BP: (!) 190/92 149/83 161/77 157/80   Pulse: 79  58 58   Patient Position - Orthostatic VS: Sitting Sitting Lying Lying         Visual Acuity      ED Medications  Medications - No data to display    Diagnostic Studies  Results Reviewed     Procedure Component Value Units Date/Time    Troponin I [[id number]]  (Normal) Collected: 05/24/21 2102    Lab Status: Final result Specimen: Blood from Arm, Right Updated: 05/24/21 2132     Troponin I <0 02 ng/mL     Comprehensive metabolic panel [[id number]] Collected: 05/24/21 2102    Lab Status: Final result Specimen: Blood from Arm, Right Updated: 05/24/21 2127     Sodium 143 mmol/L      Potassium 3 9 mmol/L      Chloride 105 mmol/L      CO2 27 mmol/L      ANION GAP 11 mmol/L      BUN 18 mg/dL      Creatinine 0 70 mg/dL      Glucose 108 mg/dL      Calcium 9 6 mg/dL      AST 25 U/L      ALT 30 U/L      Alkaline Phosphatase 88 U/L      Total Protein 7 6 g/dL      Albumin 4 0 g/dL      Total Bilirubin 0 39 mg/dL      eGFR 83 ml/min/1 73sq m     Narrative:      [place] guidelines for Chronic Kidney Disease (CKD):     Stage 1 with normal or high GFR (GFR > 90 mL/min/1 73 square meters)    Stage 2 Mild CKD (GFR = 60-89 mL/min/1 73 square meters)    Stage 3A Moderate CKD (GFR = 45-59 mL/min/1 73 square meters)    Stage 3B Moderate CKD (GFR = 30-44 mL/min/1 73 square meters)    Stage 4 Severe CKD (GFR = 15-29 mL/min/1 73 square meters)    Stage 5 End Stage CKD (GFR <15 mL/min/1 73 square meters)  Note: GFR calculation is accurate only with a steady state creatinine    CBC and differential [[id number]] Collected: 05/24/21 2102    Lab Status: Final result Specimen: Blood from Arm, Right Updated: 05/24/21 2111     WBC 7 48 Thousand/uL      RBC 5 01 Million/uL      Hemoglobin 14 5 g/dL      Hematocrit 45 7 %      MCV 91 fL      MCH 28 9 pg MCHC 31 7 g/dL      RDW 12 9 %      MPV 11 3 fL      Platelets 159 Thousands/uL      nRBC 0 /100 WBCs      Neutrophils Relative 72 %      Immat GRANS % 0 %      Lymphocytes Relative 23 %      Monocytes Relative 4 %      Eosinophils Relative 0 %      Basophils Relative 1 %      Neutrophils Absolute 5 38 Thousands/µL      Immature Grans Absolute 0 01 Thousand/uL      Lymphocytes Absolute 1 71 Thousands/µL      Monocytes Absolute 0 31 Thousand/µL      Eosinophils Absolute 0 03 Thousand/µL      Basophils Absolute 0 04 Thousands/µL                  XR chest 1 view portable    (Results Pending)              Procedures  Procedures         ED Course             HEART Risk Score      Most Recent Value   Heart Score Risk Calculator   History  0 Filed at: 05/24/2021 2141   ECG  0 Filed at: 05/24/2021 2141   Age  2 Filed at: 05/24/2021 2141   Risk Factors  1 Filed at: 05/24/2021 2141   Troponin  0 Filed at: 05/24/2021 2141   HEART Score  3 Filed at: 05/24/2021 2141                                    MDM  Number of Diagnoses or Management Options  Chest pain:   Hypertension:   Diagnosis management comments: Pulse ox 96% on room air indicating adequate oxygenation    CXR: NAD as read by me       Amount and/or Complexity of Data Reviewed  Clinical lab tests: ordered and reviewed  Tests in the radiology section of CPT®: ordered and reviewed  Decide to obtain previous medical records or to obtain history from someone other than the patient: yes  Review and summarize past medical records: yes  Independent visualization of images, tracings, or specimens: yes    Patient Progress  Patient progress: stable      Disposition  Final diagnoses:   Chest pain   Hypertension     Time reflects when diagnosis was documented in both MDM as applicable and the Disposition within this note     Time User Action Codes Description Comment    5/24/2021  9:41 PM Keyur Vasquez Add [R07 9] Chest pain     5/24/2021  9:41 PM [place] [id number] [facility] Hypertension       ED Disposition     ED Disposition Condition Date/Time Comment    Discharge Stable Mon May 24, 2021  9:41  [facility]  discharge to home/self care  Follow-up Information     Follow up With Specialties Details Why [address] [facility] In 1 week  [address]  [phone]            Discharge Medication List as of 5/24/2021  9:41 PM      CONTINUE these medications which have NOT CHANGED    Details   cholecalciferol (VITAMIN D3) 1,000 units tablet Take 1,000 Units by mouth daily, Historical Med      cyanocobalamin (VITAMIN B-12) 100 mcg tablet Take by mouth daily, Historical Med      MAGNESIUM PO Take 50 mg by mouth every morning , Historical Med      potassium chloride (MICRO-K) 10 MEQ CR capsule Take 10 mEq by mouth 2 (two) times a day, Historical Med           No discharge procedures on file      PDMP Review     None          ED Provider  Electronically Signed by           Diane Díaz DO  05/24/21 6955

## 2021-05-25 NOTE — ED PROCEDURE NOTE
PROCEDURE  ECG 12 Lead Documentation Only    Date/Time: 5/24/2021 8:41 PM  Performed by: Reyna Hsieh DO  Authorized by: Reyna Hsieh DO     ECG reviewed by me, the ED Provider: yes    Patient location:  ED  Interpretation:     Interpretation: abnormal    Rate:     ECG rate:  64    ECG rate assessment: normal    Rhythm:     Rhythm: sinus rhythm    Ectopy:     Ectopy: none    ST segments:     ST segments:  Normal  T waves:     T waves: normal    Q waves:     Q waves:  V1, V2 and V3  Other findings:     Other findings: [id number] Juan Jose Casillas DO  05/24/21 2042

## 2021-05-28 LAB
ATRIAL RATE: 78 BPM
P AXIS: -12 DEGREES
PR INTERVAL: 182 MS
QRS AXIS: 19 DEGREES
QRSD INTERVAL: 80 MS
QT INTERVAL: 386 MS
QTC INTERVAL: 440 MS
T WAVE AXIS: -16 DEGREES
VENTRICULAR RATE: 78 BPM

## 2021-05-28 PROCEDURE — 93010 ELECTROCARDIOGRAM REPORT: CPT | Performed by: INTERNAL MEDICINE

## 2021-06-23 ENCOUNTER — OFFICE VISIT (OUTPATIENT)
Dept: GASTROENTEROLOGY | Facility: CLINIC | Age: 81
End: 2021-06-23
Payer: MEDICARE

## 2021-06-23 VITALS
SYSTOLIC BLOOD PRESSURE: 168 MMHG | HEART RATE: 62 BPM | DIASTOLIC BLOOD PRESSURE: 90 MMHG | HEIGHT: 60 IN | BODY MASS INDEX: 23.75 KG/M2 | WEIGHT: 121 LBS

## 2021-06-23 DIAGNOSIS — K76.89 LIVER CYST: ICD-10-CM

## 2021-06-23 DIAGNOSIS — R10.10 UPPER ABDOMINAL PAIN: Primary | ICD-10-CM

## 2021-06-23 DIAGNOSIS — K62.1 DYSPLASTIC RECTAL POLYP: ICD-10-CM

## 2021-06-23 DIAGNOSIS — K31.A0 INTESTINAL METAPLASIA OF GASTRIC MUCOSA: ICD-10-CM

## 2021-06-23 DIAGNOSIS — K22.70 BARRETT'S ESOPHAGUS WITHOUT DYSPLASIA: ICD-10-CM

## 2021-06-23 PROCEDURE — 99214 OFFICE O/P EST MOD 30 MIN: CPT | Performed by: INTERNAL MEDICINE

## 2021-06-23 RX ORDER — ATENOLOL 25 MG/1
25 TABLET ORAL DAILY
COMMUNITY
Start: 2021-06-11 | End: 2021-10-04

## 2021-06-23 RX ORDER — ATENOLOL 25 MG/1
TABLET ORAL
COMMUNITY
Start: 2021-06-11 | End: 2021-10-05 | Stop reason: HOSPADM

## 2021-06-23 NOTE — PROGRESS NOTES
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  -     EGD; Future; Expected date: 06/23/2021    3  Intestinal metaplasia of gastric mucosa  -     EGD; Future; Expected date: 06/23/2021    4  Dysplastic rectal polyp  -     Flexible Sigmoidoscopy; Future; Expected date: 06/23/2021    5  Liver cyst      Will plan for EGD/Flex sig in September for evaluation of Johnson's esophagus and tubulillous adenoma    ______________________________________________________________________    SUBJECTIVE:  This is a 68-year-old female who presents for follow-up  She has past medical history including GERD, aneurysm, arthritis, Lyme disease, colon polyps, appendectomy, left adrenalectomy  She was reporting intermittent upper abdominal pain after last visit so CT abdomen/pelvis was done which showed no acute abdominopelvic findings  She had a new, mild T12 compression fracture of indeterminate age  She was also found to have sub centimeter hepatic cysts and 2 4cm simple cyst in left lobe of the liver  She states last time she noticed the pain was about a week ago and lasted a couple minutes, it was not related to eating  Denies any reflux, heartburn, nausea, vomiting, unintended weight loss  She still has her gallbladder  Last EGD was in September 2020 which showed gastritis with gastric erosion, multiple ring in the esophagus, normal GJ, normal duodenum  The pain doesn't happen every week  Gastric bx negative for h pylori and intestinal metaplasia, esophageal bx c/w johnson's esophagus with out dysplasia       Colonoscopy in January 2021 revealed large flat sessile 4 cm rectal polyp which was removed with hot snare polypectomy, small cecal polyp also removed, and extensive diverticulosis with redundant left colon  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 with identifiable cauterized polypectomy margins negative for high-grade dysplasia  Cecal polyp was benign polypoid colonic mucosa  EGD August 2017 which showed nodular gastric mucosa in the antrum, flattened small bowel folds, small sliding hiatal hernia, normal esophagus  Duodenal biopsy showed no significant pathologic abnormalities, gastric biopsy showed chronic inactive gastritis negative for H pylori  Intestinal metaplasia was present  Lower esophageal biopsy showed no pathologic abnormality  REVIEW OF SYSTEMS IS OTHERWISE NEGATIVE  Historical Information   Past Medical History:   Diagnosis Date    Acid reflux     Aneurysm ([address] )     is in a cavernous area with an meningioma-followed by MRI (left side)    Aneurysm (HCC)     brain    Arthritis     fingers    Bite from insect     had several wasp bites on each arm  7/11/`6    Colon polyp     Iron disorder     high level- 199, gave i unit of blood, level now may be lower    Lyme disease      Past Surgical History:   Procedure Laterality Date    ADENOIDECTOMY      ADRENALECTOMY Left 2010    ADRENALECTOMY Left 2010    APPENDECTOMY      CATARACT EXTRACTION      COLONOSCOPY      CYSTOSCOPY      ESOPHAGOGASTRODUODENOSCOPY  12/2015    FOOT SURGERY Bilateral     bunionectomy, neuroma removed ezequiel , hammertoe ezequiel   [place] EGD TRANSORAL BIOPSY SINGLE/MULTIPLE N/A 8/9/2017    Procedure: ESOPHAGOGASTRODUODENOSCOPY (EGD); Surgeon: Klarissa Hough MD;  Location: [facility] GI LAB; Service: Gastroenterology    WY XCAPSL CTRC RMVL INSJ IO LENS PROSTH W/O ECP Left 7/21/2016    Procedure: EXTRACTION EXTRACAPSULAR CATARACT PHACO INTRAOCULAR LENS (IOL);   Surgeon: Abel Dickens MD;  Location: [facility] MAIN OR;  Service: Ophthalmology    W[place] XCAPSL CTRC RMVL INSJ IO LENS PROSTH W/O ECP Right 6/23/2016    Procedure: EXTRACTION EXTRACAPSULAR CATARACT PHACO INTRAOCULAR LENS (IOL); Surgeon: Hal Levin MD;  Location: [facility] MAIN OR;  Service: Ophthalmology    TONSILLECTOMY AND ADENOIDECTOMY      TUBAL LIGATION      UPPER GASTROINTESTINAL ENDOSCOPY       Social History   Social History     Substance and Sexual Activity   Alcohol Use Never     Social History     Substance and Sexual Activity   Drug Use No     Social History     Tobacco Use   Smoking Status Never Smoker   Smokeless Tobacco Never Used     Family History   Problem Relation Age of Onset    COPD Mother         age 80    Hypertension Mother     Transient ischemic attack Father     Hypertension Father     Cancer Sister 70        breast    Heart disease Sister         silent MI from chemo       Meds/Allergies       Current Outpatient Medications:     atenolol (TENORMIN) 25 mg tablet    atenolol (TENORMIN) 25 mg tablet    cholecalciferol (VITAMIN D3) 1,000 units tablet    cyanocobalamin (VITAMIN B-12) 100 mcg tablet    MAGNESIUM PO    potassium chloride (MICRO-K) 10 MEQ CR capsule    Allergies   Allergen Reactions    Latex      Burning to vaginal area with GYN exam    Penicillins Hives    Sulfa Antibiotics Hives           Objective     Blood pressure 168/90, pulse 62, height 5' (1 524 m), weight 54 9 kg (121 lb), not currently breastfeeding  Body mass index is 23 63 kg/m²  PHYSICAL EXAM:      General Appearance:   Alert, cooperative, no distress   HEENT:   Normocephalic, atraumatic, anicteric  Neck:  Supple, symmetrical, trachea midline   Lungs:   Clear to auscultation bilaterally; no rales, rhonchi or wheezing; respirations unlabored    Heart[de-identified]   Regular rate and rhythm; no murmur     Abdomen:   Soft, non-tender, non-distended; normal bowel sounds; no masses, no organomegaly    Genitalia:   Deferred    Rectal:   Deferred    Extremities:  No cyanosis, clubbing or edema    Skin:  No jaundice, rashes, or lesions    Lymph nodes:  No palpable cervical lymphadenopathy        Lab Results: No visits with results within 1 Day(s) from this visit     Latest known visit with results is:   Admission on 05/24/2021, Discharged on 05/24/2021   Component Date Value    WBC 05/24/2021 7 48     RBC 05/24/2021 5 01     Hemoglobin 05/24/2021 14 5     Hematocrit 05/24/2021 45 7     MCV 05/24/2021 91     MCH 05/24/2021 28 9     MCHC 05/24/2021 31 7     RDW 05/24/2021 12 9     MPV 05/24/2021 11 3     Platelets 56/36/5014 256     nRBC 05/24/2021 0     Neutrophils Relative 05/24/2021 72     Immat GRANS % 05/24/2021 0     Lymphocytes Relative 05/24/2021 23     Monocytes Relative 05/24/2021 4     Eosinophils Relative 05/24/2021 0     Basophils Relative 05/24/2021 1     Neutrophils Absolute 05/24/2021 5 38     Immature Grans Absolute 05/24/2021 0 01     Lymphocytes Absolute 05/24/2021 1 71     Monocytes Absolute 05/24/2021 0 31     Eosinophils Absolute 05/24/2021 0 03     Basophils Absolute 05/24/2021 0 04     Sodium 05/24/2021 143     Potassium 05/24/2021 3 9     Chloride 05/24/2021 105     CO2 05/24/2021 27     ANION GAP 05/24/2021 11     BUN 05/24/2021 18     Creatinine 05/24/2021 0 70     Glucose 05/24/2021 108     Calcium 05/24/2021 9 6     AST 05/24/2021 25     ALT 05/24/2021 30     Alkaline Phosphatase 05/24/2021 88     Total Protein 05/24/2021 7 6     Albumin 05/24/2021 4 0     Total Bilirubin 05/24/2021 0 39     eGFR 05/24/2021 83     Troponin I 05/24/2021 <0 02     Ventricular Rate 05/24/2021 64     Atrial Rate 05/24/2021 64     AL Interval 05/24/2021 188     QRSD Interval 05/24/2021 72     QT Interval 05/24/2021 386     QTC Interval 05/24/2021 398     P Axis 05/24/2021 68     QRS Axis 05/24/2021 44     T Wave Axis 05/24/2021 93     Ventricular Rate 05/24/2021 78     Atrial Rate 05/24/2021 78     AL Interval 05/24/2021 182     QRSD Interval 05/24/2021 80     QT Interval 05/24/2021 386     QTC Interval 05/24/2021 440     P Axis 05/24/2021 -12     QRS Axis 05/24/2021 19     T Wave Axis 05/24/2021 -16          Radiology Results:   XR chest 1 view portable    Result Date: 5/25/2021  Narrative: CHEST INDICATION:   chest pain  COMPARISON:  02/02/2020 EXAM PERFORMED/VIEWS:  XR CHEST PORTABLE 1 image FINDINGS: Cardiomediastinal silhouette appears unremarkable  The lungs are clear  The lungs are hyperinflated  Pulmonary vessels are normal   No pneumothorax or pleural effusion  Osseous structures appear within normal limits for patient age  Impression: No acute cardiopulmonary disease   Workstation performed: [id number]

## 2021-06-28 ENCOUNTER — APPOINTMENT (OUTPATIENT)
Dept: LAB | Facility: CLINIC | Age: 81
End: 2021-06-28
Payer: MEDICARE

## 2021-06-28 DIAGNOSIS — Z13.220 SCREENING FOR LIPOID DISORDERS: ICD-10-CM

## 2021-06-28 LAB
CHOLEST SERPL-MCNC: 219 MG/DL (ref 50–200)
HDLC SERPL-MCNC: 73 MG/DL
LDLC SERPL CALC-MCNC: 131 MG/DL (ref 0–100)
NONHDLC SERPL-MCNC: 146 MG/DL
TRIGL SERPL-MCNC: 75 MG/DL

## 2021-06-28 PROCEDURE — 80061 LIPID PANEL: CPT

## 2021-06-28 PROCEDURE — 36415 COLL VENOUS BLD VENIPUNCTURE: CPT

## 2021-09-21 ENCOUNTER — TELEPHONE (OUTPATIENT)
Dept: GASTROENTEROLOGY | Facility: CLINIC | Age: 81
End: 2021-09-21

## 2021-09-24 NOTE — TELEPHONE ENCOUNTER
Call from [facility], Clearance not recd yet  Placed call to Dr Drew Briggs office and spoke to Sola kapoor  There is some Confusion about the patient's monitor  Pt states she sent that back weeks ago and The office told me she was still wearing it  Called back and spoke to [address] and she is trying to get someone to do clearance as Dr Vanessa Harmon is not in today  She will return my call when she knows something

## 2021-09-27 RX ORDER — PROPRANOLOL/HYDROCHLOROTHIAZID 40 MG-25MG
TABLET ORAL AS NEEDED
COMMUNITY
End: 2021-10-04

## 2021-09-27 NOTE — TELEPHONE ENCOUNTER
Received call from [facility] that cardiac clearance still needs to state patient can have procedure under mac anesthesia and is cleared from a cardiac standpoint  I called and left message on the nurses line at Dr Harsha Menendez office for them to call me regarding clearance and pt is on for EGD/Flex Sig tomorrow  Clearance received but this isn't checked off on check list  Awaiting call from their office

## 2021-09-28 ENCOUNTER — ANESTHESIA EVENT (OUTPATIENT)
Dept: GASTROENTEROLOGY | Facility: AMBULATORY SURGERY CENTER | Age: 81
End: 2021-09-28

## 2021-09-28 ENCOUNTER — ANESTHESIA (OUTPATIENT)
Dept: GASTROENTEROLOGY | Facility: AMBULATORY SURGERY CENTER | Age: 81
End: 2021-09-28

## 2021-09-28 ENCOUNTER — HOSPITAL ENCOUNTER (OUTPATIENT)
Dept: GASTROENTEROLOGY | Facility: AMBULATORY SURGERY CENTER | Age: 81
Discharge: HOME | End: 2021-09-28
Payer: MEDICARE

## 2021-09-28 VITALS
TEMPERATURE: 98.3 F | HEIGHT: 60 IN | WEIGHT: 114 LBS | HEART RATE: 60 BPM | OXYGEN SATURATION: 99 % | SYSTOLIC BLOOD PRESSURE: 124 MMHG | DIASTOLIC BLOOD PRESSURE: 78 MMHG | RESPIRATION RATE: 18 BRPM | BODY MASS INDEX: 22.38 KG/M2

## 2021-09-28 DIAGNOSIS — K22.70 BARRETT'S ESOPHAGUS WITHOUT DYSPLASIA: ICD-10-CM

## 2021-09-28 DIAGNOSIS — K31.A0 INTESTINAL METAPLASIA OF GASTRIC MUCOSA: ICD-10-CM

## 2021-09-28 DIAGNOSIS — K62.1 DYSPLASTIC RECTAL POLYP: ICD-10-CM

## 2021-09-28 DIAGNOSIS — R10.10 UPPER ABDOMINAL PAIN: ICD-10-CM

## 2021-09-28 PROBLEM — I48.0 PAROXYSMAL ATRIAL FIBRILLATION (HCC): Status: ACTIVE | Noted: 2021-09-28

## 2021-09-28 PROCEDURE — 43239 EGD BIOPSY SINGLE/MULTIPLE: CPT | Performed by: INTERNAL MEDICINE

## 2021-09-28 PROCEDURE — 45338 SIGMOIDOSCOPY W/TUMR REMOVE: CPT | Performed by: INTERNAL MEDICINE

## 2021-09-28 PROCEDURE — 88305 TISSUE EXAM BY PATHOLOGIST: CPT | Performed by: PATHOLOGY

## 2021-09-28 PROCEDURE — 99100 ANES PT EXTEME AGE<1 YR&>70: CPT | Performed by: NURSE ANESTHETIST, CERTIFIED REGISTERED

## 2021-09-28 PROCEDURE — 00813 ANES UPR LWR GI NDSC PX: CPT | Performed by: NURSE ANESTHETIST, CERTIFIED REGISTERED

## 2021-09-28 RX ORDER — PANTOPRAZOLE SODIUM 40 MG/1
40 TABLET, DELAYED RELEASE ORAL DAILY
Qty: 30 TABLET | Refills: 2 | Status: SHIPPED | OUTPATIENT
Start: 2021-09-28 | End: 2021-10-02 | Stop reason: SINTOL

## 2021-09-28 RX ORDER — PROPOFOL 10 MG/ML
INJECTION, EMULSION INTRAVENOUS AS NEEDED
Status: DISCONTINUED | OUTPATIENT
Start: 2021-09-28 | End: 2021-09-28

## 2021-09-28 RX ORDER — SODIUM CHLORIDE 9 MG/ML
30 INJECTION, SOLUTION INTRAVENOUS CONTINUOUS
Status: DISCONTINUED | OUTPATIENT
Start: 2021-09-28 | End: 2021-10-02 | Stop reason: HOSPADM

## 2021-09-28 RX ORDER — LIDOCAINE HYDROCHLORIDE 10 MG/ML
INJECTION, SOLUTION EPIDURAL; INFILTRATION; INTRACAUDAL; PERINEURAL AS NEEDED
Status: DISCONTINUED | OUTPATIENT
Start: 2021-09-28 | End: 2021-09-28

## 2021-09-28 RX ADMIN — PROPOFOL 50 MG: 10 INJECTION, EMULSION INTRAVENOUS at 08:06

## 2021-09-28 RX ADMIN — PROPOFOL 30 MG: 10 INJECTION, EMULSION INTRAVENOUS at 08:08

## 2021-09-28 RX ADMIN — PROPOFOL 200 MG: 10 INJECTION, EMULSION INTRAVENOUS at 08:03

## 2021-09-28 RX ADMIN — LIDOCAINE HYDROCHLORIDE 50 MG: 10 INJECTION, SOLUTION EPIDURAL; INFILTRATION; INTRACAUDAL; PERINEURAL at 08:03

## 2021-09-28 RX ADMIN — SODIUM CHLORIDE: 9 INJECTION, SOLUTION INTRAVENOUS at 07:48

## 2021-09-28 RX ADMIN — PROPOFOL 20 MG: 10 INJECTION, EMULSION INTRAVENOUS at 08:10

## 2021-09-28 NOTE — ANESTHESIA POSTPROCEDURE EVALUATION
Post-Op Assessment Note    CV Status:  Stable  Pain Score: 0    Pain management: adequate     Mental Status:  Sleepy   Hydration Status:  Stable   PONV Controlled:  None   Airway Patency:  Patent      Post Op Vitals Reviewed: Yes      Staff: CRNA         No complications documented  BP      Temp      Pulse     Resp      SpO2      Did not provide pacu care

## 2021-09-28 NOTE — ANESTHESIA PREPROCEDURE EVALUATION
Procedure:  EGD  FLEXIBLE SIGMOIDOSCOPY    Relevant Problems   ANESTHESIA (within normal limits)      CARDIO   (+) Paroxysmal atrial fibrillation (HCC)      PULMONARY (within normal limits)        Physical Exam    Airway    Mallampati score: I  TM Distance: >3 FB  Neck ROM: full     Dental   No notable dental hx     Cardiovascular  Rhythm: regular, Rate: normal, Cardiovascular exam normal    Pulmonary  Pulmonary exam normal Breath sounds clear to auscultation,     Other Findings        Anesthesia Plan  ASA Score- 2     Anesthesia Type- IV sedation with anesthesia with ASA Monitors  Additional Monitors:   Airway Plan:     Comment: Nasal cannula  Plan Factors-Exercise tolerance (METS): >4 METS  Chart reviewed  EKG reviewed  Imaging results reviewed  Existing labs reviewed  Patient summary reviewed  Patient is not a current smoker  Patient not instructed to abstain from smoking on day of procedure  Patient did not smoke on day of surgery  Obstructive sleep apnea risk education given perioperatively  Induction- intravenous  Postoperative Plan-     Informed Consent- Anesthetic plan and risks discussed with patient  I personally reviewed this patient with the CRNA  Discussed and agreed on the Anesthesia Plan with the CRNA  Daquan Shen

## 2021-10-02 ENCOUNTER — NURSE TRIAGE (OUTPATIENT)
Dept: OTHER | Facility: OTHER | Age: 81
End: 2021-10-02

## 2021-10-02 DIAGNOSIS — K21.9 GASTROESOPHAGEAL REFLUX DISEASE WITHOUT ESOPHAGITIS: Primary | ICD-10-CM

## 2021-10-02 RX ORDER — FAMOTIDINE 40 MG/1
40 TABLET, FILM COATED ORAL DAILY
Qty: 30 TABLET | Refills: 0 | Status: ON HOLD | OUTPATIENT
Start: 2021-10-02 | End: 2021-10-05 | Stop reason: SDUPTHER

## 2021-10-02 RX ORDER — FAMOTIDINE 40 MG/1
40 TABLET, FILM COATED ORAL DAILY
Qty: 30 TABLET | Refills: 2 | Status: SHIPPED | OUTPATIENT
Start: 2021-10-02 | End: 2021-10-02

## 2021-10-04 ENCOUNTER — APPOINTMENT (EMERGENCY)
Dept: RADIOLOGY | Facility: HOSPITAL | Age: 81
End: 2021-10-04
Payer: MEDICARE

## 2021-10-04 ENCOUNTER — HOSPITAL ENCOUNTER (OUTPATIENT)
Facility: HOSPITAL | Age: 81
Setting detail: OBSERVATION
Discharge: HOME | End: 2021-10-05
Attending: EMERGENCY MEDICINE | Admitting: INTERNAL MEDICINE
Payer: MEDICARE

## 2021-10-04 DIAGNOSIS — R00.2 PALPITATIONS: Primary | ICD-10-CM

## 2021-10-04 DIAGNOSIS — K21.9 GASTROESOPHAGEAL REFLUX DISEASE WITHOUT ESOPHAGITIS: ICD-10-CM

## 2021-10-04 DIAGNOSIS — I49.3 PVC (PREMATURE VENTRICULAR CONTRACTION): ICD-10-CM

## 2021-10-04 PROCEDURE — 36415 COLL VENOUS BLD VENIPUNCTURE: CPT | Performed by: EMERGENCY MEDICINE

## 2021-10-04 PROCEDURE — 80053 COMPREHEN METABOLIC PANEL: CPT | Performed by: EMERGENCY MEDICINE

## 2021-10-04 PROCEDURE — 99285 EMERGENCY DEPT VISIT HI MDM: CPT

## 2021-10-04 PROCEDURE — 83880 ASSAY OF NATRIURETIC PEPTIDE: CPT | Performed by: EMERGENCY MEDICINE

## 2021-10-04 PROCEDURE — 83735 ASSAY OF MAGNESIUM: CPT | Performed by: EMERGENCY MEDICINE

## 2021-10-04 PROCEDURE — 85025 COMPLETE CBC W/AUTO DIFF WBC: CPT | Performed by: EMERGENCY MEDICINE

## 2021-10-04 PROCEDURE — 93005 ELECTROCARDIOGRAM TRACING: CPT

## 2021-10-04 PROCEDURE — 84484 ASSAY OF TROPONIN QUANT: CPT | Performed by: EMERGENCY MEDICINE

## 2021-10-04 RX ORDER — METOPROLOL SUCCINATE 25 MG/1
25 TABLET, EXTENDED RELEASE ORAL DAILY
COMMUNITY
Start: 2021-10-01 | End: 2022-10-01

## 2021-10-04 RX ADMIN — SODIUM CHLORIDE 500 ML: 0.9 INJECTION, SOLUTION INTRAVENOUS at 23:59

## 2021-10-05 ENCOUNTER — APPOINTMENT (EMERGENCY)
Dept: RADIOLOGY | Facility: HOSPITAL | Age: 81
End: 2021-10-05
Payer: MEDICARE

## 2021-10-05 ENCOUNTER — TELEPHONE (OUTPATIENT)
Dept: GASTROENTEROLOGY | Facility: CLINIC | Age: 81
End: 2021-10-05

## 2021-10-05 VITALS
WEIGHT: 117.4 LBS | BODY MASS INDEX: 23.05 KG/M2 | OXYGEN SATURATION: 97 % | DIASTOLIC BLOOD PRESSURE: 79 MMHG | HEIGHT: 60 IN | SYSTOLIC BLOOD PRESSURE: 135 MMHG | HEART RATE: 66 BPM | RESPIRATION RATE: 17 BRPM | TEMPERATURE: 98.3 F

## 2021-10-05 PROBLEM — R00.2 PALPITATIONS: Status: RESOLVED | Noted: 2021-10-05 | Resolved: 2021-10-05

## 2021-10-05 PROBLEM — E78.5 DYSLIPIDEMIA: Status: ACTIVE | Noted: 2021-10-05

## 2021-10-05 PROBLEM — R00.2 PALPITATIONS: Status: ACTIVE | Noted: 2021-10-05

## 2021-10-05 LAB
ALBUMIN SERPL BCP-MCNC: 4.2 G/DL (ref 3.5–5)
ALP SERPL-CCNC: 72 U/L (ref 46–116)
ALT SERPL W P-5'-P-CCNC: 31 U/L (ref 12–78)
ANION GAP SERPL CALCULATED.3IONS-SCNC: 12 MMOL/L (ref 4–13)
AST SERPL W P-5'-P-CCNC: 25 U/L (ref 5–45)
ATRIAL RATE: 75 BPM
ATRIAL RATE: 77 BPM
BASOPHILS # BLD AUTO: 0.06 THOUSANDS/ΜL (ref 0–0.1)
BASOPHILS NFR BLD AUTO: 1 % (ref 0–1)
BILIRUB SERPL-MCNC: 0.32 MG/DL (ref 0.2–1)
BILIRUB UR QL STRIP: NEGATIVE
BUN SERPL-MCNC: 19 MG/DL (ref 5–25)
CALCIUM SERPL-MCNC: 9.5 MG/DL (ref 8.3–10.1)
CHLORIDE SERPL-SCNC: 102 MMOL/L (ref 100–108)
CHOLEST SERPL-MCNC: 193 MG/DL (ref 50–200)
CLARITY UR: CLEAR
CO2 SERPL-SCNC: 29 MMOL/L (ref 21–32)
COLOR UR: NORMAL
CREAT SERPL-MCNC: 0.83 MG/DL (ref 0.6–1.3)
EOSINOPHIL # BLD AUTO: 0.07 THOUSAND/ΜL (ref 0–0.61)
EOSINOPHIL NFR BLD AUTO: 1 % (ref 0–6)
ERYTHROCYTE [DISTWIDTH] IN BLOOD BY AUTOMATED COUNT: 13.3 % (ref 11.6–15.1)
GFR SERPL CREATININE-BSD FRML MDRD: 68 ML/MIN/1.73SQ M
GLUCOSE SERPL-MCNC: 112 MG/DL (ref 65–140)
GLUCOSE UR STRIP-MCNC: NEGATIVE MG/DL
HCT VFR BLD AUTO: 44.4 % (ref 34.8–46.1)
HDLC SERPL-MCNC: 68 MG/DL
HGB BLD-MCNC: 14.4 G/DL (ref 11.5–15.4)
HGB UR QL STRIP.AUTO: NEGATIVE
IMM GRANULOCYTES # BLD AUTO: 0.02 THOUSAND/UL (ref 0–0.2)
IMM GRANULOCYTES NFR BLD AUTO: 0 % (ref 0–2)
KETONES UR STRIP-MCNC: NEGATIVE MG/DL
LDLC SERPL CALC-MCNC: 113 MG/DL (ref 0–100)
LEUKOCYTE ESTERASE UR QL STRIP: NEGATIVE
LYMPHOCYTES # BLD AUTO: 2.11 THOUSANDS/ΜL (ref 0.6–4.47)
LYMPHOCYTES NFR BLD AUTO: 25 % (ref 14–44)
MAGNESIUM SERPL-MCNC: 2.3 MG/DL (ref 1.6–2.6)
MCH RBC QN AUTO: 29.8 PG (ref 26.8–34.3)
MCHC RBC AUTO-ENTMCNC: 32.4 G/DL (ref 31.4–37.4)
MCV RBC AUTO: 92 FL (ref 82–98)
MONOCYTES # BLD AUTO: 0.54 THOUSAND/ΜL (ref 0.17–1.22)
MONOCYTES NFR BLD AUTO: 6 % (ref 4–12)
NEUTROPHILS # BLD AUTO: 5.75 THOUSANDS/ΜL (ref 1.85–7.62)
NEUTS SEG NFR BLD AUTO: 67 % (ref 43–75)
NITRITE UR QL STRIP: NEGATIVE
NRBC BLD AUTO-RTO: 0 /100 WBCS
NT-PROBNP SERPL-MCNC: 1050 PG/ML
P AXIS: 60 DEGREES
P AXIS: 63 DEGREES
PH UR STRIP.AUTO: 6 [PH]
PLATELET # BLD AUTO: 233 THOUSANDS/UL (ref 149–390)
PMV BLD AUTO: 11.4 FL (ref 8.9–12.7)
POTASSIUM SERPL-SCNC: 4.2 MMOL/L (ref 3.5–5.3)
PR INTERVAL: 174 MS
PR INTERVAL: 178 MS
PROT SERPL-MCNC: 7.3 G/DL (ref 6.4–8.2)
PROT UR STRIP-MCNC: NEGATIVE MG/DL
QRS AXIS: 32 DEGREES
QRS AXIS: 37 DEGREES
QRSD INTERVAL: 78 MS
QRSD INTERVAL: 78 MS
QT INTERVAL: 400 MS
QT INTERVAL: 534 MS
QTC INTERVAL: 446 MS
QTC INTERVAL: 604 MS
RBC # BLD AUTO: 4.84 MILLION/UL (ref 3.81–5.12)
SODIUM SERPL-SCNC: 143 MMOL/L (ref 136–145)
SP GR UR STRIP.AUTO: <=1.005 (ref 1–1.03)
T WAVE AXIS: 65 DEGREES
T WAVE AXIS: 80 DEGREES
TRIGL SERPL-MCNC: 59 MG/DL
TROPONIN I SERPL-MCNC: 0.02 NG/ML
TROPONIN I SERPL-MCNC: 0.03 NG/ML
TROPONIN I SERPL-MCNC: <0.02 NG/ML
TSH SERPL DL<=0.05 MIU/L-ACNC: 2.38 UIU/ML (ref 0.36–3.74)
UROBILINOGEN UR QL STRIP.AUTO: 0.2 E.U./DL
VENTRICULAR RATE: 75 BPM
VENTRICULAR RATE: 77 BPM
WBC # BLD AUTO: 8.55 THOUSAND/UL (ref 4.31–10.16)

## 2021-10-05 PROCEDURE — 80061 LIPID PANEL: CPT | Performed by: NURSE PRACTITIONER

## 2021-10-05 PROCEDURE — 99236 HOSP IP/OBS SAME DATE HI 85: CPT | Performed by: INTERNAL MEDICINE

## 2021-10-05 PROCEDURE — 84484 ASSAY OF TROPONIN QUANT: CPT | Performed by: NURSE PRACTITIONER

## 2021-10-05 PROCEDURE — 99285 EMERGENCY DEPT VISIT HI MDM: CPT | Performed by: EMERGENCY MEDICINE

## 2021-10-05 PROCEDURE — 84443 ASSAY THYROID STIM HORMONE: CPT | Performed by: NURSE PRACTITIONER

## 2021-10-05 PROCEDURE — 99204 OFFICE O/P NEW MOD 45 MIN: CPT | Performed by: INTERNAL MEDICINE

## 2021-10-05 PROCEDURE — 93010 ELECTROCARDIOGRAM REPORT: CPT | Performed by: INTERNAL MEDICINE

## 2021-10-05 PROCEDURE — 96365 THER/PROPH/DIAG IV INF INIT: CPT

## 2021-10-05 PROCEDURE — 81003 URINALYSIS AUTO W/O SCOPE: CPT | Performed by: EMERGENCY MEDICINE

## 2021-10-05 PROCEDURE — 71045 X-RAY EXAM CHEST 1 VIEW: CPT

## 2021-10-05 RX ORDER — ASPIRIN 81 MG/1
81 TABLET ORAL DAILY
Status: DISCONTINUED | OUTPATIENT
Start: 2021-10-05 | End: 2021-10-05 | Stop reason: HOSPADM

## 2021-10-05 RX ORDER — METOPROLOL SUCCINATE 25 MG/1
25 TABLET, EXTENDED RELEASE ORAL
Status: DISCONTINUED | OUTPATIENT
Start: 2021-10-05 | End: 2021-10-05 | Stop reason: HOSPADM

## 2021-10-05 RX ORDER — FAMOTIDINE 40 MG/1
TABLET, FILM COATED ORAL
Qty: 30 TABLET | Refills: 0
Start: 2021-10-05

## 2021-10-05 RX ORDER — MAGNESIUM SULFATE HEPTAHYDRATE 40 MG/ML
2 INJECTION, SOLUTION INTRAVENOUS ONCE
Status: COMPLETED | OUTPATIENT
Start: 2021-10-05 | End: 2021-10-05

## 2021-10-05 RX ORDER — MELATONIN
1000 DAILY
Status: DISCONTINUED | OUTPATIENT
Start: 2021-10-05 | End: 2021-10-05 | Stop reason: HOSPADM

## 2021-10-05 RX ADMIN — ENOXAPARIN SODIUM 40 MG: 40 INJECTION SUBCUTANEOUS at 08:10

## 2021-10-05 RX ADMIN — Medication 1000 UNITS: at 08:10

## 2021-10-05 RX ADMIN — MAGNESIUM SULFATE HEPTAHYDRATE 2 G: 40 INJECTION, SOLUTION INTRAVENOUS at 00:24

## 2021-10-05 RX ADMIN — ASPIRIN 81 MG: 81 TABLET, COATED ORAL at 08:10

## 2021-10-05 RX ADMIN — CYANOCOBALAMIN TAB 500 MCG 1000 MCG: 500 TAB at 08:10

## 2022-02-02 ENCOUNTER — OFFICE VISIT (OUTPATIENT)
Dept: GASTROENTEROLOGY | Facility: CLINIC | Age: 82
End: 2022-02-02
Payer: MEDICARE

## 2022-02-02 VITALS
SYSTOLIC BLOOD PRESSURE: 161 MMHG | HEIGHT: 60 IN | BODY MASS INDEX: 23.56 KG/M2 | DIASTOLIC BLOOD PRESSURE: 80 MMHG | WEIGHT: 120 LBS | HEART RATE: 73 BPM

## 2022-02-02 DIAGNOSIS — D12.8 ADENOMATOUS RECTAL POLYP: ICD-10-CM

## 2022-02-02 DIAGNOSIS — K59.1 FUNCTIONAL DIARRHEA: Primary | ICD-10-CM

## 2022-02-02 DIAGNOSIS — K21.9 GASTROESOPHAGEAL REFLUX DISEASE WITHOUT ESOPHAGITIS: ICD-10-CM

## 2022-02-02 PROCEDURE — 99214 OFFICE O/P EST MOD 30 MIN: CPT | Performed by: INTERNAL MEDICINE

## 2022-02-02 NOTE — PROGRESS NOTES
[facility] - Outpatient Follow-up Note  Jae Vargas 80 y o  female MRN: [id number]  Encounter: [id number]          ASSESSMENT AND PLAN:      1  Functional diarrhea  Patient had a recent dental work done and prescribed clindamycin, after she started clindamycin, she started having diarrhea, she denies any abdominal pain, no rectal bleeding  She is taking probiotic but stool still remained loose and multiple bowel movements throughout the day  Will order stool for C diff toxin a and B,  - Clostridium difficile toxin by PCR; Future    2  Gastroesophageal reflux disease without esophagitis  Status post upper endoscopy which shows evidence of gastritis with evidence of intestinal metaplasia, she stop taking famotidine and pantoprazole, after endoscopy we started on pantoprazole, after start taking medication, she had episode of palpitation, she was in emergency room and found to have ventricular bigeminy, she was observed for 23 hour and discharged next day, she was seen by cardiologist in [facility], she had echocardiogram which is normal, she is currently taking aspirin only, she stop taking famotidine and pantoprazole, she has on and off heartburn and indigestion problem, she did not want to take any anti-reflux medication, I advised her to take apple cider vinegar as needed and strict anti-reflux diet    3   Adenomatous rectal polyp  Large rectal polyp was removed, patient had a last year colonoscopy, polyp was tubulovillous adenoma in nature, subsequently flex sig sigmoidoscopy was done and residual polyp was removed, no evidence of malignancy, will recommended repeat colonoscopy along with endoscopy in January 2023    ______________________________________________________________________    SUBJECTIVE:  Patient seen and examined, she come for follow-up after upper endoscopy and flexible sigmoidoscopy, flexible sigmoidoscopy shows residual polyp in the rectum which was removed with snare polypectomy, biopsy came back tubular adenoma, no evidence of dysplasia or malignancy  Endoscopy confirmed evidence of mild to moderate degree of gastritis, biopsy shows evidence of intestinal metaplasia, I started on pantoprazole and she was also on famotidine, she had episode of palpitation and she was in emergency room, both medication was discontinued, she had a full cardiac workup which all came back negative      REVIEW OF SYSTEMS IS OTHERWISE NEGATIVE  Historical Information   Past Medical History:   Diagnosis Date    Acid reflux     [address] ([facility] [address] )     is in a cavernous area with an meningioma-followed by MRI (left side), no change since on 2 MRI's- last MRI 2015    Aneurysm ([address] )     brain    Arthritis     fingers    Munoz's esophagus     Bite from insect     had several wasp bites on each arm  7/11/`6    Colon polyp     GERD (gastroesophageal reflux disease)     History of palpitations     Iron disorder     high level- 199, gave i unit of blood, level now may be lower    Irregular heart beat 09/27/2021    new onset of paroxysmal afib, wore holter monitor for 2 weeks, cardiac clearance obtained    Lyme disease     Meningioma ([facility] [address] )     brain    Paroxysmal A-fib ([facility] [address] )     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     Past Surgical History:   Procedure Laterality Date    ADENOIDECTOMY      ADRENALECTOMY Left 2010    ADRENALECTOMY Left 2010    APPENDECTOMY      BUNIONECTOMY Bilateral     CATARACT EXTRACTION Bilateral     COLONOSCOPY      CYSTOSCOPY      ESOPHAGOGASTRODUODENOSCOPY  12/2015    FOOT SURGERY Bilateral     bunionectomy, neuroma removed ezequiel , hammertoe ezequiel   [place] EGD TRANSORAL BIOPSY SINGLE/MULTIPLE N/A 8/9/2017    Procedure: ESOPHAGOGASTRODUODENOSCOPY (EGD); Surgeon: Arleth Irizarry MD;  Location: [facility] GI LAB;   Service: Gastroenterology    DE XCAPSL CTRC RMVL INSJ IO LENS PROSTH W/O ECP Left 7/21/2016    Procedure: EXTRACTION EXTRACAPSULAR CATARACT PHACO INTRAOCULAR LENS (IOL); Surgeon: Keanu Suh MD;  Location: [facility] MAIN OR;  Service: Ophthalmology    MO XCAPSL CTRC RMVL INSJ IO LENS PROSTH W/O ECP Right 6/23/2016    Procedure: EXTRACTION EXTRACAPSULAR CATARACT PHACO INTRAOCULAR LENS (IOL); Surgeon: Keanu Suh MD;  Location: [facility] MAIN OR;  Service: Ophthalmology    TONSILLECTOMY AND ADENOIDECTOMY      TUBAL LIGATION      UPPER GASTROINTESTINAL ENDOSCOPY       Social History   Social History     Substance and Sexual Activity   Alcohol Use Never     Social History     Substance and Sexual Activity   Drug Use No     Social History     Tobacco Use   Smoking Status Never Smoker   Smokeless Tobacco Never Used     Family History   Problem Relation Age of Onset    COPD Mother         age 80    Hypertension Mother     Transient ischemic attack Father     Hypertension Father     Cancer Father 79        possible stomach cancer    Cancer Sister 70        breast    Heart disease Sister         silent MI from chemo       Meds/Allergies       Current Outpatient Medications:     cholecalciferol (VITAMIN D3) 1,000 units tablet    cyanocobalamin (VITAMIN B-12) 100 mcg tablet    MAGNESIUM PO    metoprolol succinate (TOPROL-XL) 25 mg 24 hr tablet    Potassium Gluconate 2 5 MEQ TABS    aspirin (ECOTRIN LOW STRENGTH) 81 mg EC tablet    famotidine (PEPCID) 40 MG tablet    Allergies   Allergen Reactions    Latex      Burning to vaginal area with GYN exam    Pantoprazole Hives    Penicillins Hives    Sulfa Antibiotics Hives    Contrast Dye [Iodinated Diagnostic Agents] Anxiety     Patient was having palpitations and tachycardia           Objective     Blood pressure 161/80, pulse 73, height 5' (1 524 m), weight 54 4 kg (120 lb), not currently breastfeeding  Body mass index is 23 44 kg/m²        PHYSICAL EXAM:      General Appearance:   Alert, cooperative, no distress   HEENT:   Normocephalic, atraumatic, anicteric      Neck:  Supple, symmetrical, trachea midline   Lungs:   Clear to auscultation bilaterally; no rales, rhonchi or wheezing; respirations unlabored    Heart[de-identified]   Regular rate and rhythm; no murmur, rub, or gallop  Abdomen:   Soft, non-tender, non-distended; normal bowel sounds; no masses, no organomegaly    Genitalia:   Deferred    Rectal:   Deferred    Extremities:  No cyanosis, clubbing or edema    Pulses:  2+ and symmetric    Skin:  No jaundice, rashes, or lesions    Lymph nodes:  No palpable cervical lymphadenopathy        Lab Results:   No visits with results within 1 Day(s) from this visit     Latest known visit with results is:   Admission on 10/04/2021, Discharged on 10/05/2021   Component Date Value    WBC 10/04/2021 8 55     RBC 10/04/2021 4 84     Hemoglobin 10/04/2021 14 4     Hematocrit 10/04/2021 44 4     MCV 10/04/2021 92     MCH 10/04/2021 29 8     MCHC 10/04/2021 32 4     RDW 10/04/2021 13 3     MPV 10/04/2021 11 4     Platelets 50/85/7808 233     nRBC 10/04/2021 0     Neutrophils Relative 10/04/2021 67     Immat GRANS % 10/04/2021 0     Lymphocytes Relative 10/04/2021 25     Monocytes Relative 10/04/2021 6     Eosinophils Relative 10/04/2021 1     Basophils Relative 10/04/2021 1     Neutrophils Absolute 10/04/2021 5 75     Immature Grans Absolute 10/04/2021 0 02     Lymphocytes Absolute 10/04/2021 2 11     Monocytes Absolute 10/04/2021 0 54     Eosinophils Absolute 10/04/2021 0 07     Basophils Absolute 10/04/2021 0 06     Sodium 10/04/2021 143     Potassium 10/04/2021 4 2     Chloride 10/04/2021 102     CO2 10/04/2021 29     ANION GAP 10/04/2021 12     BUN 10/04/2021 19     Creatinine 10/04/2021 0 83     Glucose 10/04/2021 112     Calcium 10/04/2021 9 5     AST 10/04/2021 25     ALT 10/04/2021 31     Alkaline Phosphatase 10/04/2021 72     Total Protein 10/04/2021 7 3     Albumin 10/04/2021 4 2     Total Bilirubin 10/04/2021 0 32     eGFR 10/04/2021 68     Magnesium 10/04/2021 2 3     Troponin I 10/04/2021 0 02     NT-proBNP 10/04/2021 1,050*    Color, UA 10/05/2021 Light Yellow     Clarity, UA 10/05/2021 Clear     Specific Gravity, UA 10/05/2021 <=1 005     pH, UA 10/05/2021 6 0     Leukocytes, UA 10/05/2021 Negative     Nitrite, UA 10/05/2021 Negative     Protein, UA 10/05/2021 Negative     Glucose, UA 10/05/2021 Negative     Ketones, UA 10/05/2021 Negative     Urobilinogen, UA 10/05/2021 0 2     Bilirubin, UA 10/05/2021 Negative     Blood, UA 10/05/2021 Negative     TSH 3RD GENERATON 10/05/2021 2 380     Troponin I 10/05/2021 0 03     Cholesterol 10/05/2021 193     Triglycerides 10/05/2021 59     HDL, Direct 10/05/2021 68     LDL Calculated 10/05/2021 113*    Troponin I 10/05/2021 <0 02     Ventricular Rate 10/04/2021 75     Atrial Rate 10/04/2021 75     VT Interval 10/04/2021 174     QRSD Interval 10/04/2021 78     QT Interval 10/04/2021 400     QTC Interval 10/04/2021 446     P Axis 10/04/2021 60     QRS Axis 10/04/2021 37     T Wave Axis 10/04/2021 65     Ventricular Rate 10/04/2021 77     Atrial Rate 10/04/2021 77     VT Interval 10/04/2021 178     QRSD Interval 10/04/2021 78     QT Interval 10/04/2021 534     QTC Interval 10/04/2021 604     P Axis 10/04/2021 63     QRS Axis 10/04/2021 32     T Wave Axis 10/04/2021 80          Radiology Results:   No results found

## 2022-02-03 ENCOUNTER — APPOINTMENT (OUTPATIENT)
Dept: LAB | Facility: CLINIC | Age: 82
End: 2022-02-03
Payer: MEDICARE

## 2022-02-03 ENCOUNTER — TRANSCRIBE ORDERS (OUTPATIENT)
Dept: LAB | Facility: CLINIC | Age: 82
End: 2022-02-03

## 2022-02-03 DIAGNOSIS — E78.5 HYPERLIPIDEMIA, UNSPECIFIED HYPERLIPIDEMIA TYPE: ICD-10-CM

## 2022-02-03 DIAGNOSIS — I48.0 PAROXYSMAL ATRIAL FIBRILLATION (HCC): Primary | ICD-10-CM

## 2022-02-03 DIAGNOSIS — I47.1 SVT (SUPRAVENTRICULAR TACHYCARDIA) (HCC): ICD-10-CM

## 2022-02-03 DIAGNOSIS — K59.1 FUNCTIONAL DIARRHEA: ICD-10-CM

## 2022-02-03 LAB
ALBUMIN SERPL BCP-MCNC: 3.6 G/DL (ref 3.5–5)
ALP SERPL-CCNC: 63 U/L (ref 46–116)
ALT SERPL W P-5'-P-CCNC: 35 U/L (ref 12–78)
ANION GAP SERPL CALCULATED.3IONS-SCNC: 4 MMOL/L (ref 4–13)
AST SERPL W P-5'-P-CCNC: 31 U/L (ref 5–45)
BILIRUB SERPL-MCNC: 2.04 MG/DL (ref 0.2–1)
BUN SERPL-MCNC: 13 MG/DL (ref 5–25)
C DIFF TOX GENS STL QL NAA+PROBE: NEGATIVE
CALCIUM SERPL-MCNC: 9.8 MG/DL (ref 8.3–10.1)
CHLORIDE SERPL-SCNC: 104 MMOL/L (ref 100–108)
CHOLEST SERPL-MCNC: 252 MG/DL
CO2 SERPL-SCNC: 30 MMOL/L (ref 21–32)
CREAT SERPL-MCNC: 0.87 MG/DL (ref 0.6–1.3)
GFR SERPL CREATININE-BSD FRML MDRD: 63 ML/MIN/1.73SQ M
GLUCOSE P FAST SERPL-MCNC: 91 MG/DL (ref 65–99)
HDLC SERPL-MCNC: 47 MG/DL
LDLC SERPL CALC-MCNC: 183 MG/DL (ref 0–100)
NONHDLC SERPL-MCNC: 205 MG/DL
POTASSIUM SERPL-SCNC: 4.6 MMOL/L (ref 3.5–5.3)
PROT SERPL-MCNC: 7.3 G/DL (ref 6.4–8.2)
SODIUM SERPL-SCNC: 138 MMOL/L (ref 136–145)
TRIGL SERPL-MCNC: 110 MG/DL

## 2022-02-03 PROCEDURE — 80061 LIPID PANEL: CPT

## 2022-02-03 PROCEDURE — 36415 COLL VENOUS BLD VENIPUNCTURE: CPT

## 2022-02-03 PROCEDURE — 80053 COMPREHEN METABOLIC PANEL: CPT

## 2022-02-03 PROCEDURE — 87493 C DIFF AMPLIFIED PROBE: CPT

## 2022-04-01 ENCOUNTER — OFFICE VISIT (OUTPATIENT)
Dept: PODIATRY | Facility: CLINIC | Age: 82
End: 2022-04-01
Payer: MEDICARE

## 2022-04-01 VITALS — BODY MASS INDEX: 23.56 KG/M2 | WEIGHT: 120 LBS | HEIGHT: 60 IN | RESPIRATION RATE: 17 BRPM

## 2022-04-01 DIAGNOSIS — M79.672 PAIN IN BOTH FEET: ICD-10-CM

## 2022-04-01 DIAGNOSIS — M79.671 PAIN IN BOTH FEET: ICD-10-CM

## 2022-04-01 DIAGNOSIS — M20.42 HAMMER TOES OF BOTH FEET: Primary | ICD-10-CM

## 2022-04-01 DIAGNOSIS — M89.8X9 BONY EXOSTOSIS: ICD-10-CM

## 2022-04-01 DIAGNOSIS — M20.41 HAMMER TOES OF BOTH FEET: Primary | ICD-10-CM

## 2022-04-01 PROCEDURE — 99212 OFFICE O/P EST SF 10 MIN: CPT | Performed by: PODIATRIST

## 2022-04-01 NOTE — PROGRESS NOTES
Assessment/Plan:  Pain upon ambulation   Deformity of toe   Hammertoe formation   Secondary soft corn of 3rd left interdigital space  Soft corn 2nd right toe      Plan   Foot exam performed   Patient educated on condition   X-rays   Reviewed   Lesions debrided   Patient may need ostectomy  Patient would like to have procedures performed  We would do ostectomy of 2nd right toe as well as ostectomy 4th left toe          Diagnoses and all orders for this visit:     Hammer toe of left foot     Acquired deformity of left foot     Left foot pain     Hammertoe 2nd right  Secondary corn PIPJ            Subjective:  Patient has complaint of pain in the toes of her left  And rightfoot   She has pain upon ambulation   It has been ongoing for several weeks   No history of trauma  Patient has history of prior bunionectomies  Over time she has had increasing pain due to corns                Allergies   Allergen Reactions    Latex         Burning to vaginal area with GYN exam    Penicillins Hives    Sulfa Antibiotics Hives            Current Outpatient Medications:     Cholecalciferol (VITAMIN D3 PO), Take by mouth every morning   Liquid 2000 units, Disp: , Rfl:     co-enzyme Q-10 30 MG capsule, Take 30 mg by mouth daily, Disp: , Rfl:     doxycycline hyclate (VIBRA-TABS) 100 mg tablet, Take 100 mg by mouth 2 (two) times a day, Disp: , Rfl:     ibuprofen (MOTRIN) 200 mg tablet, Take by mouth 4 (four) times a day, Disp: , Rfl:     MAGNESIUM PO, Take 50 mg by mouth every morning , Disp: , Rfl:     Multiple Vitamins-Minerals (HAIR SKIN AND NAILS FORMULA PO), Take by mouth daily, Disp: , Rfl:     Omega-3 Fatty Acids (FISH OIL CONCENTRATE PO), Take by mouth daily, Disp: , Rfl:     pantoprazole (PROTONIX) 40 mg tablet, Take 1 tablet (40 mg total) by mouth daily, Disp: 30 tablet, Rfl: 0           Patient Active Problem List   Diagnosis    Cataract             Patient ID: Barbara VERMA Josh is a 81 y o  female      HPI     The following portions of the patient's history were reviewed and updated as appropriate:      family history includes COPD in her mother; Cancer (age of onset: 70) in her sister; Heart disease in her sister; Hypertension in her father and mother; Transient ischemic attack in her father        reports that she has never smoked  She has never used smokeless tobacco  She reports that she does not drink alcohol or use drugs         Objective:  Patient's shoes and socks removed    Foot Exam     General  General Appearance: appears stated age and healthy   Orientation: alert and oriented to person, place, and time   Affect: appropriate   Gait: antalgic         Right Foot/Ankle      Inspection and Palpation  Ecchymosis: none  Swelling: dorsum   Arch: pes planus  Hallux valgus: yes  Hallux limitus: yes  Skin Exam: callus and dry skin;      Neurovascular  Dorsalis pedis: 2+  Posterior tibial: 2+  Saphenous nerve sensation: normal  Tibial nerve sensation: normal  Superficial peroneal nerve sensation: normal  Deep peroneal nerve sensation: normal  Sural nerve sensation: normal        Left Foot/Ankle       Inspection and Palpation  Ecchymosis: none  Tenderness: bony tenderness   Swelling: dorsum   Arch: pes planus  Hammertoes: second toe, fifth toe, fourth toe and third toe  Hallux valgus: yes  Hallux limitus: yes  Skin Exam: callus and dry skin;      Neurovascular  Dorsalis pedis: 2+  Posterior tibial: 2+  Saphenous nerve sensation: normal  Tibial nerve sensation: normal  Superficial peroneal nerve sensation: normal  Deep peroneal nerve sensation: normal  Sural nerve sensation: normal           Physical Exam  Vitals signs reviewed  Cardiovascular:      Rate and Rhythm: Normal rate and regular rhythm       Pulses:           Dorsalis pedis pulses are 2+ on the right side and 2+ on the left side         Posterior tibial pulses are 2+ on the right side and 2+ on the left side     Musculoskeletal:    Right foot: Bunion present       Left foot: Bony tenderness and bunion present       Comments: Toes are deviating contracted   Left foot demonstrates hammertoe formation  X-ray   Left foot demonstrates enlarged tubercles of the middle phalanx of the 3rd and 4th toe respectively    Feet:      Right foot:      Skin integrity: Callus and dry skin present       Left foot:      Skin integrity: Callus and dry skin present  Skin:     Comments: Patient has soft corn of the 3rd left interdigital space   It is at the level of the PIPJ    Neurological:      Mental Status: She is alert  Psychiatric:         Mood and Affect: Mood normal          BehaviorRose Beckham         Thought Content:  Thought content normal          Judgment: Judgment normal

## 2022-06-02 ENCOUNTER — OFFICE VISIT (OUTPATIENT)
Dept: PODIATRY | Facility: CLINIC | Age: 82
End: 2022-06-02
Payer: MEDICARE

## 2022-06-02 VITALS — RESPIRATION RATE: 17 BRPM | WEIGHT: 120 LBS | HEIGHT: 60 IN | BODY MASS INDEX: 23.56 KG/M2

## 2022-06-02 DIAGNOSIS — M67.471 GANGLION CYST OF RIGHT FOOT: ICD-10-CM

## 2022-06-02 DIAGNOSIS — M89.8X9 BONY EXOSTOSIS: ICD-10-CM

## 2022-06-02 DIAGNOSIS — M20.41 HAMMER TOES OF BOTH FEET: Primary | ICD-10-CM

## 2022-06-02 DIAGNOSIS — M20.42 HAMMER TOES OF BOTH FEET: Primary | ICD-10-CM

## 2022-06-02 DIAGNOSIS — M79.672 PAIN IN BOTH FEET: ICD-10-CM

## 2022-06-02 DIAGNOSIS — D21.22 BENIGN NEOPLASM OF CONNECTIVE AND OTHER SOFT TISSUE OF LEFT LOWER LIMB, INCLUDING HIP: ICD-10-CM

## 2022-06-02 DIAGNOSIS — M79.671 PAIN IN BOTH FEET: ICD-10-CM

## 2022-06-02 PROCEDURE — 99213 OFFICE O/P EST LOW 20 MIN: CPT | Performed by: PODIATRIST

## 2022-06-02 NOTE — PROGRESS NOTES
Assessment/Plan:  Pain upon ambulation   Deformity of toe   Hammertoe formation   Secondary soft corn of 3rd left interdigital space   Soft corn 2nd right toe  Mass right ankle     Plan   Foot exam performed   Patient educated on condition   X-rays   Reviewed  Felix Norwood may need ostectomy  Patient would like to have procedures performed   We would do ostectomy of 2nd right toe as well as ostectomy 4th left toe  In order to treat mass we will treat as a ganglion  Patient use Voltaren Gel  In case of SVT patient be started on low-dose aspirin regimen      Rule out ganglion versus SVT right ankle         Diagnoses and all orders for this visit:     Hammer toe of left foot     Acquired deformity of left foot     Left foot pain     Hammertoe 2nd right   Secondary corn PIPJ    Mass right ankle anterior medial aspect  Rule out ganglion versus SVT            Subjective:  Patient has complaint of pain in the toes of her left  And rightfoot   She has pain upon ambulation   It has been ongoing for several weeks   No history of trauma  Patient has history of prior bunionectomies   Over time she has had increasing pain due to corns                Allergies   Allergen Reactions    Latex         Burning to vaginal area with GYN exam    Penicillins Hives    Sulfa Antibiotics Hives            Current Outpatient Medications:     Cholecalciferol (VITAMIN D3 PO), Take by mouth every morning   Liquid 2000 units, Disp: , Rfl:     co-enzyme Q-10 30 MG capsule, Take 30 mg by mouth daily, Disp: , Rfl:     doxycycline hyclate (VIBRA-TABS) 100 mg tablet, Take 100 mg by mouth 2 (two) times a day, Disp: , Rfl:     ibuprofen (MOTRIN) 200 mg tablet, Take by mouth 4 (four) times a day, Disp: , Rfl:     MAGNESIUM PO, Take 50 mg by mouth every morning , Disp: , Rfl:     Multiple Vitamins-Minerals (HAIR SKIN AND NAILS FORMULA PO), Take by mouth daily, Disp: , Rfl:     Omega-3 Fatty Acids (FISH OIL CONCENTRATE PO), Take by mouth daily, Disp: , Rfl:     pantoprazole (PROTONIX) 40 mg tablet, Take 1 tablet (40 mg total) by mouth daily, Disp: 30 tablet, Rfl: 0           Patient Active Problem List   Diagnosis    Cataract             Patient ID: Barbara Moreno is a 81 y  o  female      HPI     The following portions of the patient's history were reviewed and updated as appropriate:      family history includes COPD in her mother; Cancer (age of onset: 70) in her sister; Heart disease in her sister; Hypertension in her father and mother; Transient ischemic attack in her father        reports that she has never smoked  She has never used smokeless tobacco  She reports that she does not drink alcohol or use drugs         Objective:  Patient's shoes and socks removed    Foot Exam     General  General Appearance: appears stated age and healthy   Orientation: alert and oriented to person, place, and time   Affect: appropriate   Gait: antalgic         Right Foot/Ankle      Inspection and Palpation  Ecchymosis: none  Swelling: dorsum   Arch: pes planus  Hallux valgus: yes  Hallux limitus: yes  Skin Exam: callus and dry skin;      Neurovascular  Dorsalis pedis: 2+  Posterior tibial: 2+  Saphenous nerve sensation: normal  Tibial nerve sensation: normal  Superficial peroneal nerve sensation: normal  Deep peroneal nerve sensation: normal  Sural nerve sensation: normal        Left Foot/Ankle       Inspection and Palpation  Ecchymosis: none  Tenderness: bony tenderness   Swelling: dorsum   Arch: pes planus  Hammertoes: second toe, fifth toe, fourth toe and third toe  Hallux valgus: yes  Hallux limitus: yes  Skin Exam: callus and dry skin;      Neurovascular  Dorsalis pedis: 2+  Posterior tibial: 2+  Saphenous nerve sensation: normal  Tibial nerve sensation: normal  Superficial peroneal nerve sensation: normal  Deep peroneal nerve sensation: normal  Sural nerve sensation: normal           Physical Exam  Vitals signs reviewed  Cardiovascular:      Rate and Rhythm: Normal rate and regular rhythm       Pulses:           Dorsalis pedis pulses are 2+ on the right side and 2+ on the left side         Posterior tibial pulses are 2+ on the right side and 2+ on the left side  Musculoskeletal:      Right foot: Bunion present       Left foot: Bony tenderness and bunion present       Comments: Toes are deviating contracted   Left foot demonstrates hammertoe formation  X-ray   Left foot demonstrates enlarged tubercles of the middle phalanx of the 3rd and 4th toe respectively    Feet:      Right foot:      Skin integrity: Callus and dry skin present       Left foot:      Skin integrity: Callus and dry skin present  Skin:     Comments: Patient has soft corn of the 3rd left interdigital space   It is at the level of the PIPJ    Neurological:      Mental Status: She is alert  Psychiatric:         Mood and Affect: Mood normal          BehaviorKody Maldonado         Thought Content:  Thought content normal          Judgment: Judgment normal

## 2022-06-09 ENCOUNTER — HOSPITAL ENCOUNTER (OUTPATIENT)
Dept: RADIOLOGY | Facility: HOSPITAL | Age: 82
Discharge: HOME | End: 2022-06-09
Attending: PODIATRIST
Payer: MEDICARE

## 2022-06-09 DIAGNOSIS — M67.471 GANGLION CYST OF RIGHT FOOT: ICD-10-CM

## 2022-06-09 DIAGNOSIS — D21.22 BENIGN NEOPLASM OF CONNECTIVE AND OTHER SOFT TISSUE OF LEFT LOWER LIMB, INCLUDING HIP: ICD-10-CM

## 2022-06-09 PROCEDURE — 76882 US LMTD JT/FCL EVL NVASC XTR: CPT

## 2022-06-15 ENCOUNTER — TELEPHONE (OUTPATIENT)
Dept: PODIATRY | Facility: CLINIC | Age: 82
End: 2022-06-15

## 2022-06-15 NOTE — TELEPHONE ENCOUNTER
Patient advised of ultrasound results  She is not having pain at this time  At this time we will take an active surveillance position

## 2022-08-01 ENCOUNTER — APPOINTMENT (OUTPATIENT)
Dept: LAB | Facility: CLINIC | Age: 82
End: 2022-08-01
Payer: MEDICARE

## 2022-08-01 ENCOUNTER — TRANSCRIBE ORDERS (OUTPATIENT)
Dept: LAB | Facility: CLINIC | Age: 82
End: 2022-08-01

## 2022-08-01 DIAGNOSIS — E53.8 BIOTIN-(PROPIONYL-COA-CARBOXYLASE) LIGASE DEFICIENCY: ICD-10-CM

## 2022-08-01 DIAGNOSIS — E63.0 ESSENTIAL FATTY ACID DEFICIENCY: ICD-10-CM

## 2022-08-01 DIAGNOSIS — D89.89 SECONDARY IMMUNE DEFICIENCY DISORDER (HCC): ICD-10-CM

## 2022-08-01 DIAGNOSIS — E71.30 DISORDER OF FATTY ACID METABOLISM: ICD-10-CM

## 2022-08-01 DIAGNOSIS — E83.40 DISORDER OF MAGNESIUM METABOLISM: ICD-10-CM

## 2022-08-01 DIAGNOSIS — R73.03 DIABETES MELLITUS, LATENT: ICD-10-CM

## 2022-08-01 DIAGNOSIS — I48.0 PAROXYSMAL ATRIAL FIBRILLATION (HCC): ICD-10-CM

## 2022-08-01 DIAGNOSIS — E78.5 HYPERLIPIDEMIA, UNSPECIFIED HYPERLIPIDEMIA TYPE: ICD-10-CM

## 2022-08-01 DIAGNOSIS — T56.94XA TOXIC EFFECT OF METAL, UNDETERMINED INTENT, INITIAL ENCOUNTER: ICD-10-CM

## 2022-08-01 DIAGNOSIS — Z20.822 ENCOUNTER FOR SCREENING LABORATORY TESTING FOR SEVERE ACUTE RESPIRATORY SYNDROME CORONAVIRUS 2 (SARS-COV-2): ICD-10-CM

## 2022-08-01 DIAGNOSIS — A69.20 LYME DISEASE: ICD-10-CM

## 2022-08-01 DIAGNOSIS — E78.9 DISORDER OF LIPOPROTEIN AND LIPID METABOLISM: ICD-10-CM

## 2022-08-01 DIAGNOSIS — E55.9 VITAMIN D DEFICIENCY: ICD-10-CM

## 2022-08-01 DIAGNOSIS — G93.32 CHRONIC FATIGUE SYNDROME: Primary | ICD-10-CM

## 2022-08-01 DIAGNOSIS — I47.1 SVT (SUPRAVENTRICULAR TACHYCARDIA) (HCC): ICD-10-CM

## 2022-08-01 DIAGNOSIS — D89.9 DISEASE OF IMMUNE SYSTEM (HCC): ICD-10-CM

## 2022-08-01 DIAGNOSIS — E34.9 ENDOCRINE DISORDER RELATED TO PUBERTY: ICD-10-CM

## 2022-08-01 LAB
BASOPHILS # BLD AUTO: 0.06 THOUSANDS/ΜL (ref 0–0.1)
BASOPHILS NFR BLD AUTO: 1 % (ref 0–1)
CHOLEST SERPL-MCNC: 220 MG/DL
EOSINOPHIL # BLD AUTO: 0.06 THOUSAND/ΜL (ref 0–0.61)
EOSINOPHIL NFR BLD AUTO: 1 % (ref 0–6)
ERYTHROCYTE [DISTWIDTH] IN BLOOD BY AUTOMATED COUNT: 13.2 % (ref 11.6–15.1)
HCT VFR BLD AUTO: 43.8 % (ref 34.8–46.1)
HDLC SERPL-MCNC: 64 MG/DL
HGB BLD-MCNC: 14.2 G/DL (ref 11.5–15.4)
IMM GRANULOCYTES # BLD AUTO: 0.02 THOUSAND/UL (ref 0–0.2)
IMM GRANULOCYTES NFR BLD AUTO: 0 % (ref 0–2)
LDLC SERPL CALC-MCNC: 142 MG/DL (ref 0–100)
LYMPHOCYTES # BLD AUTO: 1.97 THOUSANDS/ΜL (ref 0.6–4.47)
LYMPHOCYTES NFR BLD AUTO: 32 % (ref 14–44)
MCH RBC QN AUTO: 29.6 PG (ref 26.8–34.3)
MCHC RBC AUTO-ENTMCNC: 32.4 G/DL (ref 31.4–37.4)
MCV RBC AUTO: 91 FL (ref 82–98)
MONOCYTES # BLD AUTO: 0.42 THOUSAND/ΜL (ref 0.17–1.22)
MONOCYTES NFR BLD AUTO: 7 % (ref 4–12)
NEUTROPHILS # BLD AUTO: 3.6 THOUSANDS/ΜL (ref 1.85–7.62)
NEUTS SEG NFR BLD AUTO: 59 % (ref 43–75)
NONHDLC SERPL-MCNC: 156 MG/DL
NRBC BLD AUTO-RTO: 0 /100 WBCS
PLATELET # BLD AUTO: 239 THOUSANDS/UL (ref 149–390)
PMV BLD AUTO: 11.6 FL (ref 8.9–12.7)
RBC # BLD AUTO: 4.79 MILLION/UL (ref 3.81–5.12)
SARS-COV-2 IGG SERPL QL IA: NORMAL
SARS-COV-2 IGG+IGM SERPL QL IA: REACTIVE
T3FREE SERPL-MCNC: 2.85 PG/ML (ref 2.3–4.2)
T4 FREE SERPL-MCNC: 0.93 NG/DL (ref 0.76–1.46)
TRIGL SERPL-MCNC: 68 MG/DL
TSH SERPL DL<=0.05 MIU/L-ACNC: 1.5 UIU/ML (ref 0.45–4.5)
VIT B12 SERPL-MCNC: 1972 PG/ML (ref 100–900)
WBC # BLD AUTO: 6.13 THOUSAND/UL (ref 4.31–10.16)

## 2022-08-01 PROCEDURE — 82626 DEHYDROEPIANDROSTERONE: CPT

## 2022-08-01 PROCEDURE — 36415 COLL VENOUS BLD VENIPUNCTURE: CPT

## 2022-08-01 PROCEDURE — 80061 LIPID PANEL: CPT

## 2022-08-01 PROCEDURE — 83036 HEMOGLOBIN GLYCOSYLATED A1C: CPT

## 2022-08-01 PROCEDURE — 86357 NK CELLS TOTAL COUNT: CPT

## 2022-08-01 PROCEDURE — 83735 ASSAY OF MAGNESIUM: CPT

## 2022-08-01 PROCEDURE — 84443 ASSAY THYROID STIM HORMONE: CPT

## 2022-08-01 PROCEDURE — 82607 VITAMIN B-12: CPT

## 2022-08-01 PROCEDURE — 82175 ASSAY OF ARSENIC: CPT

## 2022-08-01 PROCEDURE — 86769 SARS-COV-2 COVID-19 ANTIBODY: CPT

## 2022-08-01 PROCEDURE — 83825 ASSAY OF MERCURY: CPT

## 2022-08-01 PROCEDURE — 83655 ASSAY OF LEAD: CPT

## 2022-08-01 PROCEDURE — 85025 COMPLETE CBC W/AUTO DIFF WBC: CPT

## 2022-08-01 PROCEDURE — 82533 TOTAL CORTISOL: CPT

## 2022-08-01 PROCEDURE — 84439 ASSAY OF FREE THYROXINE: CPT

## 2022-08-01 PROCEDURE — 82306 VITAMIN D 25 HYDROXY: CPT

## 2022-08-01 PROCEDURE — 86038 ANTINUCLEAR ANTIBODIES: CPT

## 2022-08-01 PROCEDURE — 84481 FREE ASSAY (FT-3): CPT

## 2022-08-01 PROCEDURE — 82542 COL CHROMOTOGRAPHY QUAL/QUAN: CPT

## 2022-08-01 PROCEDURE — 86618 LYME DISEASE ANTIBODY: CPT

## 2022-08-01 PROCEDURE — 86617 LYME DISEASE ANTIBODY: CPT

## 2022-08-02 ENCOUNTER — APPOINTMENT (OUTPATIENT)
Dept: LAB | Facility: CLINIC | Age: 82
End: 2022-08-02
Payer: MEDICARE

## 2022-08-02 LAB
25(OH)D3 SERPL-MCNC: 54.9 NG/ML (ref 30–100)
ANA SPECKLED TITR SER: NORMAL {TITER}
ANA TITR SER IF: POSITIVE {TITER}
B BURGDOR IGG PATRN SER IB-IMP: NEGATIVE
B BURGDOR IGG+IGM SER-ACNC: 0.6 AI
B BURGDOR IGM PATRN SER IB-IMP: NEGATIVE
B BURGDOR18KD IGG SER QL IB: ABNORMAL
B BURGDOR23KD IGG SER QL IB: ABNORMAL
B BURGDOR23KD IGM SER QL IB: ABNORMAL
B BURGDOR28KD IGG SER QL IB: ABNORMAL
B BURGDOR30KD IGG SER QL IB: ABNORMAL
B BURGDOR39KD IGG SER QL IB: PRESENT
B BURGDOR39KD IGM SER QL IB: ABNORMAL
B BURGDOR41KD IGG SER QL IB: ABNORMAL
B BURGDOR41KD IGM SER QL IB: PRESENT
B BURGDOR45KD IGG SER QL IB: ABNORMAL
B BURGDOR58KD IGG SER QL IB: ABNORMAL
B BURGDOR66KD IGG SER QL IB: ABNORMAL
B BURGDOR93KD IGG SER QL IB: PRESENT
CORTIS SERPL-MCNC: 17 UG/DL
EST. AVERAGE GLUCOSE BLD GHB EST-MCNC: 120 MG/DL
HBA1C MFR BLD: 5.8 %
SL AMB NOTE:: NORMAL

## 2022-08-03 LAB — MAGNESIUM RBC-MCNC: 6.2 MG/DL (ref 4.2–6.8)

## 2022-08-04 LAB
ARSENIC BLD-MCNC: 3 UG/L (ref 0–9)
DHEA SERPL-MCNC: 163 NG/DL (ref 31–701)
LEAD BLD-MCNC: <1 UG/DL (ref 0–4)
MERCURY BLD-MCNC: 1.2 UG/L (ref 0–14.9)

## 2022-12-09 ENCOUNTER — HOSPITAL ENCOUNTER (OUTPATIENT)
Dept: RADIOLOGY | Facility: HOSPITAL | Age: 82
Discharge: HOME | End: 2022-12-09

## 2022-12-09 DIAGNOSIS — M54.40 ACUTE BILATERAL LOW BACK PAIN WITH SCIATICA, SCIATICA LATERALITY UNSPECIFIED: ICD-10-CM

## 2022-12-09 DIAGNOSIS — M54.2 CERVICALGIA: ICD-10-CM

## 2022-12-16 ENCOUNTER — TELEPHONE (OUTPATIENT)
Dept: GASTROENTEROLOGY | Facility: CLINIC | Age: 82
End: 2022-12-16

## 2022-12-17 NOTE — TELEPHONE ENCOUNTER
Please see procedure done 9/28/21  Please call patient to schedule FLIP - hx of polyps/intestinal metaplasia with Dr Elyse Pablo   thanks

## 2023-01-12 ENCOUNTER — EVALUATION (OUTPATIENT)
Dept: PHYSICAL THERAPY | Facility: CLINIC | Age: 83
End: 2023-01-12

## 2023-01-12 DIAGNOSIS — M25.561 CHRONIC PAIN OF BOTH KNEES: Primary | ICD-10-CM

## 2023-01-12 DIAGNOSIS — M25.562 CHRONIC PAIN OF BOTH KNEES: Primary | ICD-10-CM

## 2023-01-12 DIAGNOSIS — G89.29 CHRONIC PAIN OF BOTH KNEES: Primary | ICD-10-CM

## 2023-01-12 NOTE — LETTER
2023    [address] [phone]    Patient: Kim Xavier   YOB: 1940   Date of Visit: 2023     Encounter Diagnosis     ICD-10-CM    1  Chronic pain of both knees  M25 561     M25 562     G89 29           Dear Dr Maximo Segovia:    Thank you for your recent referral of Kim Xavier  Please review the attached evaluation summary from Barbara's recent visit  Please verify that you agree with the plan of care by signing the attached order  If you have any questions or concerns, please do not hesitate to call  I sincerely appreciate the opportunity to share in the care of one of your patients and hope to have another opportunity to work with you in the near future  Sincerely,    Shar Whitney, PT      Referring Provider:      I certify that I have read the below Plan of Care and certify the need for these services furnished under this plan of treatment while under my care  MD [address] [phone]  Via Fax: [phone]                                                                              PT Evaluation   Today's date: 2023  Patient name: Kim Xavier  : 1940  MRN: [id number]  Referring provider: No ref  provider found  Dx:   Encounter Diagnosis     ICD-10-CM    1  Chronic pain of both knees  M25 561     M25 562     G89 29             Assessment  Assessment details: Patient is a [de-identified] y o  Female who presents with referring diagnosis of OA bilateral knee, chronic knee pain  Patient's greatest concern is worry over not knowing what's wrong, fear of not being able to keep active and future ill health (and wanting to prevent it)  Primary movement impairment diagnosis of strength deficits of LE resulting in pathoanatomical symptoms of pain with palpation, pain with function, impaired balance   The aforementioned impairments have limited the patient's ability to ascend and descend stairs, kneel  No further referral appears necessary at this time based upon examination results    Patient education performed during today's session included: prognosis and diagnosis, HEP and PT expectations    Primary movement impairments:  1) Strength defitics  2) Joint mobility deficits    Etiological factors include: none        Impairments: Abnormal or restricted ROM, Activity intolerance, Impaired balance, Impaired physical strength, Lacks appropriate HEP, Poor body mechanics, Pain with function and Safety issue  Understanding of Dx/Px/POC: Excellent  Prognosis: Good   Positive prognostic factors: positive attitude toward recovery, good understanding of diagnosis and treatment plan options, absence of peripheralization and absence of observed red flags   Negative prognostic factors: chronicity of symptoms and multiple concurrent orthopedic problems    Patient verbalized understanding of POC  Please contact me if you have any questions or recommendations  Thank you for the referral and the opportunity to share in Symone Moreno's care          Plan  Plan details: Joint mobilizations, STM/IASTM, Strengthening, Motor coordination training, Gait training, Stair training, Muscle flexibility, Functional lifting, Balance training and Posture education    Patient would benefit from: skilled physical therapy  Planned modality interventions: Cryotherapy and Thermotherapy: Hydrocollator Packs  Planned therapy interventions: activity modification, joint mobilization, manual therapy, motor coordination training, neuromuscular re-education, patient education, postural training, self care, therapeutic activities, therapeutic exercise, home exercise program, balance, behavior modification and transfer training  Frequency: 2x/week  Duration in weeks: 6  Plan of Care beginning date: 1/12/2023  Plan of Care expiration date: 12 weeks - 4/6/2023  Treatment plan discussed with: Patient       Goals  Short Term Goals (3 weeks - 2023):    - Patient will be independent in basic HEP 2-3 weeks  - Patient will report >50% reduction in pain  - Patient will demonstrate >1/3 improvement in MMT grade as applicable  - Demonstrate consistent posture corrections without cueing during therapeutic exercise    Long Term Goals (6 weeks - 2023):  - Patient will be independent in a comprehensive home exercise program  - Patient FOTO score will improve to 77/100  - Patient will self-report >75% improvement in function  - Patient will stand SL EO 20s  - Patient will kneel  - Patient will descend 14 steps no pain    Provider reviewed insurance verification form with patient, verbalized understanding  Subjective    History of Present Illness  - Mechanism of injury: Rt knee has more sharp pain under the knee cap  Not sure of what movement will irritate it   Does have pain in both knees, she is having the most trouble going down the stairs    - Prior level of function: independently   - Occupation: homemaker      Pain  - Current pain ratin/10  - At best pain ratin/10  - At worst pain rating: 10/10 sharp for a few seconds  - Location: lateral joint line/lateral patella  - Aggravating factors: descending stairs    Social Support  - Steps to enter house: y  - Stairs in house: y   - Bedroom/bathroom set up: y  - Lives with: alone      Objective     Red Flag Screening  - Positive for: age >47 years old    - Negative for: history of cancer, fever, chills, night sweats, weight loss, recent infection, saddle anesthesia, bladder dysfunction and LE neurological deficits      Sensation  - Light touch: intact    LE MMT   2023    LEFT RIGHT   Hip Flexion 5 5   Hip Extension 4- 4-   Hip Abduction 4- 4-        Knee Flexion 4 4   Knee Extension 4 4   *Indicates pain with testing      Hip Range of Motion   2023    LEFT RIGHT   Hip Flexion [facility]   Hip Extension [facility]   Hip Internal Rotation [facility]   Hip External Rotation [facility]   *Indicates pain with testing    Knee Range of Motion   1/12/2023    LEFT RIGHT   Knee Flexion [facility] WFL   Patellar mobility   Fair*    Fair*  Knee Extension [facility] WFL   *Indicates pain with testing      Palpation  LEFT  - Positive: Lateral Joint Line, Medial Joint Line, Popliteal Fossa and Quad Tendon      RIGHT  - Positive: Lateral Joint Line and Lateral Patella  - Negative: Medial Joint Line and Medial Patella    Functional Assessment  -Functional Squat: WNL, no pain  -Stair Negotiation: WNL, no pain with reduced height   -Gait Assessment:  Gait deviations WNL jagdeep and step length  -Balance SLS: <10 sec              Insurance:  AMA/CMS Eval/ Re-eval POC expires Lio Tiwari #/ Referral # Total units  Start date  Expiration date Extension  Visit limitation? PT only or  PT+OT? Co-Insurance   CMS 1/12/2023 12 weeks - 4/6/2023 72                                                                     Patient provided verbal consent to treatment plan and recommended interventions      Date 1/12/2023        Visit Number IE        Manual         PF mobs         LAD                           Neuro Re-Ed         Bridges 3x10        SL hip abduction 2x10        Balance progressions         Tandem walking         Hip burner                                                      TherEx         Clamshells         Squats         Leg press                                             TherAct         Patient education 5' HEP and POC        Lateral step down         Step down                           Gait Training         Step over                           Modalities         CP               Precautions: latex allergy   Past Medical History:   Diagnosis Date   • Acid reflux    • Aneurysm ([address] )     is in a cavernous area with an meningioma-followed by MRI (left side), no change since on 2 MRI's- last MRI 2015   • Aneurysm (HCC)     brain   • Arthritis     fingers   • Munoz's esophagus    • Bite from insect     had several wasp bites on each arm  7/11/`6   • Colon polyp    • GERD (gastroesophageal reflux disease)    • History of palpitations    • Iron disorder     high level- 199, gave i unit of blood, level now may be lower   • Irregular heart beat 09/27/2021    new onset of paroxysmal afib, wore holter monitor for 2 weeks, cardiac clearance obtained   • Lyme disease    • Meningioma [facility])     brain   • Paroxysmal A-fib (HCC)    • 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

## 2023-01-12 NOTE — PROGRESS NOTES
PT Evaluation   Today's date: 2023  Patient name: Jacinta Burkett  : 1940  MRN: [id number]  Referring provider: No ref  provider found  Dx:   Encounter Diagnosis     ICD-10-CM    1  Chronic pain of both knees  M25 561     M25 562     G89 29             Assessment  Assessment details: Patient is a [de-identified] y o  Female who presents with referring diagnosis of OA bilateral knee, chronic knee pain  Patient's greatest concern is worry over not knowing what's wrong, fear of not being able to keep active and future ill health (and wanting to prevent it)  Primary movement impairment diagnosis of strength deficits of LE resulting in pathoanatomical symptoms of pain with palpation, pain with function, impaired balance  The aforementioned impairments have limited the patient's ability to ascend and descend stairs, kneel  No further referral appears necessary at this time based upon examination results    Patient education performed during today's session included: prognosis and diagnosis, HEP and PT expectations    Primary movement impairments:  1) Strength defitics  2) Joint mobility deficits    Etiological factors include: none        Impairments: Abnormal or restricted ROM, Activity intolerance, Impaired balance, Impaired physical strength, Lacks appropriate HEP, Poor body mechanics, Pain with function and Safety issue  Understanding of Dx/Px/POC: Excellent  Prognosis: Good   Positive prognostic factors: positive attitude toward recovery, good understanding of diagnosis and treatment plan options, absence of peripheralization and absence of observed red flags   Negative prognostic factors: chronicity of symptoms and multiple concurrent orthopedic problems    Patient verbalized understanding of POC  Please contact me if you have any questions or recommendations   Thank you for the referral and the opportunity to share in Symone Josh's care  Plan  Plan details: Joint mobilizations, STM/IASTM, Strengthening, Motor coordination training, Gait training, Stair training, Muscle flexibility, Functional lifting, Balance training and Posture education    Patient would benefit from: skilled physical therapy  Planned modality interventions: Cryotherapy and Thermotherapy: Hydrocollator Packs  Planned therapy interventions: activity modification, joint mobilization, manual therapy, motor coordination training, neuromuscular re-education, patient education, postural training, self care, therapeutic activities, therapeutic exercise, home exercise program, balance, behavior modification and transfer training  Frequency: 2x/week  Duration in weeks: 6  Plan of Care beginning date: 2023  Plan of Care expiration date: 12 weeks - 2023  Treatment plan discussed with: Patient       Goals  Short Term Goals (3 weeks - 2023):    - Patient will be independent in basic HEP 2-3 weeks  - Patient will report >50% reduction in pain  - Patient will demonstrate >1/3 improvement in MMT grade as applicable  - Demonstrate consistent posture corrections without cueing during therapeutic exercise    Long Term Goals (6 weeks - 2023):  - Patient will be independent in a comprehensive home exercise program  - Patient FOTO score will improve to 77/100  - Patient will self-report >75% improvement in function  - Patient will stand SL EO 20s  - Patient will kneel  - Patient will descend 14 steps no pain    Provider reviewed insurance verification form with patient, verbalized understanding  Subjective    History of Present Illness  - Mechanism of injury: Rt knee has more sharp pain under the knee cap  Not sure of what movement will irritate it   Does have pain in both knees, she is having the most trouble going down the stairs    - Prior level of function: independently   - Occupation: homemaker      Pain  - Current pain ratin/10  - At best pain ratin/10  - At worst pain rating: 10/10 sharp for a few seconds  - Location: lateral joint line/lateral patella  - Aggravating factors: descending stairs    Social Support  - Steps to enter house: y  - Stairs in house: y   - Bedroom/bathroom set up: y  - Lives with: alone      Objective     Red Flag Screening  - Positive for: age >47 years old    - Negative for: history of cancer, fever, chills, night sweats, weight loss, recent infection, saddle anesthesia, bladder dysfunction and LE neurological deficits      Sensation  - Light touch: intact    LE MMT   2023    LEFT RIGHT   Hip Flexion 5 5   Hip Extension 4- 4-   Hip Abduction 4- 4-        Knee Flexion 4 4   Knee Extension 4 4   *Indicates pain with testing      Hip Range of Motion   2023    LEFT RIGHT   Hip Flexion [facility]   Hip Extension [facility]   Hip Internal Rotation [facility]   Hip External Rotation [facility]   *Indicates pain with testing    Knee Range of Motion   2023    LEFT RIGHT   Knee Flexion [facility]   Patellar mobility   Fair*    Fair*  Knee Extension [facility]   *Indicates pain with testing      Palpation  LEFT  - Positive: Lateral Joint Line, Medial Joint Line, Popliteal Fossa and Quad Tendon      RIGHT  - Positive: Lateral Joint Line and Lateral Patella  - Negative: Medial Joint Line and Medial Patella    Functional Assessment  -Functional Squat: WNL, no pain  -Stair Negotiation: WNL, no pain with reduced height   -Gait Assessment:  Gait deviations WNL jagdeep and step length  -Balance SLS: <10 sec               Insurance:  AMA/CMS Eval/ Re-eval POC expires Sylvia Gardner #/ Referral # Total units  Start date  Expiration date Extension  Visit limitation? PT only or  PT+OT? Co-Insurance   CMS 2023 12 weeks - 2023 72                                                                     Patient provided verbal consent to treatment plan and recommended interventions      Date 2023        Visit Number IE        Manual         PF mobs LAD                           Neuro Re-Ed         Bridges 3x10        SL hip abduction 2x10        Balance progressions         Tandem walking         Hip burner                                                      TherEx         Clamshells         Squats         Leg press                                             TherAct         Patient education 5' HEP and POC        Lateral step down         Step down                           Gait Training         Step over                           Modalities         CP               Precautions: latex allergy   Past Medical History:   Diagnosis Date   • Acid reflux    • Aneurysm (HCC)     is in a cavernous area with an meningioma-followed by MRI (left side), no change since on 2 MRI's- last MRI 2015   • Aneurysm (HCC)     brain   • Arthritis     fingers   • Munzo's esophagus    • Bite from insect     had several wasp bites on each arm  7/11/`6   • Colon polyp    • GERD (gastroesophageal reflux disease)    • History of palpitations    • Iron disorder     high level- 199, gave i unit of blood, level now may be lower   • Irregular heart beat 09/27/2021    new onset of paroxysmal afib, wore holter monitor for 2 weeks, cardiac clearance obtained   • Lyme disease    • Meningioma (HCC)     brain   • Paroxysmal A-fib (HCC)    • 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

## 2023-01-16 ENCOUNTER — OFFICE VISIT (OUTPATIENT)
Dept: PHYSICAL THERAPY | Facility: CLINIC | Age: 83
End: 2023-01-16

## 2023-01-16 DIAGNOSIS — M25.561 CHRONIC PAIN OF BOTH KNEES: Primary | ICD-10-CM

## 2023-01-16 DIAGNOSIS — G89.29 CHRONIC PAIN OF BOTH KNEES: Primary | ICD-10-CM

## 2023-01-16 DIAGNOSIS — M25.562 CHRONIC PAIN OF BOTH KNEES: Primary | ICD-10-CM

## 2023-01-16 NOTE — PROGRESS NOTES
Daily Note     Today's date: 2023  Patient name: Robinson Spencer  : 1940  MRN: [id number]  Referring provider: No ref  provider found  Dx:   Encounter Diagnosis     ICD-10-CM    1  Chronic pain of both knees  M25 561     M25 562     G89 29                      Subjective: Patient reports no change in status since IE      Objective: See treatment diary below      Assessment: Tolerated treatment well  Patient demonstrated fatigue post treatment, exhibited good technique with therapeutic exercises and would benefit from continued PT  Patient demonstrates tendency to hyperextend with weightbearing interventions, cued to remain neutral       Plan: Continue per plan of care  Insurance:  A/CMS Eval/ Re-eval POC expires Magnolia Knjanetjose #/ Referral # Total units  Start date  Expiration date Extension  Visit limitation? PT only or  PT+OT? Co-Insurance   CMS 2023 12 weeks - 2023 72                                                                     Patient provided verbal consent to treatment plan and recommended interventions      Date 2023       Visit Number IE 2       Manual         PF mobs         LAD                           Neuro Re-Ed         Bridges 3x10 3x10       SL hip abduction 2x10 2x15       Balance progressions  SL 2x30s ea       Tandem walking  x4 laps 12'       Hip burner         SLR  2x15       Adductor isometric  x20 5s hold       TKE  x20 gtb       Step up  4" x15 ea                TherEx         Clamshells  x20 supine btb 5s hold       Squats  Chair tap 3x10       Leg press  3x10 65#                                           TherAct         Patient education 5' HEP and POC        Lateral step down  x15 ea 4" step  x15 ea 6" step       Step down                           Gait Training         Step over                           Modalities         CP               Precautions: latex allergy   Past Medical History:   Diagnosis Date   • Acid reflux    • Aneurysm [facility])     is in a cavernous area with an meningioma-followed by MRI (left side), no change since on 2 MRI's- last MRI 2015   • Aneurysm ([address] )     brain   • Arthritis     fingers   • Munoz's esophagus    • Bite from insect     had several wasp bites on each arm  7/11/`6   • Colon polyp    • GERD (gastroesophageal reflux disease)    • History of palpitations    • Iron disorder     high level- 199, gave i unit of blood, level now may be lower   • Irregular heart beat 09/27/2021    new onset of paroxysmal afib, wore holter monitor for 2 weeks, cardiac clearance obtained   • Lyme disease    • Meningioma (HCC)     brain   • Paroxysmal A-fib (HCC)    • 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

## 2023-01-18 ENCOUNTER — OFFICE VISIT (OUTPATIENT)
Dept: PHYSICAL THERAPY | Facility: CLINIC | Age: 83
End: 2023-01-18

## 2023-01-18 DIAGNOSIS — M25.561 CHRONIC PAIN OF BOTH KNEES: Primary | ICD-10-CM

## 2023-01-18 DIAGNOSIS — G89.29 CHRONIC PAIN OF BOTH KNEES: Primary | ICD-10-CM

## 2023-01-18 DIAGNOSIS — M25.562 CHRONIC PAIN OF BOTH KNEES: Primary | ICD-10-CM

## 2023-01-18 NOTE — PROGRESS NOTES
Daily Note     Today's date: 2023  Patient name: Lynda Moran  : 1940  MRN: [id number]  Referring provider: No ref  provider found  Dx:   Encounter Diagnosis     ICD-10-CM    1  Chronic pain of both knees  M25 561     M25 562     G89 29                      Subjective: Patient reports she felt really good after last visit  Denies any soreness  Objective: See treatment diary below      Assessment: Tolerated treatment well  Patient demonstrated fatigue post treatment, exhibited good technique with therapeutic exercises and would benefit from continued PT  Patient demonstrates tendency to hyperextend with weightbearing interventions, cued to remain neutral  Tolerated progressions well with no reports of pain during session  Plan: Continue per plan of care  Insurance:  A/CMS Eval/ Re-eval POC expires Jarvis Villarreal #/ Referral # Total units  Start date  Expiration date Extension  Visit limitation? PT only or  PT+OT? Co-Insurance   CMS 2023 12 weeks - 2023 72                                                                     Patient provided verbal consent to treatment plan and recommended interventions      Date 2023      Visit Number IE 2 3      Manual         PF mobs         LAD   Gr 4 ea 5x30s                        Neuro Re-Ed         Bridges 3x10 3x10 3x10 w march       SL hip abduction 2x10 2x15 Hip 7 way w breaks x10 ea      Balance progressions  SL 2x30s ea SL 2x30s ea      Tandem walking  x4 laps 12' x4 laps 12'      Hip burner         SLR  2x15 2x15 ea      Adductor isometric  x20 5s hold x20 5s hold      TKE  x20 gtb 3x10 7 5# CC      Step up  4" x15 ea 6" 2x15 ea               TherEx         Warm up PRE         Clamshells  x20 supine btb 5s hold See above      Squats  Chair tap 3x10 TRX 3x10      Leg press  3x10 65# 3x10 75#                                          TherAct         Patient education 5' HEP and POC        Lateral step down x15 ea 4" step  x15 ea 6" step 2x15 ea 6" step      Step down                           Gait Training         Step over                           Modalities         CP               Precautions: latex allergy   Past Medical History:   Diagnosis Date   • Acid reflux    • Aneurysm ([address] )     is in a cavernous area with an meningioma-followed by MRI (left side), no change since on 2 MRI's- last MRI 2015   • Aneurysm ([address] )     brain   • Arthritis     fingers   • Munoz's esophagus    • Bite from insect     had several wasp bites on each arm  7/11/`6   • Colon polyp    • GERD (gastroesophageal reflux disease)    • History of palpitations    • Iron disorder     high level- 199, gave i unit of blood, level now may be lower   • Irregular heart beat 09/27/2021    new onset of paroxysmal afib, wore holter monitor for 2 weeks, cardiac clearance obtained   • Lyme disease    • Meningioma (HCC)     brain   • Paroxysmal A-fib (HCC)    • 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

## 2023-01-23 ENCOUNTER — OFFICE VISIT (OUTPATIENT)
Dept: PHYSICAL THERAPY | Facility: CLINIC | Age: 83
End: 2023-01-23

## 2023-01-23 DIAGNOSIS — M25.561 CHRONIC PAIN OF BOTH KNEES: Primary | ICD-10-CM

## 2023-01-23 DIAGNOSIS — M25.562 CHRONIC PAIN OF BOTH KNEES: Primary | ICD-10-CM

## 2023-01-23 DIAGNOSIS — G89.29 CHRONIC PAIN OF BOTH KNEES: Primary | ICD-10-CM

## 2023-01-23 NOTE — PROGRESS NOTES
Daily Note     Today's date: 2023  Patient name: Robinson Spencer  : 1940  MRN: [id number]  Referring provider: No ref  provider found  Dx:   Encounter Diagnosis     ICD-10-CM    1  Chronic pain of both knees  M25 561     M25 562     G89 29                      Subjective: Patient reports no change in status and no soreness after last visit  Objective: See treatment diary below      Assessment: Tolerated treatment well  Patient demonstrated fatigue post treatment, exhibited good technique with therapeutic exercises and would benefit from continued PT  Tolerated progressions well with no reports of pain during session  Better control of hyperextension  Progressed balance with fair performance  Plan: Continue per plan of care  1x/week         Insurance:  AMA/CMS Eval/ Re-eval POC expires Magnolia Grover #/ Referral # Total units  Start date  Expiration date Extension  Visit limitation? PT only or  PT+OT? Co-Insurance   CMS 2023 12 weeks - 2023 72                                                                     Patient provided verbal consent to treatment plan and recommended interventions      Date 2023     Visit Number IE 2 3      Manual         PF mobs         LAD   Gr 4 ea 5x30s Gr 4 ea 5x30s                       Neuro Re-Ed         Bridges 3x10 3x10 3x10 w march  X10; 3x10 w march      SL hip abduction 2x10 2x15 Hip 7 way w breaks x10 ea Hip 7 way x10 ea     Balance progressions  SL 2x30s ea SL 2x30s ea      Tandem walking  x4 laps 12' x4 laps 12' x5 laps 10' + 5' foam     Marching    On ball 2x20 alt     SLR  2x15 2x15 ea 2x15 ea     Adductor isometric  x20 5s hold x20 5s hold x20 5s hold     TKE  x20 gtb 3x10 7 5# CC x30 7 5# CC ea     Step up  4" x15 ea 6" 2x15 ea 8" 2x15 ea              TherEx         Warm up PRE         Clamshells  x20 supine btb 5s hold See above x15     Squats  Chair tap 3x10 TRX 3x10 TRX 3x10     Leg press  3x10 65# 3x10 75# 3x10 85#                                         TherAct         Patient education 5' HEP and POC        Lateral step down  x15 ea 4" step  x15 ea 6" step 2x15 ea 6" step x10 4" step  2x10 6" step     Step down                           Gait Training         Step over                           Modalities         CP               Precautions: latex allergy   Past Medical History:   Diagnosis Date   • Acid reflux    • Aneurysm (HCC)     is in a cavernous area with an meningioma-followed by MRI (left side), no change since on 2 MRI's- last MRI 2015   • Aneurysm (HCC)     brain   • Arthritis     fingers   • Munoz's esophagus    • Bite from insect     had several wasp bites on each arm  7/11/`6   • Colon polyp    • GERD (gastroesophageal reflux disease)    • History of palpitations    • Iron disorder     high level- 199, gave i unit of blood, level now may be lower   • Irregular heart beat 09/27/2021    new onset of paroxysmal afib, wore holter monitor for 2 weeks, cardiac clearance obtained   • Lyme disease    • Meningioma (HCC)     brain   • Paroxysmal A-fib (HCC)    • 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

## 2023-02-15 ENCOUNTER — OFFICE VISIT (OUTPATIENT)
Dept: GASTROENTEROLOGY | Facility: CLINIC | Age: 83
End: 2023-02-15

## 2023-02-15 ENCOUNTER — TELEPHONE (OUTPATIENT)
Dept: GASTROENTEROLOGY | Facility: CLINIC | Age: 83
End: 2023-02-15

## 2023-02-15 VITALS
DIASTOLIC BLOOD PRESSURE: 86 MMHG | WEIGHT: 124 LBS | HEIGHT: 60 IN | HEART RATE: 60 BPM | BODY MASS INDEX: 24.35 KG/M2 | SYSTOLIC BLOOD PRESSURE: 176 MMHG

## 2023-02-15 DIAGNOSIS — D12.8 ADENOMATOUS RECTAL POLYP: Primary | ICD-10-CM

## 2023-02-15 DIAGNOSIS — K31.A11 GASTRIC INTESTINAL METAPLASIA WITHOUT DYSPLASIA, INVOLVING THE ANTRUM: ICD-10-CM

## 2023-02-15 RX ORDER — POLYETHYLENE GLYCOL 3350 17 G/17G
238 POWDER, FOR SOLUTION ORAL ONCE
Qty: 238 G | Refills: 0 | Status: SHIPPED | OUTPATIENT
Start: 2023-02-15 | End: 2023-02-15

## 2023-02-15 NOTE — PROGRESS NOTES
[facility] - Outpatient Follow-up Note  Ortiz Garrido [de-identified] y o  female MRN: [id number]  Encounter: [id number]          ASSESSMENT AND PLAN:      1  Adenomatous rectal polyp  59-year-old female with history of large 4 cm size rectal flat polyp which was removed in piecemeal resection in 2021 now come for follow-up, she currently denying any rectal bleeding, no pain or any constipation  Her bowel movements are regular  She denying any weight loss  She is concerned about recurrence of the polyp  We will schedule for colonoscopy, risk and benefit of the procedure was discussed  She will stay on clear liquid diet day before the procedure, she will drink to 38 g of MiraLAX mixed with the 2 L of Gatorade and split dosing along with Dulcolax tablet  - Colonoscopy; Future  - polyethylene glycol (MiraLax) 17 GM/SCOOP powder; Take 238 g by mouth once for 1 dose Take 238 g my mouth  Use as directed  Dispense: 238 g; Refill: 0    2  Gastric intestinal metaplasia without dysplasia, involving the antrum  History of gastric intestinal metaplasia, currently complained about mild reflux symptoms, she stopped taking famotidine, she denying any nausea or vomiting, will schedule for EGD along with a colonoscopy  - EGD; Future    ______________________________________________________________________    SUBJECTIVE: Patient seen and examined, she come for follow-up  She had last colonoscopy in January 2021 which shows 4 cm size flat sessile polyp in the rectum which was removed with piecemeal resection, subsequently follow-up sigmoidoscopy in September 2021 still shows residual polyp which was removed, biopsy suggest evidence of tubular adenoma, now she come for follow-up, she denying any GI symptoms, no rectal bleeding, no change in bowel habit or abdominal pain  She denying any weight loss, she is non-smoker, she denying any family history of colon cancer    She is concerned about recurrence of the polyp and want to find out whether polyp is gone or not  Her upper GI symptoms also very mild, she stopped using famotidine, she also has a history of gastric intestinal metaplasia and want to schedule EGD and colonoscopy to gather      REVIEW OF SYSTEMS IS OTHERWISE NEGATIVE  Historical Information   Past Medical History:   Diagnosis Date   • Acid reflux    • Aneurysm ([facility] [address] )     is in a cavernous area with an meningioma-followed by MRI (left side), no change since on 2 MRI's- last MRI 2015   • Aneurysm ([facility] )     brain   • Arthritis     fingers   • Munoz's esophagus    • Bite from insect     had several wasp bites on each arm  7/11/`6   • Colon polyp    • GERD (gastroesophageal reflux disease)    • History of palpitations    • Iron disorder     high level- 199, gave i unit of blood, level now may be lower   • Irregular heart beat 09/27/2021    new onset of paroxysmal afib, wore holter monitor for 2 weeks, cardiac clearance obtained   • Lyme disease    • Meningioma (HCC)     brain   • Paroxysmal A-fib ([facility] )    • 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     Past Surgical History:   Procedure Laterality Date   • ADENOIDECTOMY     • ADRENALECTOMY Left 2010   • ADRENALECTOMY Left 2010   • APPENDECTOMY     • BUNIONECTOMY Bilateral    • CATARACT EXTRACTION Bilateral    • COLONOSCOPY     • CYSTOSCOPY     • ESOPHAGOGASTRODUODENOSCOPY  12/2015   • FOOT SURGERY Bilateral     bunionectomy, neuroma removed ezequiel , hammertoe ezequiel  • MT EGD TRANSORAL BIOPSY SINGLE/MULTIPLE N/A 8/9/2017    Procedure: ESOPHAGOGASTRODUODENOSCOPY (EGD); Surgeon: Zeinab Ng MD;  Location: [facility] GI LAB; Service: Gastroenterology   • MT XCAPSL CTRC RMVL INSJ IO LENS PROSTH W/O ECP Left 7/21/2016    Procedure: EXTRACTION EXTRACAPSULAR CATARACT PHACO INTRAOCULAR LENS (IOL);   Surgeon: Clementina Mccoy MD;  Location: [facility] MAIN OR;  Service: Ophthalmology   • MT XCAPSL CTRC RMVL INSJ IO LENS PROSTH W/O ECP Right 6/23/2016    Procedure: EXTRACTION EXTRACAPSULAR CATARACT PHACO INTRAOCULAR LENS (IOL); Surgeon: Racheal Combs MD;  Location: [facility] MAIN OR;  Service: Ophthalmology   • TONSILLECTOMY AND ADENOIDECTOMY     • TUBAL LIGATION     • UPPER GASTROINTESTINAL ENDOSCOPY       Social History   Social History     Substance and Sexual Activity   Alcohol Use Never     Social History     Substance and Sexual Activity   Drug Use No     Social History     Tobacco Use   Smoking Status Never   Smokeless Tobacco Never     Family History   Problem Relation Age of Onset   • COPD Mother         age 80   • Hypertension Mother    • Transient ischemic attack Father    • Hypertension Father    • Cancer Father 79        possible stomach cancer   • Cancer Sister 70        breast   • Heart disease Sister         silent MI from chemo       Meds/Allergies       Current Outpatient Medications:   •  cholecalciferol (VITAMIN D3) 1,000 units tablet  •  cyanocobalamin (VITAMIN B-12) 100 mcg tablet  •  MAGNESIUM PO  •  metoprolol succinate (TOPROL-XL) 25 mg 24 hr tablet  •  polyethylene glycol (MiraLax) 17 GM/SCOOP powder  •  Potassium Gluconate 2 5 MEQ TABS  •  aspirin (ECOTRIN LOW STRENGTH) 81 mg EC tablet  •  famotidine (PEPCID) 40 MG tablet    Allergies   Allergen Reactions   • Latex      Burning to vaginal area with GYN exam   • Pantoprazole Hives   • Penicillins Hives   • Sulfa Antibiotics Hives   • Contrast Dye [Iodinated Contrast Media] Anxiety     Patient was having palpitations and tachycardia           Objective     Blood pressure (!) 176/86, pulse 60, height 5' (1 524 m), weight 56 2 kg (124 lb), not currently breastfeeding  Body mass index is 24 22 kg/m²        PHYSICAL EXAM:      General Appearance:   Alert, cooperative, no distress   HEENT:   Normocephalic, atraumatic, anicteric      Neck:  Supple, symmetrical, trachea midline   Lungs:   Clear to auscultation bilaterally; no rales, rhonchi or wheezing; respirations unlabored    Heart[de-identified]   Regular rate and rhythm; no murmur, rub, or gallop  Abdomen:   Soft, non-tender, non-distended; normal bowel sounds; no masses, no organomegaly    Genitalia:   Deferred    Rectal:   Deferred    Extremities:  No cyanosis, clubbing or edema    Pulses:  2+ and symmetric    Skin:  No jaundice, rashes, or lesions    Lymph nodes:  No palpable cervical lymphadenopathy        Lab Results:   No visits with results within 1 Day(s) from this visit  Latest known visit with results is:   Transcribe Orders on 08/02/2022   Component Date Value   • Sodium 08/02/2022 138    • Potassium 08/02/2022 4 0    • Chloride 08/02/2022 107    • CO2 08/02/2022 29    • ANION GAP 08/02/2022 2 (L)    • BUN 08/02/2022 16    • Creatinine 08/02/2022 0 92    • Glucose, Fasting 08/02/2022 93    • Calcium 08/02/2022 9 6    • AST 08/02/2022 18    • ALT 08/02/2022 20    • Alkaline Phosphatase 08/02/2022 51    • Total Protein 08/02/2022 7 0    • Albumin 08/02/2022 4 0    • Total Bilirubin 08/02/2022 0 86    • eGFR 08/02/2022 59    • Cholesterol 08/02/2022 206 (H)    • Triglycerides 08/02/2022 89    • HDL, Direct 08/02/2022 63    • LDL Calculated 08/02/2022 125 (H)    • Non-HDL-Chol (CHOL-HDL) 08/02/2022 143          Radiology Results:   No results found

## 2023-02-21 ENCOUNTER — TELEPHONE (OUTPATIENT)
Dept: GASTROENTEROLOGY | Facility: CLINIC | Age: 83
End: 2023-02-21

## 2023-02-24 ENCOUNTER — TELEPHONE (OUTPATIENT)
Dept: GASTROENTEROLOGY | Facility: CLINIC | Age: 83
End: 2023-02-24

## 2023-02-24 NOTE — TELEPHONE ENCOUNTER
----- Message from Betty Vickers sent at 2/24/2023 10:22 AM EST -----  Regarding: prep instructions  Please call pt  She has questions regarding prep and instructions to be sent  She is leaving home at noon today  Thank you!

## 2023-02-24 NOTE — TELEPHONE ENCOUNTER
Called and spoke with patient, answered her questions  She has her prep and instructions  Thank you!

## 2023-02-27 ENCOUNTER — TELEPHONE (OUTPATIENT)
Dept: GASTROENTEROLOGY | Facility: CLINIC | Age: 83
End: 2023-02-27

## 2023-02-27 NOTE — TELEPHONE ENCOUNTER
Spoke to pt confirming pt's colonoscopy scheduled on 3/10/23 at [facility] with Dr Melany Fair  Informed [facility] would be calling 1-2 days prior with the arrival time  Pt has instructions and I answered her questions

## 2023-02-28 ENCOUNTER — TELEPHONE (OUTPATIENT)
Dept: GASTROENTEROLOGY | Facility: CLINIC | Age: 83
End: 2023-02-28

## 2023-02-28 NOTE — TELEPHONE ENCOUNTER
Refaxed cardiac clearance today  Will call office if no response by end of wee  Patient is not on blood thinners

## 2023-02-28 NOTE — TELEPHONE ENCOUNTER
Received documentation from Dr Chelsea Farooq  office, patient is cleared for procedure on 3/10/23  Patient notified  Clearance letter sent to [facility]

## 2023-03-09 ENCOUNTER — NURSE TRIAGE (OUTPATIENT)
Dept: OTHER | Facility: OTHER | Age: 83
End: 2023-03-09

## 2023-03-09 NOTE — TELEPHONE ENCOUNTER
Reason for Disposition  • Caller has medicine question only, adult not sick, and triager answers question    Answer Assessment - Initial Assessment Questions  1  NAME of MEDICATION: "What medicine are you calling about?"      Dulcolax 5 mg colonoscopy prep    2  QUESTION: "What is your question?" (e g , medication refill, side effect)      When to take Dulcolax   3  PRESCRIBING HCP: "Who prescribed it?" Reason: if prescribed by specialist, call should be referred to that group        Gastroenterologist    Protocols used: MEDICATION QUESTION CALL-ADULT-[place]

## 2023-03-09 NOTE — TELEPHONE ENCOUNTER
Based on Miralax-Dulcolax instruction, patient was reassured to take Dulcolax 5 mg 2 pills between 1496-9570 toady, then 2 pills at 2000  Patient verbalized understanding of the advice

## 2023-03-09 NOTE — TELEPHONE ENCOUNTER
Regarding: colonoscopy prep question  ----- Message from Unique Mckee sent at 3/9/2023  9:32 AM EST -----  "I have a colonoscopy scheduled for tomorrow and I need to know how much Ducolax I should be taking "

## 2023-03-10 ENCOUNTER — ANESTHESIA EVENT (OUTPATIENT)
Dept: GASTROENTEROLOGY | Facility: AMBULATORY SURGERY CENTER | Age: 83
End: 2023-03-10

## 2023-03-10 ENCOUNTER — ANESTHESIA (OUTPATIENT)
Dept: GASTROENTEROLOGY | Facility: AMBULATORY SURGERY CENTER | Age: 83
End: 2023-03-10

## 2023-03-10 ENCOUNTER — HOSPITAL ENCOUNTER (OUTPATIENT)
Dept: GASTROENTEROLOGY | Facility: AMBULATORY SURGERY CENTER | Age: 83
Discharge: HOME | End: 2023-03-10

## 2023-03-10 VITALS
SYSTOLIC BLOOD PRESSURE: 127 MMHG | HEIGHT: 61 IN | RESPIRATION RATE: 18 BRPM | OXYGEN SATURATION: 98 % | TEMPERATURE: 97.9 F | WEIGHT: 116 LBS | DIASTOLIC BLOOD PRESSURE: 94 MMHG | BODY MASS INDEX: 21.9 KG/M2 | HEART RATE: 57 BPM

## 2023-03-10 DIAGNOSIS — D12.8 ADENOMATOUS RECTAL POLYP: ICD-10-CM

## 2023-03-10 DIAGNOSIS — K31.A11 GASTRIC INTESTINAL METAPLASIA WITHOUT DYSPLASIA, INVOLVING THE ANTRUM: ICD-10-CM

## 2023-03-10 RX ORDER — SODIUM CHLORIDE, SODIUM LACTATE, POTASSIUM CHLORIDE, CALCIUM CHLORIDE 600; 310; 30; 20 MG/100ML; MG/100ML; MG/100ML; MG/100ML
INJECTION, SOLUTION INTRAVENOUS CONTINUOUS PRN
Status: DISCONTINUED | OUTPATIENT
Start: 2023-03-10 | End: 2023-03-10

## 2023-03-10 RX ORDER — PROPOFOL 10 MG/ML
INJECTION, EMULSION INTRAVENOUS AS NEEDED
Status: DISCONTINUED | OUTPATIENT
Start: 2023-03-10 | End: 2023-03-10

## 2023-03-10 RX ORDER — PANTOPRAZOLE SODIUM 40 MG/1
40 TABLET, DELAYED RELEASE ORAL DAILY
Qty: 30 TABLET | Refills: 1 | Status: SHIPPED | OUTPATIENT
Start: 2023-03-10

## 2023-03-10 RX ORDER — SODIUM CHLORIDE, SODIUM LACTATE, POTASSIUM CHLORIDE, CALCIUM CHLORIDE 600; 310; 30; 20 MG/100ML; MG/100ML; MG/100ML; MG/100ML
75 INJECTION, SOLUTION INTRAVENOUS CONTINUOUS
Status: DISCONTINUED | OUTPATIENT
Start: 2023-03-10 | End: 2023-03-14 | Stop reason: HOSPADM

## 2023-03-10 RX ORDER — GLYCOPYRROLATE 0.2 MG/ML
INJECTION INTRAMUSCULAR; INTRAVENOUS AS NEEDED
Status: DISCONTINUED | OUTPATIENT
Start: 2023-03-10 | End: 2023-03-10

## 2023-03-10 RX ORDER — LIDOCAINE HYDROCHLORIDE 10 MG/ML
INJECTION, SOLUTION EPIDURAL; INFILTRATION; INTRACAUDAL; PERINEURAL AS NEEDED
Status: DISCONTINUED | OUTPATIENT
Start: 2023-03-10 | End: 2023-03-10

## 2023-03-10 RX ADMIN — PROPOFOL 20 MG: 10 INJECTION, EMULSION INTRAVENOUS at 10:00

## 2023-03-10 RX ADMIN — PROPOFOL 100 MG: 10 INJECTION, EMULSION INTRAVENOUS at 09:47

## 2023-03-10 RX ADMIN — GLYCOPYRROLATE 0.2 MCG: 0.2 INJECTION INTRAMUSCULAR; INTRAVENOUS at 10:05

## 2023-03-10 RX ADMIN — LIDOCAINE HYDROCHLORIDE 60 MG: 10 INJECTION, SOLUTION EPIDURAL; INFILTRATION; INTRACAUDAL; PERINEURAL at 09:47

## 2023-03-10 RX ADMIN — SODIUM CHLORIDE, SODIUM LACTATE, POTASSIUM CHLORIDE, CALCIUM CHLORIDE: 600; 310; 30; 20 INJECTION, SOLUTION INTRAVENOUS at 09:42

## 2023-03-10 RX ADMIN — PROPOFOL 30 MG: 10 INJECTION, EMULSION INTRAVENOUS at 09:54

## 2023-03-10 RX ADMIN — PROPOFOL 30 MG: 10 INJECTION, EMULSION INTRAVENOUS at 09:55

## 2023-03-10 RX ADMIN — PROPOFOL 30 MG: 10 INJECTION, EMULSION INTRAVENOUS at 10:07

## 2023-03-10 RX ADMIN — PROPOFOL 20 MG: 10 INJECTION, EMULSION INTRAVENOUS at 09:50

## 2023-03-10 NOTE — ANESTHESIA POSTPROCEDURE EVALUATION
Post-Op Assessment Note    CV Status:  Stable    Pain management: adequate     Mental Status:  Sleepy   Hydration Status:  Euvolemic   PONV Controlled:  Controlled   Airway Patency:  Patent      Post Op Vitals Reviewed: Yes      Staff: Anesthesiologist, CRNA         No notable events documented      BP  109/62   Temp      Pulse  57   Resp   13   SpO2   100

## 2023-03-10 NOTE — ANESTHESIA PREPROCEDURE EVALUATION
Procedure:  COLONOSCOPY  EGD    Relevant Problems   CARDIO   (+) Paroxysmal atrial fibrillation (HCC)        Physical Exam    Airway    Mallampati score: III  TM Distance: >3 FB  Neck ROM: full     Dental   Comment: Exposed crown back lower R, No notable dental hx     Cardiovascular  Cardiovascular exam normal    Pulmonary  Pulmonary exam normal     Other Findings    Asymptomatic afib, not on AC  , asymptomatic  Anesthesia Plan  ASA Score- 2     Anesthesia Type- IV sedation with anesthesia with ASA Monitors  Additional Monitors:   Airway Plan:           Plan Factors-Exercise tolerance (METS): >4 METS  Chart reviewed  Patient summary reviewed  Patient is not a current smoker  Induction- intravenous  Postoperative Plan-     Informed Consent- Anesthetic plan and risks discussed with patient

## 2023-03-10 NOTE — H&P
History and Physical - [facility]  Noah Mosher [de-identified] y o  female MRN: [id number]                  HPI: Noah Mosher is a [de-identified]y o  year old female who presents for history of gastric intestinal metaplasia, history of colon polyps      REVIEW OF SYSTEMS: Per the HPI, and otherwise unremarkable  Historical Information   Past Medical History:   Diagnosis Date   • Acid reflux    • Aneurysm ([address] )     is in a cavernous area with an meningioma-followed by MRI (left side), no change since on 2 MRI's- last MRI 2015   • Aneurysm (HCC)     brain   • Arthritis     fingers   • Munoz's esophagus    • Bite from insect     had several wasp bites on each arm  7/11/`6   • Colon polyp    • Eczema     fingers   • Gastric dysplasia    • GERD (gastroesophageal reflux disease)    • History of Lyme disease    • History of palpitations    • Hypertension    • Iron disorder     high level- 199, gave i unit of blood, level now may be lower   • Irregular heart beat 09/27/2021    new onset of paroxysmal afib, wore holter monitor for 2 weeks, 2nd monitoring done last year- no problems at that time per patient  • Lyme disease    • Meningioma [facility])     brain   • Paroxysmal A-fib [facility])    • 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     Past Surgical History:   Procedure Laterality Date   • ADENOIDECTOMY     • ADRENALECTOMY Left 2010   • APPENDECTOMY     • BUNIONECTOMY Bilateral    • CATARACT EXTRACTION Bilateral    • COLONOSCOPY     • CYSTOSCOPY     • ESOPHAGOGASTRODUODENOSCOPY  12/2015   • FOOT SURGERY Bilateral     bunionectomy, neuroma removed ezequiel , hammertoe ezequiel  • CA EGD TRANSORAL BIOPSY SINGLE/MULTIPLE N/A 08/09/2017    Procedure: ESOPHAGOGASTRODUODENOSCOPY (EGD); Surgeon: Constance Altamirano MD;  Location: [facility] GI LAB;   Service: Gastroenterology   • CA XCAPSL CTRC RMVL INSJ IO LENS PROSTH W/O ECP Left 07/21/2016    Procedure: EXTRACTION EXTRACAPSULAR CATARACT PHACO INTRAOCULAR LENS (IOL); Surgeon: Dawn Park MD;  Location: [facility] MAIN OR;  Service: Ophthalmology   • [place] XCAPSL CTRC RMVL INSJ IO LENS PROSTH W/O ECP Right 06/23/2016    Procedure: EXTRACTION EXTRACAPSULAR CATARACT PHACO INTRAOCULAR LENS (IOL); Surgeon: Dawn Park MD;  Location: [facility] MAIN OR;  Service: Ophthalmology   • TONSILLECTOMY AND ADENOIDECTOMY     • TUBAL LIGATION     • UPPER GASTROINTESTINAL ENDOSCOPY       Social History   Social History     Substance and Sexual Activity   Alcohol Use Never     Social History     Substance and Sexual Activity   Drug Use No     Social History     Tobacco Use   Smoking Status Never   Smokeless Tobacco Never     Family History   Problem Relation Age of Onset   • COPD Mother         age 80   • Hypertension Mother    • Transient ischemic attack Father    • Hypertension Father    • Cancer Father 79        possible stomach cancer   • Cancer Sister 70        breast   • Heart disease Sister         silent MI from chemo       Meds/Allergies     (Not in a hospital admission)      Allergies   Allergen Reactions   • Latex      Burning to vaginal area with GYN exam   • Pantoprazole Hives   • Penicillins Hives   • Sulfa Antibiotics Hives   • Contrast Dye [Iodinated Contrast Media] Anxiety     Patient was having palpitations and tachycardia       Objective     BP (!) 192/94   Pulse 74   Temp 97 9 °F (36 6 °C) (Skin)   Resp 18   Ht 5' 0 5" (1 537 m)   Wt 52 6 kg (116 lb)   SpO2 100%   BMI 22 28 kg/m²       PHYSICAL EXAM    Gen: NAD  CV: RRR  CHEST: Clear  ABD: soft, NT/ND  EXT: no edema      ASSESSMENT/PLAN:  This is a [de-identified]y o  year old female here for EGD and colonoscopy, and she is stable and optimized for her procedure

## 2023-03-29 ENCOUNTER — TELEPHONE (OUTPATIENT)
Dept: GASTROENTEROLOGY | Facility: AMBULARY SURGERY CENTER | Age: 83
End: 2023-03-29

## 2023-03-29 NOTE — TELEPHONE ENCOUNTER
Patients GI provider:  Dr Jayro Cordova    Number to return call: (  [phone]    Reason for call: Pt calling to schedule follow up to discuss biopsy results, received message from the office    Scheduled procedure/appointment date applicable: Appt 6/3/95

## 2023-05-05 ENCOUNTER — OFFICE VISIT (OUTPATIENT)
Dept: GASTROENTEROLOGY | Facility: CLINIC | Age: 83
End: 2023-05-05

## 2023-05-05 VITALS
HEART RATE: 58 BPM | SYSTOLIC BLOOD PRESSURE: 166 MMHG | WEIGHT: 121 LBS | BODY MASS INDEX: 22.84 KG/M2 | DIASTOLIC BLOOD PRESSURE: 90 MMHG | HEIGHT: 61 IN

## 2023-05-05 DIAGNOSIS — D37.5 VILLOUS ADENOMA POLYP OF RECTUM: Primary | ICD-10-CM

## 2023-05-05 DIAGNOSIS — K22.70 BARRETT'S ESOPHAGUS WITHOUT DYSPLASIA: ICD-10-CM

## 2023-05-05 DIAGNOSIS — K31.A11 INTESTINAL METAPLASIA OF ANTRUM OF STOMACH WITHOUT DYSPLASIA: ICD-10-CM

## 2023-05-05 NOTE — PROGRESS NOTES
[facility] - Outpatient Follow-up Note  Byalee Rendon [de-identified] y o  female MRN: [id number]  Encounter: [id number]          ASSESSMENT AND PLAN:      1  Villous adenoma polyp of rectum  68-year-old female with history of large villous adenoma in the rectum which was removed in piecemeal resection during colonoscopy now come for follow-up  Postprocedure she is doing well, she denying any bleeding or abdominal pain  I discussed about biopsy report, ascending colon polyp is tubular adenoma rectal polyp is residual villous adenoma without dysplasia, will continue surveillance colonoscopy in a year because of the piecemeal resection of the polyp  She had a multiple question about colonoscopy and bowel prep which are answered, repeat colonoscopy will be in March 2024    2  Intestinal metaplasia of antrum of stomach without dysplasia  EGD with biopsy result was reviewed with the patient which shows evidence of chronic intestinal metaplasia without dysplasia, will try famotidine which she did not tolerate because of the side effect and then Protonix which she also did not tolerate, she denying any heartburn or reflux symptoms, will continue with endoscopic surveillance, will do repeat EGD and colonoscopy to gather in March 2024    3   Munoz's esophagus without dysplasia  New onset of Munoz esophagus without dysplasia, no reflux symptoms, unable to tolerate pantoprazole or famotidine, advised to continue endoscopic surveillance    ______________________________________________________________________    SUBJECTIVE: Patient seen and examined, she come for follow-up, she had a EGD and colonoscopy now come for follow-up, overall she is doing well, EGD shows short segment Munoz's esophagus without dysplasia, gastric biopsy confirm evidence of intestinal metaplasia without dysplasia, no evidence of H  pylori infection in the colonoscopy, rectal polyp recurred and biopsy suggest evidence of villous adenoma, there was another ascending colon polyp which was removed, biopsy suggest evidence of tubular adenoma  She consume good amount of fiber in the diet, she denying any family history of colon cancer, she denying any nausea, no vomiting, no change in bowel habit, her weight remains stable      REVIEW OF SYSTEMS IS OTHERWISE NEGATIVE  Historical Information   Past Medical History:   Diagnosis Date    Acid reflux     [address] [address]     is in a cavernous area with an meningioma-followed by MRI (left side), no change since on 2 MRI's- last MRI 2015    Aneurysm ([facility] [address] )     brain    Arthritis     fingers    Munoz's esophagus     Bite from insect     had several wasp bites on each arm  7/11/`6    Colon polyp     Eczema     fingers    Gastric dysplasia     GERD (gastroesophageal reflux disease)     History of Lyme disease     History of palpitations     Hypertension     Iron disorder     high level- 199, gave i unit of blood, level now may be lower    Irregular heart beat 09/27/2021    new onset of paroxysmal afib, wore holter monitor for 2 weeks, 2nd monitoring done last year- no problems at that time per patient   Lyme disease     Meningioma ([facility] [address] )     brain    Paroxysmal A-fib ([facility] [address] )     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     Past Surgical History:   Procedure Laterality Date    ADENOIDECTOMY      ADRENALECTOMY Left 2010    APPENDECTOMY      BUNIONECTOMY Bilateral     CATARACT EXTRACTION Bilateral     COLONOSCOPY      CYSTOSCOPY      ESOPHAGOGASTRODUODENOSCOPY  12/2015    FOOT SURGERY Bilateral     bunionectomy, neuroma removed ezequiel , hammertoe ezequiel   MA EGD TRANSORAL BIOPSY SINGLE/MULTIPLE N/A 08/09/2017    Procedure: ESOPHAGOGASTRODUODENOSCOPY (EGD); Surgeon: Edmund Moctezuma MD;  Location: [facility] GI LAB;   Service: Gastroenterology    MA XCAPSL CTRC RMVL INSJ IO LENS PROSTH W/O ECP Left 07/21/2016    Procedure: EXTRACTION EXTRACAPSULAR CATARACT PHACO "INTRAOCULAR LENS (IOL); Surgeon: Christina Elaine MD;  Location: [facility] MAIN OR;  Service: Ophthalmology    [place] XCAPSL CTRC RMVL INSJ IO LENS PROSTH W/O ECP Right 06/23/2016    Procedure: EXTRACTION EXTRACAPSULAR CATARACT PHACO INTRAOCULAR LENS (IOL); Surgeon: Christina Elaine MD;  Location: [facility] MAIN OR;  Service: Ophthalmology    TONSILLECTOMY AND ADENOIDECTOMY      TUBAL LIGATION      UPPER GASTROINTESTINAL ENDOSCOPY       Social History   Social History     Substance and Sexual Activity   Alcohol Use Never     Social History     Substance and Sexual Activity   Drug Use No     Social History     Tobacco Use   Smoking Status Never   Smokeless Tobacco Never     Family History   Problem Relation Age of Onset    COPD Mother         age 80    Hypertension Mother     Transient ischemic attack Father     Hypertension Father     Cancer Father 79        possible stomach cancer    Cancer Sister 70        breast    Heart disease Sister         silent MI from chemo       Meds/Allergies       Current Outpatient Medications:     cholecalciferol (VITAMIN D3) 1,000 units tablet    cyanocobalamin (VITAMIN B-12) 100 mcg tablet    MAGNESIUM PO    Potassium Gluconate 2 5 MEQ TABS    metoprolol succinate (TOPROL-XL) 25 mg 24 hr tablet    Allergies   Allergen Reactions    Latex      Burning to vaginal area with GYN exam    Pantoprazole Hives    Penicillins Hives    Sulfa Antibiotics Hives    Contrast Dye [Iodinated Contrast Media] Anxiety     Patient was having palpitations and tachycardia           Objective     Blood pressure 166/90, pulse 58, height 5' 0 5\" (1 537 m), weight 54 9 kg (121 lb), not currently breastfeeding  Body mass index is 23 24 kg/m²        PHYSICAL EXAM:      General Appearance:   Alert, cooperative, no distress   HEENT:   Normocephalic, atraumatic, anicteric      Neck:  Supple, symmetrical, trachea midline   Lungs:   Clear to auscultation bilaterally; no rales, rhonchi or wheezing; respirations " unlabored    Heart[de-identified]   Regular rate and rhythm; no murmur, rub, or gallop  Abdomen:   Soft, non-tender, non-distended; normal bowel sounds; no masses, no organomegaly    Genitalia:   Deferred    Rectal:   Deferred    Extremities:  No cyanosis, clubbing or edema    Pulses:  2+ and symmetric    Skin:  No jaundice, rashes, or lesions    Lymph nodes:  No palpable cervical lymphadenopathy        Lab Results:   No visits with results within 1 Day(s) from this visit  Latest known visit with results is:   Hospital Outpatient Visit on 03/10/2023   Component Date Value    Case Report 03/10/2023                      Value:Surgical Pathology Report                         Case: [id number]                                   Authorizing Provider:  Yaquelin Barcenas MD Collected:           03/10/2023 0954              Ordering Location:     [address] [place] [address] Received:            03/13/2023 [address]                                                                  Pathologist:           Tanya Carballo MD                                                                           Specimens:   A) - Stomach, cold bx antrum hx of intestinal metaplasia                                            B) - Esophagus, cold bx lower esophagus reflux                                                      C) - Large Intestine, Right/Ascending Colon, cold snare ascending polyp                             D) - Rectum, cold/hot snare rectum polyp                                                   Final Diagnosis 03/10/2023                      Value: This result contains rich text formatting which cannot be displayed here   Additional Information 03/10/2023                      Value: This result contains rich text formatting which cannot be displayed here      Synoptic Checklist 03/10/2023 Value:                            COLON/RECTUM POLYP FORM - GI - C, D                                                                                     :    Adenoma(s)      Gross Description 03/10/2023                      Value: This result contains rich text formatting which cannot be displayed here   Clinical Information 03/10/2023                      Value:[de-identified]year old female with a history of gastric intestinal metaplasia ([id number]) and colon polyps  EGD revealed moderate, localized erythematous and nodular mucosa in the antrum  Colonoscopy revealed two sessile, adenomatous-appearing polyps measuring 10-19 mm in the ascending colon and rectum  Radiology Results:   No results found

## 2024-01-09 ENCOUNTER — TRANSCRIBE ORDERS (OUTPATIENT)
Dept: LAB | Facility: CLINIC | Age: 84
End: 2024-01-09

## 2024-01-09 ENCOUNTER — APPOINTMENT (OUTPATIENT)
Dept: LAB | Facility: CLINIC | Age: 84
End: 2024-01-09
Payer: MEDICARE

## 2024-01-09 DIAGNOSIS — D89.89 RADIATION CHIMERA (HCC): ICD-10-CM

## 2024-01-09 DIAGNOSIS — E01.8 IODINE HYPOTHYROIDISM: ICD-10-CM

## 2024-01-09 DIAGNOSIS — E61.0 HYPOCUPREMIA: ICD-10-CM

## 2024-01-09 DIAGNOSIS — R79.1 ABNORMAL COAGULATION PROFILE: ICD-10-CM

## 2024-01-09 DIAGNOSIS — E78.5 HYPERLIPIDEMIA, UNSPECIFIED HYPERLIPIDEMIA TYPE: ICD-10-CM

## 2024-01-09 DIAGNOSIS — E34.9 ENDOCRINE PROBLEM: ICD-10-CM

## 2024-01-09 DIAGNOSIS — R73.03 DIABETES MELLITUS, LATENT: ICD-10-CM

## 2024-01-09 DIAGNOSIS — E83.40 DISORDER OF MAGNESIUM METABOLISM: ICD-10-CM

## 2024-01-09 DIAGNOSIS — E63.0 ESSENTIAL FATTY ACID DEFICIENCY: ICD-10-CM

## 2024-01-09 DIAGNOSIS — D89.9 DISEASE OF IMMUNE SYSTEM (HCC): ICD-10-CM

## 2024-01-09 DIAGNOSIS — D89.9 DISEASE OF IMMUNE SYSTEM (HCC): Primary | ICD-10-CM

## 2024-01-09 LAB
ALBUMIN SERPL BCP-MCNC: 4.9 G/DL (ref 3.5–5)
ALP SERPL-CCNC: 71 U/L (ref 34–104)
ALT SERPL W P-5'-P-CCNC: 22 U/L (ref 7–52)
ANA SER QL IA: NEGATIVE
ANION GAP SERPL CALCULATED.3IONS-SCNC: 12 MMOL/L
AST SERPL W P-5'-P-CCNC: 30 U/L (ref 13–39)
BASOPHILS # BLD AUTO: 0.03 THOUSANDS/ÂΜL (ref 0–0.1)
BASOPHILS NFR BLD AUTO: 1 % (ref 0–1)
BILIRUB DIRECT SERPL-MCNC: 0.14 MG/DL (ref 0–0.2)
BILIRUB SERPL-MCNC: 0.79 MG/DL (ref 0.2–1)
BUN SERPL-MCNC: 15 MG/DL (ref 5–25)
CALCIUM SERPL-MCNC: 10.1 MG/DL (ref 8.4–10.2)
CHLORIDE SERPL-SCNC: 100 MMOL/L (ref 96–108)
CHOLEST SERPL-MCNC: 270 MG/DL
CO2 SERPL-SCNC: 29 MMOL/L (ref 21–32)
CREAT SERPL-MCNC: 0.69 MG/DL (ref 0.6–1.3)
D DIMER PPP FEU-MCNC: 0.31 UG/ML FEU
EOSINOPHIL # BLD AUTO: 0.08 THOUSAND/ÂΜL (ref 0–0.61)
EOSINOPHIL NFR BLD AUTO: 1 % (ref 0–6)
ERYTHROCYTE [DISTWIDTH] IN BLOOD BY AUTOMATED COUNT: 13.2 % (ref 11.6–15.1)
ESTRADIOL SERPL-MCNC: 16.7 PG/ML
GFR SERPL CREATININE-BSD FRML MDRD: 81 ML/MIN/1.73SQ M
GLUCOSE P FAST SERPL-MCNC: 88 MG/DL (ref 65–99)
HCT VFR BLD AUTO: 46.8 % (ref 34.8–46.1)
HDLC SERPL-MCNC: 70 MG/DL
HGB BLD-MCNC: 15 G/DL (ref 11.5–15.4)
IMM GRANULOCYTES # BLD AUTO: 0.02 THOUSAND/UL (ref 0–0.2)
IMM GRANULOCYTES NFR BLD AUTO: 0 % (ref 0–2)
LDLC SERPL CALC-MCNC: 178 MG/DL (ref 0–100)
LYMPHOCYTES # BLD AUTO: 2.15 THOUSANDS/ÂΜL (ref 0.6–4.47)
LYMPHOCYTES NFR BLD AUTO: 36 % (ref 14–44)
MAGNESIUM SERPL-MCNC: 2.2 MG/DL (ref 1.9–2.7)
MCH RBC QN AUTO: 29.9 PG (ref 26.8–34.3)
MCHC RBC AUTO-ENTMCNC: 32.1 G/DL (ref 31.4–37.4)
MCV RBC AUTO: 93 FL (ref 82–98)
MONOCYTES # BLD AUTO: 0.32 THOUSAND/ÂΜL (ref 0.17–1.22)
MONOCYTES NFR BLD AUTO: 5 % (ref 4–12)
NEUTROPHILS # BLD AUTO: 3.36 THOUSANDS/ÂΜL (ref 1.85–7.62)
NEUTS SEG NFR BLD AUTO: 57 % (ref 43–75)
NONHDLC SERPL-MCNC: 200 MG/DL
NRBC BLD AUTO-RTO: 0 /100 WBCS
PLATELET # BLD AUTO: 298 THOUSANDS/UL (ref 149–390)
PMV BLD AUTO: 11.2 FL (ref 8.9–12.7)
POTASSIUM SERPL-SCNC: 5.1 MMOL/L (ref 3.5–5.3)
PROGEST SERPL-MCNC: 0.11 NG/ML
PROT SERPL-MCNC: 7.5 G/DL (ref 6.4–8.4)
RBC # BLD AUTO: 5.02 MILLION/UL (ref 3.81–5.12)
SODIUM SERPL-SCNC: 141 MMOL/L (ref 135–147)
T4 FREE SERPL-MCNC: 0.88 NG/DL (ref 0.61–1.12)
TESTOST SERPL-MSCNC: 75 NG/DL
TRIGL SERPL-MCNC: 109 MG/DL
TSH SERPL DL<=0.05 MIU/L-ACNC: 2.02 UIU/ML (ref 0.45–4.5)
WBC # BLD AUTO: 5.96 THOUSAND/UL (ref 4.31–10.16)

## 2024-01-09 PROCEDURE — 80076 HEPATIC FUNCTION PANEL: CPT

## 2024-01-09 PROCEDURE — 82542 COL CHROMOTOGRAPHY QUAL/QUAN: CPT

## 2024-01-09 PROCEDURE — 85025 COMPLETE CBC W/AUTO DIFF WBC: CPT

## 2024-01-09 PROCEDURE — 84255 ASSAY OF SELENIUM: CPT

## 2024-01-09 PROCEDURE — 80061 LIPID PANEL: CPT

## 2024-01-09 PROCEDURE — 84443 ASSAY THYROID STIM HORMONE: CPT

## 2024-01-09 PROCEDURE — 84403 ASSAY OF TOTAL TESTOSTERONE: CPT

## 2024-01-09 PROCEDURE — 86357 NK CELLS TOTAL COUNT: CPT

## 2024-01-09 PROCEDURE — 82627 DEHYDROEPIANDROSTERONE: CPT

## 2024-01-09 PROCEDURE — 80048 BASIC METABOLIC PNL TOTAL CA: CPT

## 2024-01-09 PROCEDURE — 82525 ASSAY OF COPPER: CPT

## 2024-01-09 PROCEDURE — 84630 ASSAY OF ZINC: CPT

## 2024-01-09 PROCEDURE — 85379 FIBRIN DEGRADATION QUANT: CPT

## 2024-01-09 PROCEDURE — 84439 ASSAY OF FREE THYROXINE: CPT

## 2024-01-09 PROCEDURE — 36415 COLL VENOUS BLD VENIPUNCTURE: CPT

## 2024-01-09 PROCEDURE — 86038 ANTINUCLEAR ANTIBODIES: CPT

## 2024-01-09 PROCEDURE — 82670 ASSAY OF TOTAL ESTRADIOL: CPT

## 2024-01-09 PROCEDURE — 83789 MASS SPECTROMETRY QUAL/QUAN: CPT

## 2024-01-09 PROCEDURE — 84144 ASSAY OF PROGESTERONE: CPT

## 2024-01-09 PROCEDURE — 83735 ASSAY OF MAGNESIUM: CPT

## 2024-01-10 LAB — DHEA-S SERPL-MCNC: 127 UG/DL (ref 13.9–142.8)

## 2024-01-11 LAB
COPPER SERPL-MCNC: 139 UG/DL (ref 80–158)
IODINE SERPL-MCNC: 49.6 UG/L (ref 40–92)
ZINC SERPL-MCNC: 101 UG/DL (ref 44–115)

## 2024-01-12 LAB — SELENIUM SERPL-MCNC: 132 UG/L (ref 93–198)

## 2024-01-15 LAB — MISCELLANEOUS LAB TEST RESULT: NORMAL

## 2024-01-19 LAB — MISCELLANEOUS LAB TEST RESULT: NORMAL

## 2024-02-12 ENCOUNTER — TELEPHONE (OUTPATIENT)
Dept: GASTROENTEROLOGY | Facility: CLINIC | Age: 84
End: 2024-02-12

## 2024-02-12 ENCOUNTER — OFFICE VISIT (OUTPATIENT)
Dept: GASTROENTEROLOGY | Facility: CLINIC | Age: 84
End: 2024-02-12
Payer: MEDICARE

## 2024-02-12 VITALS
DIASTOLIC BLOOD PRESSURE: 102 MMHG | WEIGHT: 110 LBS | HEART RATE: 73 BPM | SYSTOLIC BLOOD PRESSURE: 194 MMHG | BODY MASS INDEX: 21.6 KG/M2 | HEIGHT: 60 IN

## 2024-02-12 DIAGNOSIS — D37.5 VILLOUS ADENOMA POLYP OF RECTUM: Primary | ICD-10-CM

## 2024-02-12 PROCEDURE — 99213 OFFICE O/P EST LOW 20 MIN: CPT | Performed by: INTERNAL MEDICINE

## 2024-02-12 RX ORDER — POLYETHYLENE GLYCOL 3350, SODIUM CHLORIDE, SODIUM BICARBONATE, POTASSIUM CHLORIDE 420; 11.2; 5.72; 1.48 G/4L; G/4L; G/4L; G/4L
4000 POWDER, FOR SOLUTION ORAL ONCE
Qty: 4000 ML | Refills: 0 | Status: SHIPPED | OUTPATIENT
Start: 2024-02-12 | End: 2024-02-12

## 2024-02-12 NOTE — PROGRESS NOTES
[facility] - Outpatient Follow-up Note  Barbara Moreno 83 y.o. female MRN: [id number]  Encounter: [id number]          ASSESSMENT AND PLAN:      1. Villous adenoma polyp of rectum  Given her relative good health and recurrent, advanced rectal adenoma as well as other large adenomas throughout the colon on prior colonoscopies it would certainly make sense to consider continued surveillance despite her age.  We discussed this and she would like to proceed.    - Colonoscopy; Future  - polyethylene glycol-electrolytes (TriLyte) 4000 mL solution; Take 4,000 mL by mouth once for 1 dose Take 4000 mL by mouth once for 1 dose. Use as directed  Dispense: 4000 mL; Refill: 0    ______________________________________________________________________    SUBJECTIVE: Patient presents to discuss consideration of surveillance colonoscopy.  She has a history of a large, laterally spreading villous adenoma with high-grade dysplasia of the rectum in early 2021 which was removed with hot snare.  Follow-up later that year revealed some residual polyp which was again removed.  Surveillance in March 2023 again revealed a 10 to 19 mm villous adenoma of the rectum.  Unclear if this was residual or newly formed.  She is quite healthy and active      REVIEW OF SYSTEMS:    ROS       Historical Information   Past Medical History:   Diagnosis Date    Acid reflux     Aneurysm (HCC)     is in a cavernous area with an meningioma-followed by MRI (left side), no change since on 2 MRI's- last MRI 2015    Aneurysm (HCC)     brain    Arthritis     fingers    Munoz's esophagus     Bite from insect     had several wasp bites on each arm  7/11/`6    Colon polyp     Eczema     fingers    Gastric dysplasia     GERD (gastroesophageal reflux disease)     History of Lyme disease     History of palpitations     Hypertension     Iron disorder     high level- 199, gave i unit of blood, level now may be lower    Irregular heart beat  09/27/2021    new onset of paroxysmal afib, wore holter monitor for 2 weeks, 2nd monitoring done last year- no problems at that time per patient.    Lyme disease     Meningioma (HCC)     brain    Paroxysmal A-fib (HCC)     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     Past Surgical History:   Procedure Laterality Date    ADENOIDECTOMY      ADRENALECTOMY Left 2010    APPENDECTOMY      BUNIONECTOMY Bilateral     CATARACT EXTRACTION Bilateral     COLONOSCOPY      CYSTOSCOPY      ESOPHAGOGASTRODUODENOSCOPY  12/2015    FOOT SURGERY Bilateral     bunionectomy, neuroma removed ezequiel., hammertoe ezequiel.    PA EGD TRANSORAL BIOPSY SINGLE/MULTIPLE N/A 08/09/2017    Procedure: ESOPHAGOGASTRODUODENOSCOPY (EGD);  Surgeon: Gatito Gama MD;  Location: [facility] GI LAB;  Service: Gastroenterology    PA XCAPSL CTRC RMVL INSJ IO LENS PROSTH W/O ECP Left 07/21/2016    Procedure: EXTRACTION EXTRACAPSULAR CATARACT PHACO INTRAOCULAR LENS (IOL);  Surgeon: Naina Rinaldi MD;  Location: [facility] MAIN OR;  Service: Ophthalmology    PA XCAPSL CTRC RMVL INSJ IO LENS PROSTH W/O ECP Right 06/23/2016    Procedure: EXTRACTION EXTRACAPSULAR CATARACT PHACO INTRAOCULAR LENS (IOL);  Surgeon: Naina Rinaldi MD;  Location: [facility] MAIN OR;  Service: Ophthalmology    TONSILLECTOMY AND ADENOIDECTOMY      TUBAL LIGATION      UPPER GASTROINTESTINAL ENDOSCOPY       Social History   Social History     Substance and Sexual Activity   Alcohol Use Never     Social History     Substance and Sexual Activity   Drug Use No     Social History     Tobacco Use   Smoking Status Never   Smokeless Tobacco Never     Family History   Problem Relation Age of Onset    COPD Mother         age 93    Hypertension Mother         Late in life    Transient ischemic attack Father     Hypertension Father     Cancer Father 70        possible stomach cancer    Cancer Sister 71        breast    Heart disease Sister         silent MI from chemo       Meds/Allergies       Current Outpatient  Medications:     cholecalciferol (VITAMIN D3) 1,000 units tablet    cyanocobalamin (VITAMIN B-12) 100 mcg tablet    MAGNESIUM PO    metoprolol succinate (TOPROL-XL) 25 mg 24 hr tablet    polyethylene glycol-electrolytes (TriLyte) 4000 mL solution    Potassium Gluconate 2.5 MEQ TABS    Allergies   Allergen Reactions    Latex      Burning to vaginal area with GYN exam    Pantoprazole Hives    Penicillins Hives    Sulfa Antibiotics Hives    Contrast Dye [Iodinated Contrast Media] Anxiety     Patient was having palpitations and tachycardia           Objective     Blood pressure (!) 194/102, pulse 73, height 5' (1.524 m), weight 49.9 kg (110 lb), not currently breastfeeding. Body mass index is 21.48 kg/m².      PHYSICAL EXAM:      Physical Exam  Vitals and nursing note reviewed.   Constitutional:       General: She is not in acute distress.     Appearance: She is not ill-appearing.   HENT:      Head: Normocephalic and atraumatic.   Eyes:      General: No scleral icterus.     Extraocular Movements: Extraocular movements intact.   Cardiovascular:      Rate and Rhythm: Normal rate.   Pulmonary:      Effort: Pulmonary effort is normal. No respiratory distress.   Skin:     General: Skin is warm and dry.      Coloration: Skin is not cyanotic.      Findings: No erythema.   Neurological:      General: No focal deficit present.      Mental Status: She is alert and oriented to person, place, and time.   Psychiatric:         Mood and Affect: Mood normal.         Behavior: Behavior normal.          Lab Results:   No visits with results within 1 Day(s) from this visit.   Latest known visit with results is:   Appointment on 01/09/2024   Component Date Value    WBC 01/09/2024 5.96     RBC 01/09/2024 5.02     Hemoglobin 01/09/2024 15.0     Hematocrit 01/09/2024 46.8 (H)     MCV 01/09/2024 93     MCH 01/09/2024 29.9     MCHC 01/09/2024 32.1     RDW 01/09/2024 13.2     MPV 01/09/2024 11.2     Platelets 01/09/2024 298     nRBC 01/09/2024 0      Neutrophils Relative 01/09/2024 57     Immat GRANS % 01/09/2024 0     Lymphocytes Relative 01/09/2024 36     Monocytes Relative 01/09/2024 5     Eosinophils Relative 01/09/2024 1     Basophils Relative 01/09/2024 1     Neutrophils Absolute 01/09/2024 3.36     Immature Grans Absolute 01/09/2024 0.02     Lymphocytes Absolute 01/09/2024 2.15     Monocytes Absolute 01/09/2024 0.32     Eosinophils Absolute 01/09/2024 0.08     Basophils Absolute 01/09/2024 0.03     Sodium 01/09/2024 141     Potassium 01/09/2024 5.1     Chloride 01/09/2024 100     CO2 01/09/2024 29     ANION GAP 01/09/2024 12     BUN 01/09/2024 15     Creatinine 01/09/2024 0.69     Glucose, Fasting 01/09/2024 88     Calcium 01/09/2024 10.1     eGFR 01/09/2024 81     Total Bilirubin 01/09/2024 0.79     Bilirubin, Direct 01/09/2024 0.14     Alkaline Phosphatase 01/09/2024 71     AST 01/09/2024 30     ALT 01/09/2024 22     Total Protein 01/09/2024 7.5     Albumin 01/09/2024 4.9     FATEMEH 01/09/2024 Negative     Cholesterol 01/09/2024 270 (H)     Triglycerides 01/09/2024 109     HDL, Direct 01/09/2024 70     LDL Calculated 01/09/2024 178 (H)     Non-HDL-Chol (CHOL-HDL) 01/09/2024 200     D-Dimer, Quant 01/09/2024 0.31     TSH 3RD GENERATON 01/09/2024 2.017     Free T4 01/09/2024 0.88     DHEA-SO4 01/09/2024 127.0     Testosterone 01/09/2024 75     Estradiol 01/09/2024 16.7     Progesterone 01/09/2024 0.11     Magnesium 01/09/2024 2.2     Zinc 01/09/2024 101     IODINE,SERUM 01/09/2024 49.6     Copper 01/09/2024 139     Selenium, Blood 01/09/2024 132     Miscellaneous Lab Test R* 01/09/2024 omega check     Miscellaneous Lab Test R* 01/09/2024 NATURAL KILLER CELLS          Radiology Results:   XR knee 4+ vw left injury    Result Date: 2/10/2024  Narrative: Study: 4 views left knee. COMPARISON: No prior study. HISTORY: Fell. Normal bony mineralization. No fracture or dislocation. Joint spaces are well-preserved with no deformity to the articular surfaces.  Suspected old healed fracture proximal fibular shaft. The patella is intact. No joint effusion present.    Impression: IMPRESSION: No acute fracture or dislocation. Workstation:[id number]    CT head wo contrast    Result Date: 2/10/2024  Narrative: Clinical information: Trauma Technique: Unenhanced CT scan of the brain was performed by department technique. Images were reviewed in soft tissue and bone algorithms with 2.5 mm axial sections, sagittal and coronal reformations. Comparison: 5/14/2015. Findings Brain Parenchyma: Mild chronic microvascular white matter ischemic changes within the periventricular and subcortical white matter.. Ventricular system, basal cisterns and extra-axial spaces: Appropriate for age. Intracranial hemorrhage: None. Midline shift: None. Skull base & Calvarium: Normal. Paranasal sinuses and mastoid air cells: Clear. Visualized orbits: Normal. Sella: Normal.    Impression: Impression: No acute hemorrhage, mass or ischemia identified. Workstation:[id number]

## 2024-02-15 ENCOUNTER — PREP FOR PROCEDURE (OUTPATIENT)
Dept: GASTROENTEROLOGY | Facility: CLINIC | Age: 84
End: 2024-02-15

## 2024-02-15 DIAGNOSIS — R11.2 NAUSEA AND VOMITING, UNSPECIFIED VOMITING TYPE: Primary | ICD-10-CM

## 2024-02-19 ENCOUNTER — TELEPHONE (OUTPATIENT)
Dept: GASTROENTEROLOGY | Facility: CLINIC | Age: 84
End: 2024-02-19

## 2024-02-19 NOTE — TELEPHONE ENCOUNTER
Dr Hawley , do you want to add an egd for this pt? If so can you please create the order. Thanks Camila   
Pt called back and would like to know if she can schedule and EGD as well. She states she has been having some burning and vomiting. She states Tuesday night, she had severe burning and vomited. She states it was all bile and burned her throat. She can be reached at [phone]  
Pt was seen in office today by Dr Hawley and is to be scheduled for a colonoscopy, however, she has to look at her schedule. Will call pt to try to schedule if do not hear back from her.     Scheduled date of colonoscopy (as of today): will call  Physician performing colonoscopy: Dr Hawley  Location of colonoscopy: [facility]   Bowel prep reviewed with patient: Cristina madrigal given at appt   Instructions reviewed with patient by: teddy  Clearances: n/a  
Schedule EGD/Colonoscopy for 4/11/24 at [facility]  
MEDICINE

## 2024-02-19 NOTE — TELEPHONE ENCOUNTER
Scheduled date of EGD/colonoscopy (as of today):04/11/24  Physician performing EGD/colonoscopy:Chandan  Location of EGD/colonoscopy:[facility]  Desired bowel prep reviewed with patient:Cristina  Instructions reviewed with patient by:Leslye RIOS  Clearances:  None

## 2024-03-28 ENCOUNTER — ANESTHESIA EVENT (OUTPATIENT)
Dept: ANESTHESIOLOGY | Facility: HOSPITAL | Age: 84
End: 2024-03-28

## 2024-03-28 ENCOUNTER — ANESTHESIA (OUTPATIENT)
Dept: ANESTHESIOLOGY | Facility: HOSPITAL | Age: 84
End: 2024-03-28

## 2024-04-11 ENCOUNTER — HOSPITAL ENCOUNTER (OUTPATIENT)
Dept: GASTROENTEROLOGY | Facility: AMBULARY SURGERY CENTER | Age: 84
Setting detail: OUTPATIENT SURGERY
End: 2024-04-11
Attending: INTERNAL MEDICINE
Payer: MEDICARE

## 2024-04-11 ENCOUNTER — ANESTHESIA (OUTPATIENT)
Dept: GASTROENTEROLOGY | Facility: AMBULARY SURGERY CENTER | Age: 84
End: 2024-04-11

## 2024-04-11 ENCOUNTER — ANESTHESIA EVENT (OUTPATIENT)
Dept: GASTROENTEROLOGY | Facility: AMBULARY SURGERY CENTER | Age: 84
End: 2024-04-11

## 2024-04-11 VITALS
DIASTOLIC BLOOD PRESSURE: 65 MMHG | TEMPERATURE: 98.1 F | HEART RATE: 67 BPM | SYSTOLIC BLOOD PRESSURE: 119 MMHG | RESPIRATION RATE: 18 BRPM | OXYGEN SATURATION: 99 %

## 2024-04-11 DIAGNOSIS — R11.2 NAUSEA AND VOMITING, UNSPECIFIED VOMITING TYPE: ICD-10-CM

## 2024-04-11 DIAGNOSIS — D37.5 VILLOUS ADENOMA POLYP OF RECTUM: ICD-10-CM

## 2024-04-11 PROBLEM — K22.719 BARRETT'S ESOPHAGUS WITH DYSPLASIA: Status: ACTIVE | Noted: 2021-06-11

## 2024-04-11 PROBLEM — I44.7 INCOMPLETE LEFT BUNDLE BRANCH BLOCK (LBBB): Status: ACTIVE | Noted: 2017-02-24

## 2024-04-11 PROCEDURE — 45380 COLONOSCOPY AND BIOPSY: CPT | Performed by: INTERNAL MEDICINE

## 2024-04-11 PROCEDURE — 43239 EGD BIOPSY SINGLE/MULTIPLE: CPT | Performed by: INTERNAL MEDICINE

## 2024-04-11 PROCEDURE — 45390 COLONOSCOPY W/RESECTION: CPT | Performed by: INTERNAL MEDICINE

## 2024-04-11 PROCEDURE — 88305 TISSUE EXAM BY PATHOLOGIST: CPT | Performed by: PATHOLOGY

## 2024-04-11 RX ORDER — SODIUM CHLORIDE, SODIUM LACTATE, POTASSIUM CHLORIDE, CALCIUM CHLORIDE 600; 310; 30; 20 MG/100ML; MG/100ML; MG/100ML; MG/100ML
125 INJECTION, SOLUTION INTRAVENOUS CONTINUOUS
Status: CANCELLED | OUTPATIENT
Start: 2024-04-11

## 2024-04-11 RX ORDER — LIDOCAINE HYDROCHLORIDE 10 MG/ML
INJECTION, SOLUTION EPIDURAL; INFILTRATION; INTRACAUDAL; PERINEURAL AS NEEDED
Status: DISCONTINUED | OUTPATIENT
Start: 2024-04-11 | End: 2024-04-11

## 2024-04-11 RX ORDER — PROPOFOL 10 MG/ML
INJECTION, EMULSION INTRAVENOUS AS NEEDED
Status: DISCONTINUED | OUTPATIENT
Start: 2024-04-11 | End: 2024-04-11

## 2024-04-11 RX ORDER — PROPOFOL 10 MG/ML
INJECTION, EMULSION INTRAVENOUS CONTINUOUS PRN
Status: DISCONTINUED | OUTPATIENT
Start: 2024-04-11 | End: 2024-04-11

## 2024-04-11 RX ORDER — SODIUM CHLORIDE, SODIUM LACTATE, POTASSIUM CHLORIDE, CALCIUM CHLORIDE 600; 310; 30; 20 MG/100ML; MG/100ML; MG/100ML; MG/100ML
INJECTION, SOLUTION INTRAVENOUS CONTINUOUS PRN
Status: DISCONTINUED | OUTPATIENT
Start: 2024-04-11 | End: 2024-04-11

## 2024-04-11 RX ORDER — GLYCOPYRROLATE 0.2 MG/ML
INJECTION INTRAMUSCULAR; INTRAVENOUS AS NEEDED
Status: DISCONTINUED | OUTPATIENT
Start: 2024-04-11 | End: 2024-04-11

## 2024-04-11 RX ADMIN — SODIUM CHLORIDE, SODIUM LACTATE, POTASSIUM CHLORIDE, AND CALCIUM CHLORIDE: .6; .31; .03; .02 INJECTION, SOLUTION INTRAVENOUS at 08:58

## 2024-04-11 RX ADMIN — LIDOCAINE HYDROCHLORIDE 50 MG: 10 INJECTION, SOLUTION EPIDURAL; INFILTRATION; INTRACAUDAL; PERINEURAL at 09:06

## 2024-04-11 RX ADMIN — PROPOFOL 100 MCG/KG/MIN: 10 INJECTION, EMULSION INTRAVENOUS at 09:06

## 2024-04-11 RX ADMIN — GLYCOPYRROLATE 0.2 MG: 0.2 INJECTION, SOLUTION INTRAMUSCULAR; INTRAVENOUS at 09:21

## 2024-04-11 RX ADMIN — PROPOFOL 70 MG: 10 INJECTION, EMULSION INTRAVENOUS at 09:06

## 2024-04-11 NOTE — ANESTHESIA PREPROCEDURE EVALUATION
Procedure:  COLONOSCOPY  EGD    Relevant Problems   CARDIO   (+) Incomplete left bundle branch block (LBBB)   (+) Paroxysmal atrial fibrillation (HCC)   (+) SVT (supraventricular tachycardia)      FEN/Gastrointestinal   (+) Munoz's esophagus with dysplasia      Other   (+) Dyslipidemia   Pt is currently off metoprolol, just completed a holter monitor. No symptoms. She agrees to contact her cardiologist by phone if any hypertension later today/post-procedure and to proceed to ED if any symptoms       Vitals:    04/11/24 0743   BP: (!) 184/87   Pulse: 77   Resp: 16   Temp: 98.1 °F (36.7 °C)   SpO2: 99%       S/p adrenalectomy  Physical Exam    Airway    Mallampati score: II  TM Distance: >3 FB  Neck ROM: full     Dental   Comment: Denies loose teeth     Cardiovascular  Cardiovascular exam normal    Pulmonary  Pulmonary exam normal     Other Findings  Portions of exam deferred due to low yield and/or unknown COVID statuspost-pubertal.      Anesthesia Plan  ASA Score- 3     Anesthesia Type- IV sedation with anesthesia with ASA Monitors.         Additional Monitors:     Airway Plan:            Plan Factors-Exercise tolerance (METS): >4 METS.    Chart reviewed.   Existing labs reviewed. Patient summary reviewed.    Patient is not a current smoker.              Induction- intravenous.    Postoperative Plan-     Informed Consent- Anesthetic plan and risks discussed with patient.  I personally reviewed this patient with the CRNA. Discussed and agreed on the Anesthesia Plan with the CRNA..

## 2024-04-11 NOTE — ANESTHESIA POSTPROCEDURE EVALUATION
Post-Op Assessment Note    CV Status:  Stable  Pain Score: 0    Pain management: adequate       Mental Status:  Sleepy and arousable   Hydration Status:  Stable   PONV Controlled:  None   Airway Patency:  Patent  Two or more mitigation strategies used for obstructive sleep apnea   Post Op Vitals Reviewed: Yes    No anethesia notable event occurred.    Staff: CRNA   Comments: spontaneously breathing, protecting airway, simple mask to O2, vss, fully endorsed to recovery w/o AC              BP   109/54   Temp      Pulse  72   Resp   15   SpO2   99

## 2024-04-11 NOTE — H&P
History and Physical -  [facility]  Barbara Moreno 83 y.o. female MRN: [id number]                  HPI: Barbara Moreno is a 83 y.o. year old female who presents for persistent villous adenoma, nausea vomiting      REVIEW OF SYSTEMS: Per the HPI, and otherwise unremarkable.    Historical Information   Past Medical History:   Diagnosis Date    Acid reflux     Aneurysm (HCC)     is in a cavernous area with an meningioma-followed by MRI (left side), no change since on 2 MRI's- last MRI 2015    Aneurysm (HCC)     brain    Arthritis     fingers    Munoz's esophagus     Bite from insect     had several wasp bites on each arm  7/11/`6    Colon polyp     Eczema     fingers    Gastric dysplasia     GERD (gastroesophageal reflux disease)     History of Lyme disease     History of palpitations     Hypertension     Iron disorder     high level- 199, gave i unit of blood, level now may be lower    Irregular heart beat 09/27/2021    new onset of paroxysmal afib, wore holter monitor for 2 weeks, 2nd monitoring done last year- no problems at that time per patient.    Lyme disease     Meningioma (HCC)     brain    Paroxysmal A-fib (HCC)     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     Past Surgical History:   Procedure Laterality Date    ADENOIDECTOMY      ADRENALECTOMY Left 2010    APPENDECTOMY      BUNIONECTOMY Bilateral     CATARACT EXTRACTION Bilateral     COLONOSCOPY      CYSTOSCOPY      ESOPHAGOGASTRODUODENOSCOPY  12/2015    FOOT SURGERY Bilateral     bunionectomy, neuroma removed ezequiel., hammertoe ezequiel.    UT EGD TRANSORAL BIOPSY SINGLE/MULTIPLE N/A 08/09/2017    Procedure: ESOPHAGOGASTRODUODENOSCOPY (EGD);  Surgeon: Gatito Gama MD;  Location: [facility] GI LAB;  Service: Gastroenterology    UT XCAPSL CTRC RMVL INSJ IO LENS PROSTH W/O ECP Left 07/21/2016    Procedure: EXTRACTION EXTRACAPSULAR CATARACT PHACO INTRAOCULAR LENS (IOL);  Surgeon: Naina Rinaldi MD;  Location: [facility] MAIN OR;   Service: Ophthalmology    TN XCAPSL CTRC RMVL INSJ IO LENS PROSTH W/O ECP Right 06/23/2016    Procedure: EXTRACTION EXTRACAPSULAR CATARACT PHACO INTRAOCULAR LENS (IOL);  Surgeon: Naina Rinaldi MD;  Location: [facility] MAIN OR;  Service: Ophthalmology    TONSILLECTOMY AND ADENOIDECTOMY      TUBAL LIGATION      UPPER GASTROINTESTINAL ENDOSCOPY       Social History   Social History     Substance and Sexual Activity   Alcohol Use Never     Social History     Substance and Sexual Activity   Drug Use No     Social History     Tobacco Use   Smoking Status Never   Smokeless Tobacco Never     Family History   Problem Relation Age of Onset    COPD Mother         age 93    Hypertension Mother         Late in life    Transient ischemic attack Father     Hypertension Father     Cancer Father 70        possible stomach cancer    Cancer Sister 71        breast    Heart disease Sister         silent MI from chemo       Meds/Allergies       Current Outpatient Medications:     cholecalciferol (VITAMIN D3) 1,000 units tablet    cyanocobalamin (VITAMIN B-12) 100 mcg tablet    MAGNESIUM PO    metoprolol succinate (TOPROL-XL) 25 mg 24 hr tablet    Potassium Gluconate 2.5 MEQ TABS    polyethylene glycol-electrolytes (TriLyte) 4000 mL solution    Allergies   Allergen Reactions    Latex      Burning to vaginal area with GYN exam    Pantoprazole Hives    Penicillins Hives    Sulfa Antibiotics Hives    Contrast Dye [Iodinated Contrast Media] Anxiety     Patient was having palpitations and tachycardia       Objective     BP (!) 184/87   Pulse 77   Temp 98.1 °F (36.7 °C) (Temporal)   Resp 16   SpO2 99%       PHYSICAL EXAM    Gen: NAD  Head: NCAT  CV: RRR  CHEST: Clear  ABD: soft, NT/ND  EXT: no edema      ASSESSMENT/PLAN:  This is a 83 y.o. year old female here for colonoscopy, EGD and she is stable and optimized for her procedure.

## 2024-04-15 PROCEDURE — 88305 TISSUE EXAM BY PATHOLOGIST: CPT | Performed by: PATHOLOGY

## 2024-04-18 ENCOUNTER — TELEPHONE (OUTPATIENT)
Age: 84
End: 2024-04-18

## 2024-04-18 NOTE — RESULT ENCOUNTER NOTE
Please call the patient regarding his result.  Biopsies were all benign.  Her polyps were precancerous adenomas and her stomach biopsies did show evidence of intestinal metaplasia as previously noted but no progression toward cancer.  Would consider repeat colonoscopy and EGD in 3 years if she is otherwise healthy at that time

## 2024-05-04 ENCOUNTER — HOSPITAL ENCOUNTER (OUTPATIENT)
Facility: HOSPITAL | Age: 84
Setting detail: OBSERVATION
Discharge: HOME | End: 2024-05-05
Attending: EMERGENCY MEDICINE | Admitting: EMERGENCY MEDICINE
Payer: MEDICARE

## 2024-05-04 ENCOUNTER — APPOINTMENT (EMERGENCY)
Dept: RADIOLOGY | Facility: HOSPITAL | Age: 84
End: 2024-05-04
Payer: MEDICARE

## 2024-05-04 DIAGNOSIS — R74.8 HIGH LEVEL OF CARDIAC MARKER: ICD-10-CM

## 2024-05-04 DIAGNOSIS — R79.89 TROPONIN I ABOVE REFERENCE RANGE: ICD-10-CM

## 2024-05-04 DIAGNOSIS — R10.13 EPIGASTRIC PAIN: Primary | ICD-10-CM

## 2024-05-04 DIAGNOSIS — I21.4 NSTEMI (NON-ST ELEVATED MYOCARDIAL INFARCTION) (HCC): ICD-10-CM

## 2024-05-04 PROBLEM — R03.0 ELEVATED BLOOD PRESSURE READING: Status: ACTIVE | Noted: 2024-05-04

## 2024-05-04 PROBLEM — I10 UNCONTROLLED HYPERTENSION: Status: ACTIVE | Noted: 2024-05-04

## 2024-05-04 PROBLEM — R10.9 ABDOMINAL PAIN: Status: ACTIVE | Noted: 2024-05-04

## 2024-05-04 LAB
2HR DELTA HS TROPONIN: -1 NG/L
2HR DELTA HS TROPONIN: 18 NG/L
4HR DELTA HS TROPONIN: 26 NG/L
ALBUMIN SERPL BCP-MCNC: 4.4 G/DL (ref 3.5–5)
ALP SERPL-CCNC: 64 U/L (ref 34–104)
ALT SERPL W P-5'-P-CCNC: 17 U/L (ref 7–52)
ANION GAP SERPL CALCULATED.3IONS-SCNC: 10 MMOL/L (ref 4–13)
AST SERPL W P-5'-P-CCNC: 18 U/L (ref 13–39)
BASOPHILS # BLD AUTO: 0.04 THOUSANDS/ÂΜL (ref 0–0.1)
BASOPHILS NFR BLD AUTO: 1 % (ref 0–1)
BILIRUB SERPL-MCNC: 0.57 MG/DL (ref 0.2–1)
BUN SERPL-MCNC: 18 MG/DL (ref 5–25)
CALCIUM SERPL-MCNC: 9.6 MG/DL (ref 8.4–10.2)
CARDIAC TROPONIN I PNL SERPL HS: 10 NG/L
CARDIAC TROPONIN I PNL SERPL HS: 28 NG/L
CARDIAC TROPONIN I PNL SERPL HS: 28 NG/L
CARDIAC TROPONIN I PNL SERPL HS: 29 NG/L
CARDIAC TROPONIN I PNL SERPL HS: 36 NG/L
CHLORIDE SERPL-SCNC: 103 MMOL/L (ref 96–108)
CK SERPL-CCNC: 43 U/L (ref 26–192)
CO2 SERPL-SCNC: 27 MMOL/L (ref 21–32)
CREAT SERPL-MCNC: 0.79 MG/DL (ref 0.6–1.3)
EOSINOPHIL # BLD AUTO: 0.1 THOUSAND/ÂΜL (ref 0–0.61)
EOSINOPHIL NFR BLD AUTO: 1 % (ref 0–6)
ERYTHROCYTE [DISTWIDTH] IN BLOOD BY AUTOMATED COUNT: 13.2 % (ref 11.6–15.1)
GFR SERPL CREATININE-BSD FRML MDRD: 70 ML/MIN/1.73SQ M
GLUCOSE SERPL-MCNC: 111 MG/DL (ref 65–140)
HCT VFR BLD AUTO: 43.6 % (ref 34.8–46.1)
HGB BLD-MCNC: 14.3 G/DL (ref 11.5–15.4)
IMM GRANULOCYTES # BLD AUTO: 0.01 THOUSAND/UL (ref 0–0.2)
IMM GRANULOCYTES NFR BLD AUTO: 0 % (ref 0–2)
LIPASE SERPL-CCNC: 14 U/L (ref 11–82)
LYMPHOCYTES # BLD AUTO: 2.2 THOUSANDS/ÂΜL (ref 0.6–4.47)
LYMPHOCYTES NFR BLD AUTO: 30 % (ref 14–44)
MCH RBC QN AUTO: 29.2 PG (ref 26.8–34.3)
MCHC RBC AUTO-ENTMCNC: 32.8 G/DL (ref 31.4–37.4)
MCV RBC AUTO: 89 FL (ref 82–98)
MONOCYTES # BLD AUTO: 0.48 THOUSAND/ÂΜL (ref 0.17–1.22)
MONOCYTES NFR BLD AUTO: 7 % (ref 4–12)
NEUTROPHILS # BLD AUTO: 4.49 THOUSANDS/ÂΜL (ref 1.85–7.62)
NEUTS SEG NFR BLD AUTO: 61 % (ref 43–75)
NRBC BLD AUTO-RTO: 0 /100 WBCS
PLATELET # BLD AUTO: 253 THOUSANDS/UL (ref 149–390)
PMV BLD AUTO: 10.7 FL (ref 8.9–12.7)
POTASSIUM SERPL-SCNC: 3.6 MMOL/L (ref 3.5–5.3)
PROT SERPL-MCNC: 7.1 G/DL (ref 6.4–8.4)
RBC # BLD AUTO: 4.9 MILLION/UL (ref 3.81–5.12)
SODIUM SERPL-SCNC: 140 MMOL/L (ref 135–147)
WBC # BLD AUTO: 7.32 THOUSAND/UL (ref 4.31–10.16)

## 2024-05-04 PROCEDURE — 93005 ELECTROCARDIOGRAM TRACING: CPT

## 2024-05-04 PROCEDURE — 99285 EMERGENCY DEPT VISIT HI MDM: CPT | Performed by: EMERGENCY MEDICINE

## 2024-05-04 PROCEDURE — 36415 COLL VENOUS BLD VENIPUNCTURE: CPT | Performed by: EMERGENCY MEDICINE

## 2024-05-04 PROCEDURE — 80053 COMPREHEN METABOLIC PANEL: CPT | Performed by: EMERGENCY MEDICINE

## 2024-05-04 PROCEDURE — 99223 1ST HOSP IP/OBS HIGH 75: CPT | Performed by: INTERNAL MEDICINE

## 2024-05-04 PROCEDURE — 85025 COMPLETE CBC W/AUTO DIFF WBC: CPT | Performed by: EMERGENCY MEDICINE

## 2024-05-04 PROCEDURE — 83690 ASSAY OF LIPASE: CPT | Performed by: EMERGENCY MEDICINE

## 2024-05-04 PROCEDURE — 84484 ASSAY OF TROPONIN QUANT: CPT | Performed by: INTERNAL MEDICINE

## 2024-05-04 PROCEDURE — 74176 CT ABD & PELVIS W/O CONTRAST: CPT

## 2024-05-04 PROCEDURE — 84484 ASSAY OF TROPONIN QUANT: CPT | Performed by: EMERGENCY MEDICINE

## 2024-05-04 PROCEDURE — 99285 EMERGENCY DEPT VISIT HI MDM: CPT

## 2024-05-04 PROCEDURE — 82550 ASSAY OF CK (CPK): CPT | Performed by: INTERNAL MEDICINE

## 2024-05-04 RX ORDER — LABETALOL HYDROCHLORIDE 5 MG/ML
10 INJECTION, SOLUTION INTRAVENOUS ONCE
Status: COMPLETED | OUTPATIENT
Start: 2024-05-04 | End: 2024-05-04

## 2024-05-04 RX ORDER — FAMOTIDINE 20 MG/1
20 TABLET, FILM COATED ORAL ONCE
Status: COMPLETED | OUTPATIENT
Start: 2024-05-04 | End: 2024-05-04

## 2024-05-04 RX ORDER — MORPHINE SULFATE 4 MG/ML
4 INJECTION, SOLUTION INTRAMUSCULAR; INTRAVENOUS ONCE
Status: DISCONTINUED | OUTPATIENT
Start: 2024-05-04 | End: 2024-05-04

## 2024-05-04 RX ORDER — MAGNESIUM HYDROXIDE/ALUMINUM HYDROXICE/SIMETHICONE 120; 1200; 1200 MG/30ML; MG/30ML; MG/30ML
30 SUSPENSION ORAL ONCE
Status: COMPLETED | OUTPATIENT
Start: 2024-05-04 | End: 2024-05-04

## 2024-05-04 RX ORDER — ONDANSETRON 2 MG/ML
4 INJECTION INTRAMUSCULAR; INTRAVENOUS ONCE
Status: DISCONTINUED | OUTPATIENT
Start: 2024-05-04 | End: 2024-05-04

## 2024-05-04 RX ORDER — FAMOTIDINE 20 MG/1
20 TABLET, FILM COATED ORAL 2 TIMES DAILY
Qty: 30 TABLET | Refills: 0 | Status: SHIPPED | OUTPATIENT
Start: 2024-05-04

## 2024-05-04 RX ORDER — ALUMINUM HYDROXIDE, MAGNESIUM HYDROXIDE, SIMETHICONE 400; 400; 40 MG/10ML; MG/10ML; MG/10ML
30 SUSPENSION ORAL
Qty: 840 ML | Refills: 0 | Status: SHIPPED | OUTPATIENT
Start: 2024-05-04 | End: 2024-05-05

## 2024-05-04 RX ORDER — ACETAMINOPHEN 325 MG/1
650 TABLET ORAL EVERY 6 HOURS SCHEDULED
Status: DISCONTINUED | OUTPATIENT
Start: 2024-05-04 | End: 2024-05-05 | Stop reason: HOSPADM

## 2024-05-04 RX ORDER — ENOXAPARIN SODIUM 100 MG/ML
40 INJECTION SUBCUTANEOUS DAILY
Status: DISCONTINUED | OUTPATIENT
Start: 2024-05-05 | End: 2024-05-05 | Stop reason: HOSPADM

## 2024-05-04 RX ORDER — ASPIRIN 81 MG/1
324 TABLET, CHEWABLE ORAL ONCE
Status: COMPLETED | OUTPATIENT
Start: 2024-05-04 | End: 2024-05-04

## 2024-05-04 RX ADMIN — ALUMINUM HYDROXIDE, MAGNESIUM HYDROXIDE, DIMETHICONE 30 ML: 400; 400; 40 SUSPENSION ORAL at 05:45

## 2024-05-04 RX ADMIN — VITAM B12 100 MCG: 100 TAB at 14:35

## 2024-05-04 RX ADMIN — ACETAMINOPHEN 650 MG: 325 TABLET ORAL at 14:33

## 2024-05-04 RX ADMIN — LABETALOL HYDROCHLORIDE 10 MG: 5 INJECTION, SOLUTION INTRAVENOUS at 07:36

## 2024-05-04 RX ADMIN — FAMOTIDINE 20 MG: 20 TABLET, FILM COATED ORAL at 05:45

## 2024-05-04 RX ADMIN — ASPIRIN 81 MG CHEWABLE TABLET 324 MG: 81 TABLET CHEWABLE at 07:36

## 2024-05-04 RX ADMIN — Medication 1000 UNITS: at 14:34

## 2024-05-04 NOTE — ED NOTES
Patient states her cardiologist paused her Metoprolol; States she has not taken it for about 2 months and had to wear a heart monitor during that time. Scheduled to speak with her cardiologist on 5/9.     Melissa Lamas RN  05/04/24 021

## 2024-05-04 NOTE — H&P
History and Physical - [facility] Internal Medicine    Patient Information: Barbara Moreno 83 y.o. female MRN: [id number]  Unit/Bed#: ED 04 Encounter: [id number]  Admitting Physician: Edouard Santillan MD  PCP: Etta Saucedo MD  Date of Admission:  05/04/24        Hospital Problem List:     Principal Problem:    Abdominal pain  Active Problems:    Troponin I above reference range    Elevated blood pressure reading    Paroxysmal atrial fibrillation (HCC)    Munoz's esophagus with dysplasia    Dyslipidemia      Assessment/Plan:    Elevated blood pressure reading  Assessment & Plan  Likely in setting of pain, anxiety  Checks blood pressure daily and reports being normotensive at home  S/p labetalol in ED, blood pressure trend improving spontaneously  Pain control  Monitor blood pressure trend    Troponin I above reference range  Assessment & Plan  Troponin noted to be 10-28-36  EKG without any acute abnormality, patient without any cardiorespiratory symptoms  Patient reports being very active with good exercise tolerance without any cardiorespiratory symptoms  Likely secondary to uncontrolled hypertension/hypertensive urgency  Monitor troponin trend  Monitor blood pressure  Pain control    * Abdominal pain  Assessment & Plan  Presented with mid and upper abdominal discomfort since 1 day worse with palpation and movement without any radiation.  Patient denies any cardiorespiratory symptoms or GI symptoms  Patient does report that she started doing abdominal muscle exercises a day prior, she used to drink in the past but had not done for long time until yesterday  Afebrile vital signs stable.  CBC CMP unremarkable  CT abdomen without any acute abnormality patient with recent EGD which was unremarkable.  Lipase normal  Likely musculoskeletal  Monitor symptoms  Pain control, symptomatic treatment  Diet as tolerated  Monitor abdominal exam  Follow-up labs  Further workup pending clinical progress    Munoz's  esophagus with dysplasia  Assessment & Plan  Status post recent EGD  Currently not on medication    Paroxysmal atrial fibrillation (HCC)  Assessment & Plan  Very brief episode as per patient.  Subsequent extended monitoring without any evidence of recurrent A-fib  Currently off metoprolol and underwent repeat extended cardiac monitoring  Telemetry  Follow-up with cardiology after discharge    Dyslipidemia  Assessment & Plan  LDL elevated, patient prefers not to be on medication  Continue diet and lifestyle modification            VTE Prophylaxis: Enoxaparin (Lovenox) Code Status: Level 1 - Full Code    Anticipated Length of Stay:  Patient will be admitted on an Observation basis with an anticipated length of stay of  < 2 midnights.   Justification for Hospital Stay: Abdominal discomfort    Total Time for Visit, including Counseling / Coordination of Care: 45 minutes.  Greater than 50% of this total time spent on direct patient counseling and coordination of care.    Chief Complaint:     Abdominal Pain (Patient complains of mid upper abdominal pain starting yesterday afternoon. No N/V/D. Patient states the area is even tender to touch.)    History of Present Illness:    Barbara Moreno is a 83 y.o. female with history of Munoz's esophagus/GERD, history of paroxysmal A-fib, hypertension, meningioma/brain aneurysm who presents with upper abdominal pain.  Patient reported that she started having mid and upper abdominal discomfort since yesterday which was worse with palpation, without any radiation.  Patient denies any chest pain, shortness of breath, fever, chills, nausea, vomiting, change in bowel habit, food intolerance.  She reported that her pain persisted and she got worried as she lives alone and she presented to ED for further evaluation.  Patient is currently comfortably lying in bed without distress.  Reports that she is very hungry and thirsty.      In ED patient was noted to be hypertensive with blood  pressure 224/103.  Patient was afebrile saturating adequately on room air.  CBC CMP lipase were unremarkable.  Patient underwent CT abdomen pelvis without contrast which did not reveal any acute abnormality.EKG reveals sinus bradycardia at 59 bpm, no acute ST-T wave changes noted.  Troponin noted to be 10-28-36.  Patient received aspirin, Pepcid, labetalol, MiraLAX.  Morphine and Zofran was offered but patient declined.  Patient was subsequently admitted due to elevated cardiac markers.    Patient recently had EGD and colonoscopy on 4/11-EGD unremarkable except some erythematous mucosa in the antrum.  Colonoscopy revealed diverticulosis of the descending and sigmoid colon.  3 subcentimeter and one 5-9 mm polyp were removed.    Patient had extended cardiac monitoring for evaluation for arrhythmia which revealed evidence of episodes of supraventricular tachycardia.  Echocardiogram 9/21 revealed EF 55 to 60%, grade 1 diastolic dysfunction.  No significant valvular abnormalities noted.    Patient did report that she started doing abdominal muscle exercises a day prior.  She reports that she was doing it in the past but did not do it for long time prior to 2 days ago.  Patient reports that usually she is very active, does ride bike every day and does heavy work in the yard.  She reports that her friends always tell her to slow down because they cannot keep up with her though they are much younger.  Patient denies any history of exertional chest pain, shortness of breath, palpitation, dizziness.    Review of Systems:    Review of Systems   Constitutional:  Negative for appetite change, fatigue and fever.   HENT:  Negative for congestion.    Respiratory:  Negative for cough and shortness of breath.    Cardiovascular:  Negative for chest pain and palpitations.   Gastrointestinal:  Positive for abdominal pain. Negative for constipation, diarrhea, nausea and vomiting.   Genitourinary:  Negative for difficulty urinating.    Musculoskeletal:  Negative for back pain and gait problem.   Neurological:  Negative for dizziness, light-headedness and headaches.   Psychiatric/Behavioral:  Negative for confusion.        Past Medical and Surgical History:     Past Medical History:   Diagnosis Date    Acid reflux     Aneurysm (HCC)     is in a cavernous area with an meningioma-followed by MRI (left side), no change since on 2 MRI's- last MRI 2015    Aneurysm (HCC)     brain    Arthritis     fingers    Munzo's esophagus     Bite from insect     had several wasp bites on each arm  7/11/`6    Colon polyp     Eczema     fingers    Gastric dysplasia     GERD (gastroesophageal reflux disease)     History of Lyme disease     History of palpitations     Hypertension     Iron disorder     high level- 199, gave i unit of blood, level now may be lower    Irregular heart beat 09/27/2021    new onset of paroxysmal afib, wore holter monitor for 2 weeks, 2nd monitoring done last year- no problems at that time per patient.    Lyme disease     Meningioma (HCC)     brain    Paroxysmal A-fib (HCC)     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       Past Surgical History:   Procedure Laterality Date    ADENOIDECTOMY      ADRENALECTOMY Left 2010    APPENDECTOMY      BUNIONECTOMY Bilateral     CATARACT EXTRACTION Bilateral     COLONOSCOPY      CYSTOSCOPY      ESOPHAGOGASTRODUODENOSCOPY  12/2015    FOOT SURGERY Bilateral     bunionectomy, neuroma removed ezequiel., hammertoe ezequiel.    NC EGD TRANSORAL BIOPSY SINGLE/MULTIPLE N/A 08/09/2017    Procedure: ESOPHAGOGASTRODUODENOSCOPY (EGD);  Surgeon: Gatito Gama MD;  Location: [facility] GI LAB;  Service: Gastroenterology    NC XCAPSL CTRC RMVL INSJ IO LENS PROSTH W/O ECP Left 07/21/2016    Procedure: EXTRACTION EXTRACAPSULAR CATARACT PHACO INTRAOCULAR LENS (IOL);  Surgeon: Naina Rinaldi MD;  Location: [facility] MAIN OR;  Service: Ophthalmology    NC XCAPSL CTR RMVL INSJ IO LENS PROSTH W/O ECP Right 06/23/2016     Procedure: EXTRACTION EXTRACAPSULAR CATARACT PHACO INTRAOCULAR LENS (IOL);  Surgeon: Naina Rinaldi MD;  Location: [facility] MAIN OR;  Service: Ophthalmology    TONSILLECTOMY AND ADENOIDECTOMY      TUBAL LIGATION      UPPER GASTROINTESTINAL ENDOSCOPY         Meds/Allergies:    PTA meds:   Prior to Admission Medications   Prescriptions Last Dose Informant Patient Reported? Taking?   MAGNESIUM PO 5/3/2024 Self Yes Yes   Sig: Take 500 mg by mouth every morning    Potassium Gluconate 2.5 MEQ TABS 5/3/2024 Self Yes Yes   Sig: Take 99 mg by mouth   cholecalciferol (VITAMIN D3) 1,000 units tablet 5/3/2024 Self Yes Yes   Sig: Take 1,000 Units by mouth daily   cyanocobalamin (VITAMIN B-12) 100 mcg tablet 5/3/2024 Self Yes Yes   Sig: Take by mouth daily   metoprolol succinate (TOPROL-XL) 25 mg 24 hr tablet  Self Yes No   Sig: Take 25 mg by mouth daily Patient started taking 1/2 tablet daily in evening on 10/3      Facility-Administered Medications: None       Allergies:   Allergies   Allergen Reactions    Latex      Burning to vaginal area with GYN exam    Pantoprazole Hives    Penicillins Hives    Sulfa Antibiotics Hives    Contrast Dye [Iodinated Contrast Media] Anxiety     Patient was having palpitations and tachycardia     History:     Marital Status:      Substance Use History:   Social History     Substance and Sexual Activity   Alcohol Use Never     Social History     Tobacco Use   Smoking Status Never   Smokeless Tobacco Never     Social History     Substance and Sexual Activity   Drug Use No       Family History:    Family History   Problem Relation Age of Onset    COPD Mother         age 93    Hypertension Mother         Late in life    Transient ischemic attack Father     Hypertension Father     Cancer Father 70        possible stomach cancer    Cancer Sister 71        breast    Heart disease Sister         silent MI from chemo       Physical Exam:     Vitals:   Blood Pressure: 154/84 (05/04/24 0645)  Pulse: 58  (05/04/24 0900)  Temperature: 97.6 °F (36.4 °C) (05/04/24 0209)  Temp Source: Oral (05/04/24 0209)  Respirations: 20 (05/04/24 0900)  Height: 5' (152.4 cm) (05/04/24 0209)  Weight - Scale: 52.2 kg (115 lb) (05/04/24 0209)  SpO2: 98 % (05/04/24 0900)    Physical Exam  Constitutional:       General: She is not in acute distress.  HENT:      Head: Normocephalic and atraumatic.   Cardiovascular:      Rate and Rhythm: Normal rate.   Pulmonary:      Effort: Pulmonary effort is normal. No respiratory distress.      Breath sounds: Normal breath sounds. No wheezing or rales.   Abdominal:      General: Bowel sounds are normal. There is no distension.      Palpations: Abdomen is soft.      Tenderness: There is no abdominal tenderness. There is no right CVA tenderness, left CVA tenderness, guarding or rebound.          Comments: Upper and central abdominal tenderness, reproducible superficial palpation and lateral movements   Musculoskeletal:      Right lower leg: No edema.      Left lower leg: No edema.   Skin:     General: Skin is warm and dry.      Findings: No rash.   Neurological:      General: No focal deficit present.      Mental Status: She is alert and oriented to person, place, and time. Mental status is at baseline.   Psychiatric:         Mood and Affect: Mood is anxious.                 Lab Results: I have personally reviewed pertinent reports.      Results from last 7 days   Lab Units 05/04/24  0234   WBC Thousand/uL 7.32   HEMOGLOBIN g/dL 14.3   HEMATOCRIT % 43.6   PLATELETS Thousands/uL 253   SEGS PCT % 61   LYMPHO PCT % 30   MONO PCT % 7   EOS PCT % 1     Results from last 7 days   Lab Units 05/04/24  0234   POTASSIUM mmol/L 3.6   CHLORIDE mmol/L 103   CO2 mmol/L 27   BUN mg/dL 18   CREATININE mg/dL 0.79   CALCIUM mg/dL 9.6   ALK PHOS U/L 64   ALT U/L 17   AST U/L 18           Imaging: I have personally reviewed pertinent reports.      CT abdomen pelvis wo contrast    Result Date: 5/4/2024  Narrative: CT ABDOMEN  AND PELVIS WITHOUT IV CONTRAST INDICATION: epigastric pain, diffuse pain. no contrast 2/2 fear of alzheimers and contrast refuses. COMPARISON: Multiple priors most recently 12/9/2022 TECHNIQUE: CT examination of the abdomen and pelvis was performed without intravenous contrast. Multiplanar 2D reformatted images were created from the source data. This examination, like all CT scans performed in the [facility], was performed utilizing techniques to minimize radiation dose exposure, including the use of iterative reconstruction and automated exposure control. Radiation dose length product (DLP) for this visit: 434 mGy-cm Enteric Contrast: Not administered. FINDINGS: ABDOMEN LOWER CHEST: No clinically significant abnormality in the visualized lower chest. LIVER/BILIARY TREE: Simple hepatic cyst(s). No suspicious mass. Normal hepatic contours. No biliary dilation. GALLBLADDER: No calcified gallstones. No pericholecystic inflammatory change. SPLEEN: Unremarkable. PANCREAS: Unremarkable. ADRENAL GLANDS: Unremarkable. KIDNEYS/URETERS: Unremarkable. No hydronephrosis. STOMACH AND BOWEL: No evidence of bowel obstruction. Colonic diverticulosis. Duodenal diverticulum APPENDIX: No findings to suggest appendicitis. ABDOMINOPELVIC CAVITY: No ascites. No pneumoperitoneum. No lymphadenopathy. VESSELS: Unremarkable for patient's age. PELVIS REPRODUCTIVE ORGANS: Unremarkable for patient's age. URINARY BLADDER: Unremarkable. ABDOMINAL WALL/INGUINAL REGIONS: Unremarkable. BONES: No acute fracture or suspicious osseous lesion. Tarlov cysts.     Impression: No acute findings in the abdomen or pelvis within the limits of unenhanced technique. Workstation performed: [id number]     Colonoscopy    Result Date: 4/11/2024  Narrative: Table formatting from the original result was not included. [address] [address] [phone] DATE OF SERVICE: 4/11/24 PHYSICIAN(S): Attending: No  Staff Documented Fellow: No Staff Documented INDICATION: Villous adenoma polyp of rectum POST-OP DIAGNOSIS: See the impression below. HISTORY: Prior colonoscopy: Less than 3 years ago. It is being repeated at an interval of less than 3 years because: Piecemeal removal of polyps BOWEL PREPARATION: Golytely/Colyte/Trilyte PREPROCEDURE: Informed consent was obtained for the procedure, including sedation. Risks including but not limited to bleeding, infection, perforation, adverse drug reaction and aspiration were explained in detail. Also explained about less than 100% sensitivity with the exam and other alternatives. The patient was placed in the left lateral decubitus position. Procedure: Colonoscopy DETAILS OF PROCEDURE: Patient was taken to the procedure room where a time out was performed to confirm correct patient and correct procedure. The patient underwent monitored anesthesia care, which was administered by an anesthesia professional. The patient's blood pressure, heart rate, level of consciousness, oxygen, respirations, ECG and ETCO2 were monitored throughout the procedure. A digital rectal exam was performed. The scope was introduced through the anus and advanced to the cecum. Retroflexion was performed in the rectum. The quality of bowel preparation was evaluated using the [place] Bowel Preparation Scale with scores of: right colon = 2, transverse colon = 2, left colon = 2. The total BBPS score was 6. Bowel prep was adequate. The patient's estimated blood loss was minimal (<5 mL). The procedure was not difficult. The patient tolerated the procedure well. There were no apparent adverse events. ANESTHESIA INFORMATION: ASA: III Anesthesia Type: IV Sedation with Anesthesia MEDICATIONS: No administrations occurring from 0854 to 0943 on 04/11/24 FINDINGS: Two sessile polyps measuring smaller than 5 mm in the ascending colon; performed cold forceps biopsy One sessile polyp measuring smaller than 5 mm in the distal  rectum; performed hot snare removal One flat polyp measuring 5-9 mm in the distal rectum; lift performed with eleview injected into the submucosa; removed en bloc by EMR and retrieved specimen. EMR was performed with a hot snare. Post-procedure bleeding was not visualized Diverticula in the descending colon and sigmoid colon EVENTS: Procedure Events Event Event Time ENDO CECUM REACHED 4/11/2024  9:24 AM SPECIMENS: ID Type Source Tests Collected by Time Destination 1 : Antrum bxs-check for dysplasia Tissue Stomach TISSUE EXAM Brett Hawley MD 4/11/2024  9:15 AM  2 : ascending polyp-cold bx Tissue Colon TISSUE EXAM Brett Hawley MD 4/11/2024  9:25 AM  EQUIPMENT: Colonoscope -CF [id number]     Impression: 3 subcentimeter polyps were removed One polyp measuring 5-9 mm in the distal rectum; lift performed; removed by EMR Diverticulosis in the descending colon and sigmoid colon RECOMMENDATION:  Await pathology results  Repeat colonoscopy in 3 years  Personal history of colon polyps    No Staff Documented     EGD    Result Date: 4/11/2024  Narrative: Table formatting from the original result was not included. [address] [address] [phone] DATE OF SERVICE: 4/11/24 PHYSICIAN(S): Attending: No Staff Documented Fellow: No Staff Documented INDICATION: Nausea and vomiting, unspecified vomiting type, history of gastric intestinal metaplasia POST-OP DIAGNOSIS: See the impression below. PREPROCEDURE: Informed consent was obtained for the procedure, including sedation.  Risks of perforation, hemorrhage, adverse drug reaction and aspiration were discussed. The patient was placed in the left lateral decubitus position. Patient was explained about the risks and benefits of the procedure. Risks including but not limited to bleeding, infection, and perforation were explained in detail. Also explained about less than 100% sensitivity with the exam and other alternatives. PROCEDURE:  "EGD DETAILS OF PROCEDURE: Patient was taken to the procedure room where a time out was performed to confirm correct patient and correct procedure. The patient underwent monitored anesthesia care, which was administered by an anesthesia professional. The patient's blood pressure, heart rate, level of consciousness, respirations, oxygen, ECG and ETCO2 were monitored throughout the procedure. The scope was introduced through the mouth and advanced to the second part of the duodenum. Retroflexion was performed in the fundus. The patient's estimated blood loss was minimal (<5 mL). The procedure was not difficult. The patient tolerated the procedure well. There were no apparent adverse events. ANESTHESIA INFORMATION: ASA: III Anesthesia Type: IV Sedation with Anesthesia MEDICATIONS: No administrations occurring from 0854 to 0911 on 04/11/24 FINDINGS: The esophagus appeared normal. Z-line is 40 cm from the incisors. Erythematous mucosa with erosion in the antrum; performed cold forceps biopsy for dysplasia screening The duodenum appeared normal. SPECIMENS: * No specimens in log *     Impression: The esophagus appeared normal. Erythematous mucosa with erosion in the antrum; performed cold forceps biopsy The duodenum appeared normal. RECOMMENDATION:  Await pathology results  Follow up with GI Clinic   No Staff Documented       CT abdomen pelvis wo contrast   Final Result      No acute findings in the abdomen or pelvis within the limits of unenhanced technique.      Workstation performed: [employer]             EKG, Pathology, and Other Studies Reviewed on Admission:   EKG reveals sinus bradycardia at 59 bpm, no acute ST-T wave changes noted.    Allscripts/EPIC Records Reviewed: Yes     ** Please Note: \"This note has been constructed using a voice recognition system.Therefore there may be syntax, spelling, and/or grammatical errors. Please call if you have any questions. \"**    "

## 2024-05-05 VITALS
RESPIRATION RATE: 16 BRPM | HEIGHT: 60 IN | HEART RATE: 57 BPM | BODY MASS INDEX: 23.01 KG/M2 | TEMPERATURE: 98.1 F | OXYGEN SATURATION: 98 % | WEIGHT: 117.2 LBS | SYSTOLIC BLOOD PRESSURE: 124 MMHG | DIASTOLIC BLOOD PRESSURE: 70 MMHG

## 2024-05-05 LAB
ALBUMIN SERPL BCP-MCNC: 3.8 G/DL (ref 3.5–5)
ALP SERPL-CCNC: 56 U/L (ref 34–104)
ALT SERPL W P-5'-P-CCNC: 14 U/L (ref 7–52)
ANION GAP SERPL CALCULATED.3IONS-SCNC: 7 MMOL/L (ref 4–13)
AST SERPL W P-5'-P-CCNC: 16 U/L (ref 13–39)
BILIRUB SERPL-MCNC: 0.73 MG/DL (ref 0.2–1)
BUN SERPL-MCNC: 20 MG/DL (ref 5–25)
CALCIUM SERPL-MCNC: 9.1 MG/DL (ref 8.4–10.2)
CHLORIDE SERPL-SCNC: 105 MMOL/L (ref 96–108)
CO2 SERPL-SCNC: 27 MMOL/L (ref 21–32)
CREAT SERPL-MCNC: 0.72 MG/DL (ref 0.6–1.3)
ERYTHROCYTE [DISTWIDTH] IN BLOOD BY AUTOMATED COUNT: 13.3 % (ref 11.6–15.1)
GFR SERPL CREATININE-BSD FRML MDRD: 78 ML/MIN/1.73SQ M
GLUCOSE SERPL-MCNC: 88 MG/DL (ref 65–140)
HCT VFR BLD AUTO: 39.1 % (ref 34.8–46.1)
HGB BLD-MCNC: 12.8 G/DL (ref 11.5–15.4)
LIPASE SERPL-CCNC: 10 U/L (ref 11–82)
MAGNESIUM SERPL-MCNC: 2.1 MG/DL (ref 1.9–2.7)
MCH RBC QN AUTO: 29.3 PG (ref 26.8–34.3)
MCHC RBC AUTO-ENTMCNC: 32.7 G/DL (ref 31.4–37.4)
MCV RBC AUTO: 90 FL (ref 82–98)
PLATELET # BLD AUTO: 232 THOUSANDS/UL (ref 149–390)
PMV BLD AUTO: 10.8 FL (ref 8.9–12.7)
POTASSIUM SERPL-SCNC: 4 MMOL/L (ref 3.5–5.3)
PROT SERPL-MCNC: 6.2 G/DL (ref 6.4–8.4)
RBC # BLD AUTO: 4.37 MILLION/UL (ref 3.81–5.12)
SODIUM SERPL-SCNC: 139 MMOL/L (ref 135–147)
WBC # BLD AUTO: 6.9 THOUSAND/UL (ref 4.31–10.16)

## 2024-05-05 PROCEDURE — 99239 HOSP IP/OBS DSCHRG MGMT >30: CPT | Performed by: INTERNAL MEDICINE

## 2024-05-05 PROCEDURE — 83690 ASSAY OF LIPASE: CPT | Performed by: INTERNAL MEDICINE

## 2024-05-05 PROCEDURE — 80053 COMPREHEN METABOLIC PANEL: CPT | Performed by: INTERNAL MEDICINE

## 2024-05-05 PROCEDURE — 83735 ASSAY OF MAGNESIUM: CPT | Performed by: INTERNAL MEDICINE

## 2024-05-05 PROCEDURE — 85027 COMPLETE CBC AUTOMATED: CPT | Performed by: INTERNAL MEDICINE

## 2024-05-05 RX ADMIN — VITAM B12 100 MCG: 100 TAB at 08:41

## 2024-05-05 RX ADMIN — Medication 1000 UNITS: at 08:41

## 2024-05-05 NOTE — ASSESSMENT & PLAN NOTE
Troponin noted to be 10-28-36  EKG without any acute abnormality, patient without any cardiorespiratory symptoms  Patient reports being very active with good exercise tolerance without any cardiorespiratory symptoms  Likely secondary to uncontrolled hypertension/hypertensive urgency  Troponin subsequently trended down with improvement in blood pressure  Blood pressure improved  Stress test as outpatient  Follow-up with cardiology next week as scheduled  Recommended to monitor blood pressure

## 2024-05-05 NOTE — PLAN OF CARE
Problem: PAIN - ADULT  Goal: Verbalizes/displays adequate comfort level or baseline comfort level  Description: Interventions:  - Encourage patient to monitor pain and request assistance  - Assess pain using appropriate pain scale  - Administer analgesics based on type and severity of pain and evaluate response  - Implement non-pharmacological measures as appropriate and evaluate response  - Consider cultural and social influences on pain and pain management  - Notify physician/advanced practitioner if interventions unsuccessful or patient reports new pain  Outcome: Progressing     Problem: INFECTION - ADULT  Goal: Absence or prevention of progression during hospitalization  Description: INTERVENTIONS:  - Assess and monitor for signs and symptoms of infection  - Monitor lab/diagnostic results  - Monitor all insertion sites, i.e. indwelling lines, tubes, and drains  - Monitor endotracheal if appropriate and nasal secretions for changes in amount and color  - [place] appropriate cooling/warming therapies per order  - Administer medications as ordered  - Instruct and encourage patient and family to use good hand hygiene technique  - Identify and instruct in appropriate isolation precautions for identified infection/condition  Outcome: Progressing  Goal: Absence of fever/infection during neutropenic period  Description: INTERVENTIONS:  - Monitor WBC    Outcome: Progressing     Problem: SAFETY ADULT  Goal: Patient will remain free of falls  Description: INTERVENTIONS:  - Educate patient/family on patient safety including physical limitations  - Instruct patient to call for assistance with activity   - Consult OT/PT to assist with strengthening/mobility   - Keep Call bell within reach  - Keep bed low and locked with side rails adjusted as appropriate  - Keep care items and personal belongings within reach  - Initiate and maintain comfort rounds  - Make Fall Risk Sign visible to staff  - Offer Toileting every 4 Hours,  in advance of need  - Initiate/Maintain bed/chair alarm  - Apply yellow socks and bracelet for high fall risk patients  - Consider moving patient to room near nurses station  Outcome: Progressing  Goal: Maintain or return to baseline ADL function  Description: INTERVENTIONS:  -  Assess patient's ability to carry out ADLs; assess patient's baseline for ADL function and identify physical deficits which impact ability to perform ADLs (bathing, care of mouth/teeth, toileting, grooming, dressing, etc.)  - Assess/evaluate cause of self-care deficits   - Assess range of motion  - Assess patient's mobility; develop plan if impaired  - Assess patient's need for assistive devices and provide as appropriate  - Encourage maximum independence but intervene and supervise when necessary  - Involve family in performance of ADLs  - Assess for home care needs following discharge   - Consider OT consult to assist with ADL evaluation and planning for discharge  - Provide patient education as appropriate  Outcome: Progressing  Goal: Maintains/Returns to pre admission functional level  Description: INTERVENTIONS:  - Perform AM-PAC 6 Click Basic Mobility/ Daily Activity assessment daily.  - Set and communicate daily mobility goal to care team and patient/family/caregiver.   - Collaborate with rehabilitation services on mobility goals if consulted  - Ambulate patient 3 times a day  - Out of bed to chair 3 times a day   - Out of bed for meals 3 times a day  - Out of bed for toileting  - Record patient progress and toleration of activity level   Outcome: Progressing     Problem: DISCHARGE PLANNING  Goal: Discharge to home or other facility with appropriate resources  Description: INTERVENTIONS:  - Identify barriers to discharge w/patient and caregiver  - Arrange for needed discharge resources and transportation as appropriate  - Identify discharge learning needs (meds, wound care, etc.)  - Arrange for interpretive services to assist at  discharge as needed  - Refer to Case Management Department for coordinating discharge planning if the patient needs post-hospital services based on physician/advanced practitioner order or complex needs related to functional status, cognitive ability, or social support system  Outcome: Progressing     Problem: Knowledge Deficit  Goal: Patient/family/caregiver demonstrates understanding of disease process, treatment plan, medications, and discharge instructions  Description: Complete learning assessment and assess knowledge base.  Interventions:  - Provide teaching at level of understanding  - Provide teaching via preferred learning methods  Outcome: Progressing

## 2024-05-05 NOTE — ASSESSMENT & PLAN NOTE
Likely in setting of pain, anxiety.  Repeat history of whitecoat hypertension  Checks blood pressure often and reports being normotensive at home  S/p labetalol in ED, blood pressure trend improving spontaneously  Blood pressure gradually improved during hospitalization  Symptomatic treatment  Follow-up with PCP and cardiology for further blood pressure monitoring  Continue diet lifestyle modification

## 2024-05-05 NOTE — ED PROVIDER NOTES
Final Diagnosis:  1. Epigastric pain    2. NSTEMI (non-ST elevated myocardial infarction) (HCC)    3. High level of cardiac marker        Chief Complaint   Patient presents with    Abdominal Pain     Patient complains of mid upper abdominal pain starting yesterday afternoon. No N/V/D. Patient states the area is even tender to touch.       HPI  Patient presents w/ epigastric pain. Started yest. Been occurring periodically but this worse. She notes tender to touch but soft on my excam. Prior appendectomy. No obvious relation to eating. Tonight at rest. No n/v. Normal stool this evening. No vaginal bleeding/dc. No urianry symptoms. Denies chest pain shoudler pain neck pain back pain.     EMS additionally reports:     - Previous charting underwent limited review with attention to last ED visits, labs, ekgs, and prior imaging.  Chart review reveals :     Hospital Outpatient Visit on 04/11/2024   Component Date Value Ref Range Status    Case Report 04/11/2024    Final                    Value:Surgical Pathology Report                         Case: [id number]                                  Authorizing Provider:  Brett Hawley MD         Collected:           04/11/2024 0915              Ordering Location:     [facility]   Received:            04/11/2024 1720                                     Center                                                                       Pathologist:           Rajinder Cortez MD                                                         Specimens:   A) - Stomach, Antrum bxs-check for dysplasia                                                        B) - Colon, ascending polyp-cold bx                                                                 C) - Colon, Rectal polyps x2- hot snares                                                   Final Diagnosis 04/11/2024    Final                    Value:This result contains rich text formatting which cannot be displayed here.     Additional Information 04/11/2024    Final                    Value:This result contains rich text formatting which cannot be displayed here.    Synoptic Checklist 04/11/2024    Final                    Value:                            COLON/RECTUM POLYP FORM - GI - All Specimens                                                                                     :    Adenoma(s)      Gross Description 04/11/2024    Final                    Value:This result contains rich text formatting which cannot be displayed here.       - No language barrier.   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    PSH:   has a past surgical history that includes Esophagogastroduodenoscopy (12/2015); Appendectomy; Tonsillectomy and adenoidectomy; Cystoscopy; Foot surgery (Bilateral); Tubal ligation; Colonoscopy; pr xcapsl ctrc rmvl insj io lens prosth w/o ecp (Left, 07/21/2016); pr xcapsl ctrc rmvl insj io lens prosth w/o ecp (Right, 06/23/2016); pr egd transoral biopsy single/multiple (N/A, 08/09/2017); Adrenalectomy (Left, 2010); ADENOIDECTOMY; Upper gastrointestinal endoscopy; Cataract extraction (Bilateral); and Bunionectomy (Bilateral).     Social History:  Tobacco Use: Low Risk  (5/4/2024)    Patient History     Smoking Tobacco Use: Never     Smokeless Tobacco Use: Never     Passive Exposure: Not on file     Alcohol Use: Not At Risk (5/24/2021)    AUDIT-C     Frequency of Alcohol Consumption: Never     Average Number of Drinks: Not on file      Frequency of Binge Drinking: Not on file     No illicit use       ROS:  Pertinent positives/negatives: .     Some ROS may be present in the HPI and would take precedent over these standard questions asked below.   Review of Systems   Constitutional:  Negative for chills and fever.   Respiratory:  Negative for cough, shortness of breath, wheezing and stridor.    Cardiovascular:  Negative for chest pain, palpitations and leg swelling.   Gastrointestinal:  Positive for abdominal pain. Negative for constipation, diarrhea, nausea and vomiting.   Genitourinary:  Negative for dysuria.        CONSTITUTIONAL:  No lethargy. No unexpected weight loss. No change in behavior.  EYES:  No pain, redness, or discharge. No loss of vision. No orbital trauma or pain.   ENT:  No tinnitus or decreased hearing. No epistaxis/purulent rhinorrhea. No voice change, airway closing, trismus.   CARDIOVASCULAR:  No chest pain. No skin mottling or pallor. No change in exertional capacity  RESPIRATORY:  No hemoptysis. No paroxysmal nocturnal dyspnea. No stridor. No apnea or bluing.   GASTROINTESTINAL:  No vomiting, diarrhea. No distension. No melena. No hematochezia.     MUSCULOSKELETAL:  No contracture.  No new deformity.   INTEGUMENTARY:  No swelling. No unexpected contusions. No abrasions. No lymphangitis.  NEUROLOGIC:  No meningismus. No new numbness of the extremities. No new focal weakness. No postural instability  PSYCHIATRIC:  No SI HI AVH  HEMATOLOGICAL:  No bleeding. No petechiae. No bruising.  ALLERGIES:  No urticaria. No sudden abd cramping. No stridor.    PE:     Physical exam highlights:   Physical Exam       Vitals:    05/04/24 1133 05/04/24 1528 05/04/24 1807 05/04/24 2200   BP: 160/82 146/79 142/79 125/59   BP Location:       Pulse: 62 65 66 55   Resp: 17 18 18 18   Temp: 97.5 °F (36.4 °C) (!) 97.3 °F (36.3 °C) 97.7 °F (36.5 °C) 98.1 °F (36.7 °C)   TempSrc: Oral      SpO2: 99% 99% 97% 98%   Weight: 53.3 kg (117 lb 6.4 oz)       Height: 5' (1.524 m)        Vitals reviewed by me.   Nursing note reviewed  Chaperone present for all sensitive exam.  Const: No acute distress. Alert. Nontoxic. Not diaphoretic.    HEENT: External ears normal. No protrusion drainage swelling. Nose normal. No drainage/traumatic deformity. MM. Mouth with baseline/symmetric movement. No trismus.   Eyes: No squinting. No icterus. No tearing/swelling/drainage. Tracks through the room with normal EOM.   Neck: ROM normal. No rigidity. No meningismus.  Cards: Rate as per vitals Compared to monitor sinus unless documented. Regular Well perfused.  Pulm: Effort and excursion normal. No distress. No audible wheezing/no stridor. Normal resp rate without retraction or change in work of breathing. CTAB  Abd: No distension beyond baseline. No fluctuant wave. Patient without peritoneal pain with shifting/bumping the bed. Soft x 4  MSK: ROM normal baseline. No deformity. No contractures from baseline.   Skin: No new rashes visible. Well perfused. No wounds visualized on exposed skin  Neuro: Nonfocal. Baseline. CN grossly intact. Moving all four with coordination.   Psych: Normal behavior and affect.        A:  - Nursing note reviewed.    Ddx and MDM  Considered diagnoses    PUD?  Given maalox pepcid    Atypical ACS?  Trop  EKG  Delta - elev  4 hr - continues to climb  Admit obs tele    Panc/ lipase  None    Radha?  Hepatic fxn  CT ab pelv  None          Dispo decision       My conversation with consultant reveals:        Decision rules:                    ED Course as of 05/05/24 0011   Sat May 04, 2024   0553 Procedure Note: EKG  Date/Time: 05/04/24 5:53 AM   Interpreted by: Yogesh Olmstead  Indications / Diagnosis: epigastric pain   ECG reviewed by me, the ED Provider: yes   The EKG demonstrates:  Rhythm: sinus  austyn   Intervals: normal intervals  Axis: normal axis  QRS/Blocks: normal QRS  ST Changes: No acute ST Changes, no STD/HOLLIS.  T wave changes: none             My read of  "the XR/CT scan reveals:     CT abdomen pelvis wo contrast   Final Result      No acute findings in the abdomen or pelvis within the limits of unenhanced technique.      Workstation performed: [id number]             Orders Placed This Encounter   Procedures    CT abdomen pelvis wo contrast    CBC and differential    Comprehensive metabolic panel    Lipase    HS Troponin 0hr (reflex protocol)    HS Troponin I 2hr    HS Troponin I 4hr    HS Troponin 0hr (reflex protocol)    HS Troponin I 2hr    CBC    Comprehensive metabolic panel    CK    Magnesium    Lipase    Diet Cardiovascular; Cardiac; Non Ulcerogenic    Nursing communication Continue IV as ordered.    Notify admitting physician    Notify admitting physician on arrival    24 Hour Telemetry Monitoring    Vital Signs per unit routine    Up and OOB as tolerated    Continuous ST Segment Monitoring    Nursing communication ECG 12 lead with chest pain or ST segment change and notify MD. Place an ECG order if performed.    I/O    Daily weights- Use standing scale to weigh patient    Insert peripheral IV    Maintain IV access    Incentive spirometry    Aqua K - apply for 20 minutes    Monitor Skin integrity, color, and character prior to and after application    Level 1-Full Code: all life saving measures are indicated    ECG 12 lead    ECG 12 lead every 30 minutes x 2 PRN if first ECG non-diagnostic and persistent CP    Place in Observation     Labs Reviewed   HS TROPONIN I 4HR - Abnormal       Result Value Ref Range Status    hs TnI 4hr 36  \"Refer to ACS Flowchart\"- see link ng/L Final    Comment:                                              Initial (time 0) result  If >=50 ng/L, Myocardial injury suggested ;  Type of myocardial injury and treatment strategy  to be determined.  If 5-49 ng/L, a delta result at 2 hours and or 4 hours will be needed to further evaluate.  If <4 ng/L, and chest pain has been >3 hours since onset, patient may qualify for discharge based on " "the HEART score in the ED.  If <5 ng/L and <3hours since onset of chest pain, a delta result at 2 hours will be needed to further evaluate.    HS Troponin 99th Percentile URL of a Health Population=12 ng/L with a 95% Confidence Interval of 8-18 ng/L.    Second Troponin (time 2 hours)  If calculated delta >= 20 ng/L,  Myocardial injury suggested ; Type of myocardial injury and treatment strategy to be determined.  If 5-49 ng/L and the calculated delta is 5-19 ng/L, consult medical service for evaluation.  Continue evaluation for ischemia on ecg and other possible etiology and repeat hs troponin at 4 hours.  If delta is <5 ng/L at 2 hours, consider discharge based on risk stratification via the HEART score (if in ED), or MARY risk score in IP/Observation.    HS Troponin 99th Percentile URL of a Health Population=12 ng/L with a 95% Confidence Interval of 8-18 ng/L.    Delta 4hr hsTnI 26 (*) <20 ng/L Final   LIPASE - Normal    Lipase 14  11 - 82 u/L Final   HS TROPONIN I 0HR - Normal    hs TnI 0hr 10  \"Refer to ACS Flowchart\"- see link ng/L Final    Comment:                                              Initial (time 0) result  If >=50 ng/L, Myocardial injury suggested ;  Type of myocardial injury and treatment strategy  to be determined.  If 5-49 ng/L, a delta result at 2 hours and or 4 hours will be needed to further evaluate.  If <4 ng/L, and chest pain has been >3 hours since onset, patient may qualify for discharge based on the HEART score in the ED.  If <5 ng/L and <3hours since onset of chest pain, a delta result at 2 hours will be needed to further evaluate.    HS Troponin 99th Percentile URL of a Health Population=12 ng/L with a 95% Confidence Interval of 8-18 ng/L.    Second Troponin (time 2 hours)  If calculated delta >= 20 ng/L,  Myocardial injury suggested ; Type of myocardial injury and treatment strategy to be determined.  If 5-49 ng/L and the calculated delta is 5-19 ng/L, consult medical service for " "evaluation.  Continue evaluation for ischemia on ecg and other possible etiology and repeat hs troponin at 4 hours.  If delta is <5 ng/L at 2 hours, consider discharge based on risk stratification via the HEART score (if in ED), or MARY risk score in IP/Observation.    HS Troponin 99th Percentile URL of a Health Population=12 ng/L with a 95% Confidence Interval of 8-18 ng/L.   HS TROPONIN I 2HR - Normal    hs TnI 2hr 28  \"Refer to ACS Flowchart\"- see link ng/L Final    Comment:                                              Initial (time 0) result  If >=50 ng/L, Myocardial injury suggested ;  Type of myocardial injury and treatment strategy  to be determined.  If 5-49 ng/L, a delta result at 2 hours and or 4 hours will be needed to further evaluate.  If <4 ng/L, and chest pain has been >3 hours since onset, patient may qualify for discharge based on the HEART score in the ED.  If <5 ng/L and <3hours since onset of chest pain, a delta result at 2 hours will be needed to further evaluate.    HS Troponin 99th Percentile URL of a Health Population=12 ng/L with a 95% Confidence Interval of 8-18 ng/L.    Second Troponin (time 2 hours)  If calculated delta >= 20 ng/L,  Myocardial injury suggested ; Type of myocardial injury and treatment strategy to be determined.  If 5-49 ng/L and the calculated delta is 5-19 ng/L, consult medical service for evaluation.  Continue evaluation for ischemia on ecg and other possible etiology and repeat hs troponin at 4 hours.  If delta is <5 ng/L at 2 hours, consider discharge based on risk stratification via the HEART score (if in ED), or MARY risk score in IP/Observation.    HS Troponin 99th Percentile URL of a Health Population=12 ng/L with a 95% Confidence Interval of 8-18 ng/L.    Delta 2hr hsTnI 18  <20 ng/L Final   CBC AND DIFFERENTIAL    WBC 7.32  4.31 - 10.16 Thousand/uL Final    RBC 4.90  3.81 - 5.12 Million/uL Final    Hemoglobin 14.3  11.5 - 15.4 g/dL Final    Hematocrit 43.6  34.8 " - 46.1 % Final    MCV 89  82 - 98 fL Final    MCH 29.2  26.8 - 34.3 pg Final    MCHC 32.8  31.4 - 37.4 g/dL Final    RDW 13.2  11.6 - 15.1 % Final    MPV 10.7  8.9 - 12.7 fL Final    Platelets 253  149 - 390 Thousands/uL Final    nRBC 0  /100 WBCs Final    Segmented % 61  43 - 75 % Final    Immature Grans % 0  0 - 2 % Final    Lymphocytes % 30  14 - 44 % Final    Monocytes % 7  4 - 12 % Final    Eosinophils Relative 1  0 - 6 % Final    Basophils Relative 1  0 - 1 % Final    Absolute Neutrophils 4.49  1.85 - 7.62 Thousands/µL Final    Absolute Immature Grans 0.01  0.00 - 0.20 Thousand/uL Final    Absolute Lymphocytes 2.20  0.60 - 4.47 Thousands/µL Final    Absolute Monocytes 0.48  0.17 - 1.22 Thousand/µL Final    Eosinophils Absolute 0.10  0.00 - 0.61 Thousand/µL Final    Basophils Absolute 0.04  0.00 - 0.10 Thousands/µL Final   COMPREHENSIVE METABOLIC PANEL    Sodium 140  135 - 147 mmol/L Final    Potassium 3.6  3.5 - 5.3 mmol/L Final    Chloride 103  96 - 108 mmol/L Final    CO2 27  21 - 32 mmol/L Final    ANION GAP 10  4 - 13 mmol/L Final    BUN 18  5 - 25 mg/dL Final    Creatinine 0.79  0.60 - 1.30 mg/dL Final    Comment: Standardized to IDMS reference method    Glucose 111  65 - 140 mg/dL Final    Comment: If the patient is fasting, the ADA then defines impaired fasting glucose as > 100 mg/dL and diabetes as > or equal to 123 mg/dL.    Calcium 9.6  8.4 - 10.2 mg/dL Final    AST 18  13 - 39 U/L Final    ALT 17  7 - 52 U/L Final    Comment: Specimen collection should occur prior to Sulfasalazine administration due to the potential for falsely depressed results.     Alkaline Phosphatase 64  34 - 104 U/L Final    Total Protein 7.1  6.4 - 8.4 g/dL Final    Albumin 4.4  3.5 - 5.0 g/dL Final    Total Bilirubin 0.57  0.20 - 1.00 mg/dL Final    Comment: Use of this assay is not recommended for patients undergoing treatment with eltrombopag due to the potential for falsely elevated results.  N-acetyl-p-benzoquinone imine  (metabolite of Acetaminophen) will generate erroneously low results in samples for patients that have taken an overdose of Acetaminophen.    eGFR 70  ml/min/1.73sq m Final    Narrative:     National Kid[employer] guidelines for Chronic Kidney Disease (CKD):     Stage 1 with normal or high GFR (GFR > 90 mL/min/1.73 square meters)    Stage 2 Mild CKD (GFR = 60-89 mL/min/1.73 square meters)    Stage 3A Moderate CKD (GFR = 45-59 mL/min/1.73 square meters)    Stage 3B Moderate CKD (GFR = 30-44 mL/min/1.73 square meters)    Stage 4 Severe CKD (GFR = 15-29 mL/min/1.73 square meters)    Stage 5 End Stage CKD (GFR <15 mL/min/1.73 square meters)  Note: GFR calculation is accurate only with a steady state creatinine       *Each of these labs was reviewed. Particular standout labs will be noted in the ED Course above     Final Diagnosis:  1. Epigastric pain    2. NSTEMI (non-ST elevated myocardial infarction) (HCC)    3. High level of cardiac marker          P:  - hospital tx includes   Medications   enoxaparin (LOVENOX) subcutaneous injection 40 mg (has no administration in time range)   cyanocobalamin (VITAMIN B-12) tablet 100 mcg (100 mcg Oral Given 5/4/24 1435)   Cholecalciferol (VITAMIN D3) tablet 1,000 Units (1,000 Units Oral Given 5/4/24 1434)   acetaminophen (TYLENOL) tablet 650 mg (650 mg Oral Not Given 5/4/24 1826)   famotidine (PEPCID) tablet 20 mg (20 mg Oral Given 5/4/24 0545)   aluminum-magnesium hydroxide-simethicone (MAALOX) oral suspension 30 mL (30 mL Oral Given 5/4/24 0545)   aspirin chewable tablet 324 mg (324 mg Oral Given 5/4/24 0736)   labetalol (NORMODYNE) injection 10 mg (10 mg Intravenous Given 5/4/24 0736)         - disposition  Time reflects when diagnosis was documented in both MDM as applicable and the Disposition within this note       Time User Action Codes Description Comment    5/4/2024  4:59 AM Yogesh Olmstead [R10.13] Epigastric pain     5/4/2024  7:24 AM Yogesh Olmstead  [I21.4] NSTEMI (non-ST elevated myocardial infarction) (HCC)     5/4/2024  9:36 AM Edouard Santillan Add [I48.0] Paroxysmal atrial fibrillation (HCC)     5/4/2024  9:36 AM Edouard Santillan Remove [I48.0] Paroxysmal atrial fibrillation (HCC)     5/4/2024  9:36 AM Edouard Santillan Add [R74.8] High level of cardiac marker           ED Disposition       ED Disposition   Admit    Condition   Stable    Date/Time   Sat May 4, 2024  7:24 AM    Comment   Case was discussed with norman and the patient's admission status was agreed to be Admission Status: observation status to the service of Dr. Santillan .               Follow-up Information    None         - patient will call their PCP to let them know they were in the emergency department. We discuss return precautions and patient is agreeable with plan and aformentioned disposition.       - additional treatment intended, if consistent with primary provider:  - patient to follow with :      Current Discharge Medication List        START taking these medications    Details   aluminum-magnesium hydroxide-simethicone (MAALOX) 200-200-20 MG/5ML SUSP Take 30 mL by mouth 4 (four) times a day (before meals and at bedtime) for 7 days  Qty: 840 mL, Refills: 0    Associated Diagnoses: Epigastric pain      famotidine (PEPCID) 20 mg tablet Take 1 tablet (20 mg total) by mouth 2 (two) times a day  Qty: 30 tablet, Refills: 0    Associated Diagnoses: Epigastric pain           CONTINUE these medications which have NOT CHANGED    Details   cholecalciferol (VITAMIN D3) 1,000 units tablet Take 1,000 Units by mouth daily      cyanocobalamin (VITAMIN B-12) 100 mcg tablet Take by mouth daily      MAGNESIUM PO Take 500 mg by mouth every morning       Potassium Gluconate 2.5 MEQ TABS Take 99 mg by mouth      metoprolol succinate (TOPROL-XL) 25 mg 24 hr tablet Take 25 mg by mouth daily Patient started taking 1/2 tablet daily in evening on 10/3           No discharge procedures on file.  Prior to  "Admission Medications   Prescriptions Last Dose Informant Patient Reported? Taking?   MAGNESIUM PO 5/3/2024 Self Yes Yes   Sig: Take 500 mg by mouth every morning    Potassium Gluconate 2.5 MEQ TABS 5/3/2024 Self Yes Yes   Sig: Take 99 mg by mouth   cholecalciferol (VITAMIN D3) 1,000 units tablet 5/3/2024 Self Yes Yes   Sig: Take 1,000 Units by mouth daily   cyanocobalamin (VITAMIN B-12) 100 mcg tablet 5/3/2024 Self Yes Yes   Sig: Take by mouth daily   metoprolol succinate (TOPROL-XL) 25 mg 24 hr tablet  Self Yes No   Sig: Take 25 mg by mouth daily Patient started taking 1/2 tablet daily in evening on 10/3      Facility-Administered Medications: None       Portions of the record may have been created with voice recognition software. Occasional wrong word or \"sound a like\" substitutions may have occurred due to the inherent limitations of voice recognition software. Read the chart carefully and recognize, using context, where substitutions have occurred.    Electronically signed by:  MD Yogesh Mireles MD  05/05/24 0013    "

## 2024-05-05 NOTE — DISCHARGE SUMMARY
Discharge Summary - [facility] Internal Medicine    Patient Information: Barbara Moreno 83 y.o. female MRN: [id number]  Unit/Bed#: [address] [place] [address] Encounter: [id number]    Discharging Physician / Practitioner: Edouard Santillan MD  PCP: Etta Saucedo MD  Admission Date: 5/4/2024  Discharge Date: 05/05/24    Reason for Admission: Abdominal Pain (Patient complains of mid upper abdominal pain starting yesterday afternoon. No N/V/D. Patient states the area is even tender to touch.)      Discharge Diagnoses:     Principal Problem:    Abdominal pain  Active Problems:    Troponin I above reference range    Elevated blood pressure reading    Paroxysmal atrial fibrillation (HCC)    Munoz's esophagus with dysplasia    Dyslipidemia  Resolved Problems:    * No resolved hospital problems. *        Elevated blood pressure reading  Assessment & Plan  Likely in setting of pain, anxiety.  Repeat history of whitecoat hypertension  Checks blood pressure often and reports being normotensive at home  S/p labetalol in ED, blood pressure trend improving spontaneously  Blood pressure gradually improved during hospitalization  Symptomatic treatment  Follow-up with PCP and cardiology for further blood pressure monitoring  Continue diet lifestyle modification    Troponin I above reference range  Assessment & Plan  Troponin noted to be 10-28-36  EKG without any acute abnormality, patient without any cardiorespiratory symptoms  Patient reports being very active with good exercise tolerance without any cardiorespiratory symptoms  Likely secondary to uncontrolled hypertension/hypertensive urgency  Troponin subsequently trended down with improvement in blood pressure  Blood pressure improved  Stress test as outpatient  Follow-up with cardiology next week as scheduled  Recommended to monitor blood pressure    * Abdominal pain  Assessment & Plan  Presented with mid and upper abdominal discomfort since 1 day worse with palpation and movement  without any radiation.  Patient does report that she started doing abdominal muscle exercises a day prior, she used to do in the past but had not done for long time until a day prior  Afebrile vital signs stable.  CBC CMP lipase unremarkable on repeated measurement.  CK normal  CT abdomen without any acute abnormality patient with recent EGD which was unremarkable.  Pain reproducible on superficial palpation and certain movements  Likely musculoskeletal.  Patient remains without any cardiorespiratory or GI symptoms.  Recommend to monitor symptoms, anticipate improvement  Advised weight, avoid carrying heavy weight  Symptomatic treatment as needed  Follow-up with PCP and GI as outpatient  Advise ultrasound of the right upper quadrant if GI symptoms with persistent pain    Munoz's esophagus with dysplasia  Assessment & Plan  Status post recent EGD  Currently not on medication  Pepcid    Paroxysmal atrial fibrillation (HCC)  Assessment & Plan  Very brief episode as per patient.  Subsequent extended monitoring without any evidence of recurrent A-fib  Currently off metoprolol and underwent repeat extended cardiac monitoring  Telemetry without any evidence of recurrent of A-fib, evidence of short PSVT noted  Follow-up with cardiology after discharge as scheduled next week to review recent outpatient  Cardiac monitoring    Dyslipidemia  Assessment & Plan  LDL elevated, patient prefers not to be on medication  Continue diet and lifestyle modification        Consultations During Hospital Stay:  None    Procedures Performed:     Telemetry monitoring    Significant Findings:     Refer to hospital course and above listed diagnosis related plan for details    Imaging while in hospital:    CT abdomen pelvis wo contrast    Result Date: 5/4/2024  Narrative: CT ABDOMEN AND PELVIS WITHOUT IV CONTRAST INDICATION: epigastric pain, diffuse pain. no contrast 2/2 fear of alzheimers and contrast refuses. COMPARISON: Multiple priors most  recently 12/9/2022 TECHNIQUE: CT examination of the abdomen and pelvis was performed without intravenous contrast. Multiplanar 2D reformatted images were created from the source data. This examination, like all CT scans performed in the [facility], was performed utilizing techniques to minimize radiation dose exposure, including the use of iterative reconstruction and automated exposure control. Radiation dose length product (DLP) for this visit: 434 mGy-cm Enteric Contrast: Not administered. FINDINGS: ABDOMEN LOWER CHEST: No clinically significant abnormality in the visualized lower chest. LIVER/BILIARY TREE: Simple hepatic cyst(s). No suspicious mass. Normal hepatic contours. No biliary dilation. GALLBLADDER: No calcified gallstones. No pericholecystic inflammatory change. SPLEEN: Unremarkable. PANCREAS: Unremarkable. ADRENAL GLANDS: Unremarkable. KIDNEYS/URETERS: Unremarkable. No hydronephrosis. STOMACH AND BOWEL: No evidence of bowel obstruction. Colonic diverticulosis. Duodenal diverticulum APPENDIX: No findings to suggest appendicitis. ABDOMINOPELVIC CAVITY: No ascites. No pneumoperitoneum. No lymphadenopathy. VESSELS: Unremarkable for patient's age. PELVIS REPRODUCTIVE ORGANS: Unremarkable for patient's age. URINARY BLADDER: Unremarkable. ABDOMINAL WALL/INGUINAL REGIONS: Unremarkable. BONES: No acute fracture or suspicious osseous lesion. Tarlov cysts.     Impression: No acute findings in the abdomen or pelvis within the limits of unenhanced technique. Workstation performed: [email or web address]       Incidental Findings:     None    Test Results Pending at Discharge (will require follow up):   As per After Visit Summary     Outpatient Tests Requested:  Stress test  Follow-up with PCP and cardiology regarding blood pressure monitoring    Complications:  Refer to hospital course and above listed diagnosis related plan, if any    Hospital Course:   As per HPI  Barbara Moreno is a 83 y.o. female  patient with history of Munoz's esophagus/GERD, paroxysmal A-fib, hypertension, meningioma/brain aneurysm who originally presented to the hospital on 5/4/2024 due to upper abdominal pain.  Patient reported that she started having mid and upper abdominal discomfort since day prior to admission which was worse with palpation, without any radiation.  Patient denied any chest pain, shortness of breath, fever, chills, nausea, vomiting, change in bowel habit, food intolerance.  She reported that her pain persisted and she got worried as she lives alone and she presented to ED for further evaluation.       In ED patient was noted to be hypertensive with blood pressure 224/103.  Patient was afebrile saturating adequately on room air.  CBC CMP lipase were unremarkable.  Patient underwent CT abdomen pelvis without contrast which did not reveal any acute abnormality.EKG reveals sinus bradycardia at 59 bpm, no acute ST-T wave changes noted.  Troponin noted to be 10-28-36.  Patient received aspirin, Pepcid, labetalol, MiraLAX.  Morphine and Zofran was offered but patient declined.  Patient was subsequently admitted due to elevated cardiac markers.     Patient recently had EGD and colonoscopy on 4/11-EGD unremarkable except some erythematous mucosa in the antrum.  Colonoscopy revealed diverticulosis of the descending and sigmoid colon.  3 subcentimeter and one 5-9 mm polyp were removed.     Patient had extended cardiac monitoring for evaluation for arrhythmia which revealed evidence of episodes of supraventricular tachycardia.  Echocardiogram 9/21 revealed EF 55 to 60%, grade 1 diastolic dysfunction.  No significant valvular abnormalities noted.     Patient did report that she started doing abdominal muscle exercises a day prior and she has been very active in her garden and even getting heavy stuff.  She reports that she was doing abdominal muscle exercises in the past but did not do it for long time prior to 2 days ago.   Patient reported that usually she is very active, does ride bike every day and does heavy work in the yard.  She reports that her friends always tell her to slow down because they cannot keep up with her even though they are much younger.  Patient denied any history of exertional chest pain, shortness of breath, palpitation, dizziness.  Patient was monitored in close position, blood pressure gradually improved.  Cardiac markers gradually trended down.  Telemetry did not reveal any acute abnormality.  Patient did not report any cardiorespiratory symptoms continue to have abdominal muscle soreness which was reproducible patient denied having any GI symptoms and tolerating diet well.  Patient remained clinically stable and was discharged home, patient was educated regarding diagnosis and follow-up plan.  Patient to follow-up with PCP and cardiology for blood pressure monitoring.  Outpatient stress test was advised due to risk factors.      Please see above list of diagnoses and related plan for additional information.       Condition at Discharge: stable     Discharge Day Visit / Exam:     Subjective: Feels well  Ambulating in the room  Still feels upper abdominal soreness with some movements  Denies any difficulty in ambulation  Tolerating diet well without any GI symptoms  Denies any chest pain shortness of breath dizziness or palpitations    Vitals: Blood Pressure: 124/70 (05/05/24 0742)  Pulse: 57 (05/05/24 0742)  Temperature: 98.1 °F (36.7 °C) (05/05/24 0742)  Temp Source: Temporal (05/05/24 0742)  Respirations: 16 (05/05/24 0742)  Height: 5' (152.4 cm) (05/04/24 1133)  Weight - Scale: 53.2 kg (117 lb 3.2 oz) (05/05/24 0542)  SpO2: 98 % (05/05/24 0742)  Exam:   Physical Exam  Constitutional:       General: She is not in acute distress.  HENT:      Head: Normocephalic and atraumatic.      Nose: Nose normal.   Eyes:      Conjunctiva/sclera: Conjunctivae normal.      Pupils: Pupils are equal, round, and reactive to  "light.   Cardiovascular:      Rate and Rhythm: Normal rate and regular rhythm.      Heart sounds: Normal heart sounds.   Pulmonary:      Effort: Pulmonary effort is normal. No respiratory distress.      Breath sounds: Normal breath sounds. No wheezing or rales.   Abdominal:      General: Bowel sounds are normal. There is no distension.      Palpations: Abdomen is soft.      Tenderness: There is abdominal tenderness (Mild superficial upper abdominal soreness over rectus muscles). There is no guarding or rebound.   Musculoskeletal:      Cervical back: Normal range of motion and neck supple.      Right lower leg: No edema.      Left lower leg: No edema.   Skin:     General: Skin is warm and dry.      Findings: No rash.   Neurological:      Mental Status: She is alert.      Cranial Nerves: No cranial nerve deficit.         Discharge instructions/Information to patient and family:(Discharge Medications and Follow up):   See after visit summary for information provided to patient and family.      Provisions for Follow-Up Care:  See after visit summary for information related to follow-up care and any pertinent home health orders.      Disposition: Home    Planned Readmission:  No     Discharge Statement:  I spent 35 minutes discharging the patient. This time was spent on the day of discharge. I had direct contact with the patient on the day of discharge. Greater than 50% of the total time was spent examining patient, answering all patient questions, arranging and discussing plan of care with patient as well as directly providing post-discharge instructions.  Additional time then spent on discharge activities.  Discussed with patient at length regarding discharge and follow-up recommendations.    Discharge Medications:  See after visit summary for reconciled discharge medications provided to patient and family.      ** Please Note: \"This note has been constructed using a voice recognition system.Therefore there may be " "syntax, spelling, and/or grammatical errors. Please call if you have any questions. \"**        "

## 2024-05-05 NOTE — ASSESSMENT & PLAN NOTE
Presented with mid and upper abdominal discomfort since 1 day worse with palpation and movement without any radiation.  Patient does report that she started doing abdominal muscle exercises a day prior, she used to do in the past but had not done for long time until a day prior  Afebrile vital signs stable.  CBC CMP lipase unremarkable on repeated measurement.  CK normal  CT abdomen without any acute abnormality patient with recent EGD which was unremarkable.  Pain reproducible on superficial palpation and certain movements  Likely musculoskeletal.  Patient remains without any cardiorespiratory or GI symptoms.  Recommend to monitor symptoms, anticipate improvement  Advised weight, avoid carrying heavy weight  Symptomatic treatment as needed  Follow-up with PCP and GI as outpatient  Advise ultrasound of the right upper quadrant if GI symptoms with persistent pain

## 2024-05-05 NOTE — PLAN OF CARE
Problem: PAIN - ADULT  Goal: Verbalizes/displays adequate comfort level or baseline comfort level  Description: Interventions:  - Encourage patient to monitor pain and request assistance  - Assess pain using appropriate pain scale  - Administer analgesics based on type and severity of pain and evaluate response  - Implement non-pharmacological measures as appropriate and evaluate response  - Consider cultural and social influences on pain and pain management  - Notify physician/advanced practitioner if interventions unsuccessful or patient reports new pain  Outcome: Progressing         Problem: DISCHARGE PLANNING  Goal: Discharge to home or other facility with appropriate resources  Description: INTERVENTIONS:  - Identify barriers to discharge w/patient and caregiver  - Arrange for needed discharge resources and transportation as appropriate  - Identify discharge learning needs (meds, wound care, etc.)  - Arrange for interpretive services to assist at discharge as needed  - Refer to Case Management Department for coordinating discharge planning if the patient needs post-hospital services based on physician/advanced practitioner order or complex needs related to functional status, cognitive ability, or social support system  Outcome: Progressing

## 2024-05-05 NOTE — ASSESSMENT & PLAN NOTE
Very brief episode as per patient.  Subsequent extended monitoring without any evidence of recurrent A-fib  Currently off metoprolol and underwent repeat extended cardiac monitoring  Telemetry without any evidence of recurrent of A-fib, evidence of short PSVT noted  Follow-up with cardiology after discharge as scheduled next week to review recent outpatient  Cardiac monitoring

## 2024-05-06 LAB
ATRIAL RATE: 59 BPM
P AXIS: 65 DEGREES
PR INTERVAL: 182 MS
QRS AXIS: 28 DEGREES
QRSD INTERVAL: 86 MS
QT INTERVAL: 404 MS
QTC INTERVAL: 399 MS
T WAVE AXIS: 59 DEGREES
VENTRICULAR RATE: 59 BPM

## 2024-05-06 PROCEDURE — 93010 ELECTROCARDIOGRAM REPORT: CPT | Performed by: INTERNAL MEDICINE

## 2024-05-13 ENCOUNTER — HOSPITAL ENCOUNTER (OUTPATIENT)
Dept: RADIOLOGY | Facility: HOSPITAL | Age: 84
Discharge: HOME | End: 2024-05-13
Attending: INTERNAL MEDICINE
Payer: MEDICARE

## 2024-05-13 ENCOUNTER — HOSPITAL ENCOUNTER (OUTPATIENT)
Dept: NON INVASIVE DIAGNOSTICS | Facility: HOSPITAL | Age: 84
Discharge: HOME | End: 2024-05-13
Attending: INTERNAL MEDICINE
Payer: MEDICARE

## 2024-05-13 VITALS
DIASTOLIC BLOOD PRESSURE: 90 MMHG | OXYGEN SATURATION: 96 % | RESPIRATION RATE: 20 BRPM | SYSTOLIC BLOOD PRESSURE: 160 MMHG | HEART RATE: 73 BPM

## 2024-05-13 DIAGNOSIS — R79.89 TROPONIN I ABOVE REFERENCE RANGE: ICD-10-CM

## 2024-05-13 LAB
CHEST PAIN STATEMENT: NORMAL
MAX DIASTOLIC BP: 84 MMHG
MAX HR PERCENT: 93 %
MAX HR: 130 BPM
MAX PREDICTED HEART RATE: 139 BPM
NUC STRESS EJECTION FRACTION: 75 %
PROTOCOL NAME: NORMAL
RATE PRESSURE PRODUCT: NORMAL
REASON FOR TERMINATION: NORMAL
SL CV REST NUCLEAR ISOTOPE DOSE: 11 MCI
SL CV STRESS NUCLEAR ISOTOPE DOSE: 32.4 MCI
SL CV STRESS RECOVERY BP: NORMAL MMHG
SL CV STRESS RECOVERY HR: 73 BPM
SL CV STRESS RECOVERY O2 SAT: 99 %
SL CV STRESS STAGE REACHED: 2
STRESS ANGINA INDEX: 0
STRESS BASELINE BP: NORMAL MMHG
STRESS BASELINE HR: 73 BPM
STRESS O2 SAT REST: 96 %
STRESS PEAK HR: 92 BPM
STRESS POST ESTIMATED WORKLOAD: 7 METS
STRESS POST EXERCISE DUR MIN: 7 MIN
STRESS POST EXERCISE DUR MIN: 7 MIN
STRESS POST EXERCISE DUR SEC: 31 SEC
STRESS POST EXERCISE DUR SEC: 31 SEC
STRESS POST O2 SAT PEAK: 97 %
STRESS POST PEAK BP: 180 MMHG
STRESS POST PEAK HR: 130 BPM
STRESS POST PEAK SYSTOLIC BP: 180 MMHG
TARGET HR FORMULA: NORMAL
TEST INDICATION: NORMAL

## 2024-05-13 PROCEDURE — 78452 HT MUSCLE IMAGE SPECT MULT: CPT | Performed by: INTERNAL MEDICINE

## 2024-05-13 PROCEDURE — 93018 CV STRESS TEST I&R ONLY: CPT | Performed by: INTERNAL MEDICINE

## 2024-05-13 PROCEDURE — 78452 HT MUSCLE IMAGE SPECT MULT: CPT

## 2024-05-13 PROCEDURE — 93016 CV STRESS TEST SUPVJ ONLY: CPT | Performed by: INTERNAL MEDICINE

## 2024-05-13 PROCEDURE — 93017 CV STRESS TEST TRACING ONLY: CPT

## 2024-05-13 PROCEDURE — A9502 TC99M TETROFOSMIN: HCPCS

## 2024-09-18 ENCOUNTER — APPOINTMENT (OUTPATIENT)
Dept: LAB | Facility: CLINIC | Age: 84
End: 2024-09-18
Payer: MEDICARE

## 2024-09-18 ENCOUNTER — TRANSCRIBE ORDERS (OUTPATIENT)
Dept: LAB | Facility: CLINIC | Age: 84
End: 2024-09-18

## 2024-09-18 DIAGNOSIS — K22.719 BARRETT'S ESOPHAGUS WITH DYSPLASIA: ICD-10-CM

## 2024-09-18 DIAGNOSIS — E78.5 DYSLIPIDEMIA: ICD-10-CM

## 2024-09-18 DIAGNOSIS — D32.9 MENINGIOMA (HCC): ICD-10-CM

## 2024-09-18 DIAGNOSIS — R73.03 PREDIABETES: ICD-10-CM

## 2024-09-18 DIAGNOSIS — I10 ESSENTIAL HYPERTENSION, BENIGN: Primary | ICD-10-CM

## 2024-09-18 LAB
ALBUMIN SERPL BCG-MCNC: 4.2 G/DL (ref 3.5–5)
ALP SERPL-CCNC: 59 U/L (ref 34–104)
ALT SERPL W P-5'-P-CCNC: 16 U/L (ref 7–52)
ANION GAP SERPL CALCULATED.3IONS-SCNC: 5 MMOL/L (ref 4–13)
AST SERPL W P-5'-P-CCNC: 18 U/L (ref 13–39)
BILIRUB SERPL-MCNC: 0.68 MG/DL (ref 0.2–1)
BUN SERPL-MCNC: 17 MG/DL (ref 5–25)
CALCIUM SERPL-MCNC: 9.6 MG/DL (ref 8.4–10.2)
CHLORIDE SERPL-SCNC: 103 MMOL/L (ref 96–108)
CHOLEST SERPL-MCNC: 209 MG/DL
CO2 SERPL-SCNC: 33 MMOL/L (ref 21–32)
CREAT SERPL-MCNC: 0.79 MG/DL (ref 0.6–1.3)
GFR SERPL CREATININE-BSD FRML MDRD: 70 ML/MIN/1.73SQ M
GLUCOSE P FAST SERPL-MCNC: 88 MG/DL (ref 65–99)
HDLC SERPL-MCNC: 61 MG/DL
LDLC SERPL CALC-MCNC: 132 MG/DL (ref 0–100)
NONHDLC SERPL-MCNC: 148 MG/DL
POTASSIUM SERPL-SCNC: 4.8 MMOL/L (ref 3.5–5.3)
PROT SERPL-MCNC: 6.6 G/DL (ref 6.4–8.4)
SODIUM SERPL-SCNC: 141 MMOL/L (ref 135–147)
TRIGL SERPL-MCNC: 82 MG/DL
TSH SERPL DL<=0.05 MIU/L-ACNC: 1.76 UIU/ML (ref 0.45–4.5)

## 2024-09-18 PROCEDURE — 83036 HEMOGLOBIN GLYCOSYLATED A1C: CPT

## 2024-09-18 PROCEDURE — 80061 LIPID PANEL: CPT

## 2024-09-18 PROCEDURE — 80053 COMPREHEN METABOLIC PANEL: CPT

## 2024-09-18 PROCEDURE — 36415 COLL VENOUS BLD VENIPUNCTURE: CPT

## 2024-09-18 PROCEDURE — 84443 ASSAY THYROID STIM HORMONE: CPT

## 2024-09-19 LAB
EST. AVERAGE GLUCOSE BLD GHB EST-MCNC: 123 MG/DL
HBA1C MFR BLD: 5.9 %

## 2025-01-29 ENCOUNTER — HOSPITAL ENCOUNTER (OUTPATIENT)
Dept: RADIOLOGY | Facility: HOSPITAL | Age: 85
Discharge: HOME | End: 2025-01-29
Payer: MEDICARE

## 2025-01-29 DIAGNOSIS — M54.6 PAIN IN THORACIC SPINE: ICD-10-CM

## 2025-01-29 PROCEDURE — 72110 X-RAY EXAM L-2 SPINE 4/>VWS: CPT

## 2025-01-29 PROCEDURE — 72070 X-RAY EXAM THORAC SPINE 2VWS: CPT

## 2025-02-11 ENCOUNTER — TELEPHONE (OUTPATIENT)
Age: 85
End: 2025-02-11

## 2025-02-11 NOTE — TELEPHONE ENCOUNTER
"Pt calling in to question this, She says when she had her colon/EGD last year the Dr told her it would be 3 before he wanted to see her for possible repeat on these. I checked the report and the Lab exam and it dose say in the tissue results:     \"Would consider repeat colonoscopy and EGD in 3 years if she is otherwise healthy at that time\"     Pt says she is feeling fine right now so would like to confirm that she dose not need to come in yet. Please advise?   "

## 2025-02-11 NOTE — TELEPHONE ENCOUNTER
Called and spoke with patient. She is correct. She isn't due until 2027 for both her colon and EGD with Dr. Hawley. The old recall wasn't removed when the new one was placed in 2024.

## 2025-02-11 NOTE — TELEPHONE ENCOUNTER
Patient is due in March for an EGD for hx of Munoz's. Due to her age, she needs an office visit first. Left message for pt to call and schedule office visit.

## 2025-08-07 ENCOUNTER — HOSPITAL ENCOUNTER (OUTPATIENT)
Dept: RADIOLOGY | Facility: HOSPITAL | Age: 85
Discharge: HOME | End: 2025-08-07
Payer: MEDICARE

## 2025-08-07 DIAGNOSIS — R10.11 RIGHT UPPER QUADRANT PAIN: ICD-10-CM

## 2025-08-07 PROCEDURE — 76705 ECHO EXAM OF ABDOMEN: CPT

## (undated) DEVICE — "MH-443 SUCTION VALVE F/EVIS140 EVIS160": Brand: SUCTION VALVE

## (undated) DEVICE — TRAVELKIT CONTAINS FIRST STEP KIT (200ML EP-4 KIT) AND SOILED SCOPE BAG - 1 KIT: Brand: TRAVELKIT CONTAINS FIRST STEP KIT AND SOILED SCOPE BAG

## (undated) DEVICE — SINGLE-USE BIOPSY FORCEPS: Brand: RADIAL JAW 4

## (undated) DEVICE — DISPOSABLE BIOPSY VALVE MAJ-1555: Brand: SINGLE USE BIOPSY VALVE (STERILE)

## (undated) DEVICE — 60 ML SYRINGE,REGULAR TIP: Brand: MONOJECT

## (undated) DEVICE — 1200CC GUARDIAN II: Brand: GUARDIAN

## (undated) DEVICE — AIRLIFE™  ADULT CUSHION NASAL CANNULA WITH 7 FOOT (2.1 M) CRUSH-RESISTANT OXYGEN TUBING, AND U/CONNECT-IT ADAPTER: Brand: AIRLIFE™

## (undated) DEVICE — MEDI-VAC YANKAUER SUCTION HANDLE: Brand: CARDINAL HEALTH

## (undated) DEVICE — "MAJ-901 WATER CONTAINER SET CV-160/140": Brand: WATER CONTAINER

## (undated) DEVICE — LUBRICANT SURGILUBE TUBE 4 OZ  FLIP TOP

## (undated) DEVICE — "MH-438 A/W VLVE F/140 EVIS-140": Brand: AIR/WATER VALVE

## (undated) DEVICE — GLOVE EXAM NON-STRL NTRL PLUS LRG PF

## (undated) DEVICE — "MB-142 MOUTHPIECE": Brand: MOUTHPIECE

## (undated) DEVICE — BRUSH ENDO CLEANING DBL-HEADER

## (undated) DEVICE — GAUZE SPONGES,16 PLY: Brand: CURITY

## (undated) DEVICE — BITE BLOCK ENDO 60FR ADLT MAXI  DISP W/STRAP

## (undated) DEVICE — TUBING BUBBLE CLEAR 5MM X 100 FT NS

## (undated) DEVICE — SOLIDIFIER FLUID WASTE CONTROL 1500ML